# Patient Record
Sex: MALE | Race: WHITE | NOT HISPANIC OR LATINO | Employment: OTHER | URBAN - METROPOLITAN AREA
[De-identification: names, ages, dates, MRNs, and addresses within clinical notes are randomized per-mention and may not be internally consistent; named-entity substitution may affect disease eponyms.]

---

## 2017-12-04 ENCOUNTER — OFFICE VISIT (OUTPATIENT)
Dept: URGENT CARE | Facility: CLINIC | Age: 72
End: 2017-12-04
Payer: MEDICARE

## 2017-12-04 VITALS
BODY MASS INDEX: 30.48 KG/M2 | SYSTOLIC BLOOD PRESSURE: 137 MMHG | DIASTOLIC BLOOD PRESSURE: 91 MMHG | OXYGEN SATURATION: 97 % | TEMPERATURE: 98 F | WEIGHT: 225 LBS | HEART RATE: 68 BPM | HEIGHT: 72 IN | RESPIRATION RATE: 16 BRPM

## 2017-12-04 DIAGNOSIS — J01.90 ACUTE NON-RECURRENT SINUSITIS, UNSPECIFIED LOCATION: Primary | ICD-10-CM

## 2017-12-04 PROCEDURE — 96372 THER/PROPH/DIAG INJ SC/IM: CPT | Mod: S$GLB,,, | Performed by: EMERGENCY MEDICINE

## 2017-12-04 PROCEDURE — 99203 OFFICE O/P NEW LOW 30 MIN: CPT | Mod: 25,S$GLB,, | Performed by: NURSE PRACTITIONER

## 2017-12-04 RX ORDER — AMOXICILLIN 500 MG
2 CAPSULE ORAL DAILY
COMMUNITY
End: 2023-04-19 | Stop reason: CLARIF

## 2017-12-04 RX ORDER — BETAMETHASONE SODIUM PHOSPHATE AND BETAMETHASONE ACETATE 3; 3 MG/ML; MG/ML
9 INJECTION, SUSPENSION INTRA-ARTICULAR; INTRALESIONAL; INTRAMUSCULAR; SOFT TISSUE
Status: COMPLETED | OUTPATIENT
Start: 2017-12-04 | End: 2017-12-04

## 2017-12-04 RX ORDER — FINASTERIDE 1 MG/1
1 TABLET, FILM COATED ORAL DAILY
COMMUNITY
End: 2020-04-30

## 2017-12-04 RX ORDER — AMOXICILLIN AND CLAVULANATE POTASSIUM 875; 125 MG/1; MG/1
1 TABLET, FILM COATED ORAL 2 TIMES DAILY
Qty: 20 TABLET | Refills: 0 | Status: SHIPPED | OUTPATIENT
Start: 2017-12-04 | End: 2017-12-14

## 2017-12-04 RX ORDER — ASPIRIN 81 MG/1
81 TABLET ORAL DAILY
COMMUNITY
End: 2023-04-19 | Stop reason: CLARIF

## 2017-12-04 RX ORDER — FINASTERIDE 5 MG/1
5 TABLET, FILM COATED ORAL DAILY
COMMUNITY
End: 2017-12-04 | Stop reason: CLARIF

## 2017-12-04 RX ORDER — ATORVASTATIN CALCIUM 10 MG/1
10 TABLET, FILM COATED ORAL DAILY
COMMUNITY
End: 2020-04-29 | Stop reason: CLARIF

## 2017-12-04 RX ADMIN — BETAMETHASONE SODIUM PHOSPHATE AND BETAMETHASONE ACETATE 9 MG: 3; 3 INJECTION, SUSPENSION INTRA-ARTICULAR; INTRALESIONAL; INTRAMUSCULAR; SOFT TISSUE at 09:12

## 2017-12-04 NOTE — PROGRESS NOTES
Subjective:       Patient ID: Mao Pearce is a 72 y.o. male.    Vitals:    12/04/17 0915   BP: (!) 137/91   Pulse: 68   Resp: 16   Temp: 97.6 °F (36.4 °C)       Chief Complaint: Sinus Problem    Patient states he has had sinus congestion for 4 days with no improvement from over the counter cold medications.    Pt states his BP is normally 120/80, but he is nervous about being here today, so his BP is elevated.      Sinus Problem   This is a new problem. Episode onset: 4 days. The problem has been gradually worsening since onset. There has been no fever. His pain is at a severity of 6/10. The pain is moderate. Associated symptoms include congestion (nasal), coughing, diaphoresis, headaches, a hoarse voice and sinus pressure. Pertinent negatives include no chills, ear pain, shortness of breath or sore throat. Treatments tried: charleen selzer plus. The treatment provided no relief.     Review of Systems   Constitution: Positive for diaphoresis. Negative for chills, fever and malaise/fatigue.   HENT: Positive for congestion (nasal), hoarse voice and sinus pressure. Negative for ear pain and sore throat.    Eyes: Negative for discharge and redness.   Cardiovascular: Negative for chest pain, dyspnea on exertion and leg swelling.   Respiratory: Positive for cough. Negative for shortness of breath, sputum production and wheezing.    Musculoskeletal: Negative for myalgias.   Gastrointestinal: Negative for abdominal pain and nausea.   Neurological: Positive for headaches.       Objective:      Physical Exam   Constitutional: He is oriented to person, place, and time. He appears well-developed and well-nourished. He is cooperative.  Non-toxic appearance. He does not have a sickly appearance. He does not appear ill. No distress.   HENT:   Head: Normocephalic and atraumatic.   Right Ear: Hearing, tympanic membrane, external ear and ear canal normal.   Left Ear: Hearing, tympanic membrane, external ear and ear canal normal.    Nose: Mucosal edema and rhinorrhea present. Right sinus exhibits maxillary sinus tenderness and frontal sinus tenderness. Left sinus exhibits maxillary sinus tenderness and frontal sinus tenderness.   Mouth/Throat: Uvula is midline and mucous membranes are normal. Posterior oropharyngeal erythema present. No oropharyngeal exudate.   Eyes: Conjunctivae and lids are normal.   Neck: Normal range of motion and full passive range of motion without pain. Neck supple. No neck rigidity. No edema, no erythema and normal range of motion present.   Cardiovascular: Normal rate, regular rhythm and normal heart sounds.    Pulmonary/Chest: Effort normal and breath sounds normal. No accessory muscle usage. No apnea, no tachypnea and no bradypnea. No respiratory distress. He has no decreased breath sounds. He has no wheezes. He has no rhonchi. He has no rales.   Positive for cough   Abdominal: Normal appearance.   Lymphadenopathy:        Head (right side): No submental, no submandibular, no tonsillar, no preauricular, no posterior auricular and no occipital adenopathy present.        Head (left side): No submental, no submandibular, no tonsillar, no preauricular, no posterior auricular and no occipital adenopathy present.     He has no cervical adenopathy.   Neurological: He is alert and oriented to person, place, and time.   Psychiatric: He has a normal mood and affect. His speech is normal and behavior is normal.   Nursing note and vitals reviewed.      Assessment:       1. Acute non-recurrent sinusitis, unspecified location        Plan:       Mao was seen today for sinus problem.    Diagnoses and all orders for this visit:    Acute non-recurrent sinusitis, unspecified location  -     betamethasone acetate-betamethasone sodium phosphate injection 9 mg; Inject 1.5 mLs (9 mg total) into the muscle one time.  -     amoxicillin-clavulanate 875-125mg (AUGMENTIN) 875-125 mg per tablet; Take 1 tablet by mouth 2 (two) times  daily.    Discussed over the counter Mucinex and fluids.  Discussed wait and see antibiotic with pt and to wait 5-7 days to take.  Pt verbalizes understanding.

## 2017-12-04 NOTE — PATIENT INSTRUCTIONS
Please drink plenty of fluids.  Please get plenty of rest.    Please return here or go to the Emergency Department for any concerns or worsening of condition.    If you were prescribed antibiotics, please take them to completion.    If you were given wait & see antibiotics, please wait 5-7 days before taking them, and only take them if your symptoms have worsened or not improved.  If you do begin taking the antibiotics, please take them to completion.    Please take over the counter plain mucinex as directed on bottle. Take with at least 2 glasses of water.    If you do have Hypertension or palpitations, it is safe to take Coricidin HBP for relief of sinus symptoms.    If not allergic, please take over the counter Tylenol (Acetaminophen) and/or Motrin (Ibuprofen) as directed on bottle for control of pain and/or fever.    Please follow up with your primary care doctor or specialist as needed.    If you  smoke, please stop smoking.    Acute Bacterial Rhinosinusitis (ABRS)    Acute bacterial rhinosinusitis (ABRS) is an infection of your nasal cavity and sinuses. Its caused by bacteria. Acute means that youve had symptoms for less than 12 weeks.  Understanding your sinuses  The nasal cavity is the large air-filled space behind your nose. The sinuses are a group of spaces formed by the bones of your face. They connect with your nasal cavity. ABRS causes the tissue lining these spaces to become inflamed. Mucus may not drain normally. This leads to facial pain and other symptoms.  What causes ABRS?  ABRS most often follows an upper respiratory infection caused by a virus. Bacteria then infect the lining of your nasal cavity and sinuses. But you can also get ABRS if you have:  · Nasal allergies  · Long-term nasal swelling and congestion not caused by allergies  · Blockage in the nose  Symptoms of ABRS  The symptoms of ABRS may be different for each person, and can include:  · Nasal congestion  · Runny nose  · Fluid  draining from the nose down the throat (postnasal drip)  · Headache  · Cough  · Pain in the sinuses  · Thick, colored fluid from the nose (mucus)  · Fever  Diagnosing ABRS  ABRS may be diagnosed if youve had an upper respiratory infection like a cold and cough for longer than 10 to 14 days. Your health care provider will ask about your symptoms and your medical history. The provider will check your vital signs, including your temperature. Youll have a physical exam. The health care provider will check your ears, nose, and throat. You likely wont need any tests. If ABRS comes back, you may have a culture or other tests.  Treatment for ABRS  Treatment may include:  · Antibiotic medicine. This is for symptoms that last for at least 10 to 14 days.  · Nasal corticosteroid medicine. Drops or spray used in the nose can lessen swelling and congestion.  · Over-the-counter pain medicine. This is to lessen sinus pain and pressure.  · Nasal decongestant medicine. Spray or drops may help to lessen congestion. Do not use them for more than a few days.  · Salt wash (saline irrigation). This can help to loosen mucus.  Possible complications of ABRS  ABRS may come back or become long-term (chronic).  In rare cases, ABRS may cause complications such as:   · Inflamed tissue around the brain and spinal cord (meningitis)  · Inflamed tissue around the eyes (orbital cellulitis)  · Inflamed bones around the sinuses (osteitis)  These problems may need to be treated in a hospital with intravenous (IV) antibiotic medicine or surgery.  When to call the health care provider  Call your health care provider if you have any of the following:  · Symptoms that dont get better, or get worse  · Symptoms that dont get better after 3 to 5 days on antibiotics  · Trouble seeing  · Swelling around your eyes  · Confusion or trouble staying awake   Date Last Reviewed: 3/3/2015  © 8800-0087 [employer]. [address] [place] [address], [place]  [address]. All rights reserved. This information is not intended as a substitute for professional medical care. Always follow your healthcare professional's instructions.

## 2018-03-05 PROBLEM — J01.90 ACUTE NON-RECURRENT SINUSITIS: Status: RESOLVED | Noted: 2017-12-04 | Resolved: 2018-03-05

## 2019-03-19 ENCOUNTER — OFFICE VISIT (OUTPATIENT)
Dept: URGENT CARE | Facility: CLINIC | Age: 74
End: 2019-03-19
Payer: MEDICARE

## 2019-03-19 VITALS
RESPIRATION RATE: 18 BRPM | DIASTOLIC BLOOD PRESSURE: 81 MMHG | HEART RATE: 69 BPM | HEIGHT: 72 IN | SYSTOLIC BLOOD PRESSURE: 121 MMHG | OXYGEN SATURATION: 98 % | WEIGHT: 225 LBS | BODY MASS INDEX: 30.48 KG/M2 | TEMPERATURE: 97 F

## 2019-03-19 DIAGNOSIS — H61.23 BILATERAL IMPACTED CERUMEN: Primary | ICD-10-CM

## 2019-03-19 PROCEDURE — 99214 PR OFFICE/OUTPT VISIT, EST, LEVL IV, 30-39 MIN: ICD-10-PCS | Mod: 25,S$GLB,, | Performed by: NURSE PRACTITIONER

## 2019-03-19 PROCEDURE — 69209 REMOVE IMPACTED EAR WAX UNI: CPT | Mod: 50,S$GLB,, | Performed by: NURSE PRACTITIONER

## 2019-03-19 PROCEDURE — 69209 EAR CERUMEN REMOVAL: ICD-10-PCS | Mod: 50,S$GLB,, | Performed by: NURSE PRACTITIONER

## 2019-03-19 PROCEDURE — 99214 OFFICE O/P EST MOD 30 MIN: CPT | Mod: 25,S$GLB,, | Performed by: NURSE PRACTITIONER

## 2019-03-20 NOTE — PROGRESS NOTES
"Subjective:       Patient ID: Mao Pearce is a 73 y.o. male.    Vitals:  height is 6' (1.829 m) and weight is 102.1 kg (225 lb). His temperature is 97 °F (36.1 °C). His blood pressure is 121/81 and his pulse is 69. His respiration is 18 and oxygen saturation is 98%.     Chief Complaint: Cerumen Impaction    Patient c/o wax build up in ears, unable to hear.  Patient states he used a qtip and thinks that he pushed it in further.  No pain.  States repeatedly "I'm fine, I just need y'all to look and clean my ears."  Admits to drinking some wine tonight.        Ear Fullness    There is pain in both ears. This is a new problem. The current episode started more than 1 year ago. The problem occurs constantly. The problem has been gradually worsening. There has been no fever. The patient is experiencing no pain. Associated symptoms include hearing loss.       Constitution: Negative for chills, sweating, fatigue and fever.   HENT: Positive for hearing loss. Negative for ear pain, congestion, sinus pain, sinus pressure and voice change.    Neck: Negative for painful lymph nodes.   Eyes: Negative for eye redness.   Respiratory: Negative for chest tightness, sputum production, bloody sputum, COPD, shortness of breath, stridor, wheezing and asthma.    Gastrointestinal: Negative for nausea.   Musculoskeletal: Negative for muscle ache.   Allergic/Immunologic: Negative for seasonal allergies and asthma.   Hematologic/Lymphatic: Negative for swollen lymph nodes.       Objective:      Physical Exam   Constitutional: He is oriented to person, place, and time. He appears well-developed and well-nourished. He is cooperative.  Non-toxic appearance. He does not appear ill. No distress.   HENT:   Head: Normocephalic and atraumatic.   Right Ear: Hearing and external ear normal.   Left Ear: Hearing and external ear normal.   Nose: Nose normal. No mucosal edema, rhinorrhea or nasal deformity. No epistaxis. Right sinus exhibits no " maxillary sinus tenderness and no frontal sinus tenderness. Left sinus exhibits no maxillary sinus tenderness and no frontal sinus tenderness.   Mouth/Throat: Uvula is midline, oropharynx is clear and moist and mucous membranes are normal. No trismus in the jaw. Normal dentition. No uvula swelling. No posterior oropharyngeal erythema.   Bilateral cerumen impaction   Eyes: Conjunctivae and lids are normal. No scleral icterus.   Sclera clear bilat   Neck: Trachea normal, full passive range of motion without pain and phonation normal. Neck supple.   Cardiovascular: Normal rate, regular rhythm, normal heart sounds, intact distal pulses and normal pulses.   Pulmonary/Chest: Effort normal and breath sounds normal. No respiratory distress.   Abdominal: Soft. Normal appearance and bowel sounds are normal. He exhibits no distension. There is no tenderness.   Musculoskeletal: Normal range of motion. He exhibits no edema or deformity.   Neurological: He is alert and oriented to person, place, and time. He exhibits normal muscle tone. Coordination normal.   Skin: Skin is warm, dry and intact. He is not diaphoretic. No pallor.   Psychiatric: He has a normal mood and affect. His speech is normal and behavior is normal. Judgment and thought content normal. Cognition and memory are normal.   Nursing note and vitals reviewed.      Assessment:       1. Bilateral impacted cerumen        Plan:         Bilateral impacted cerumen  -     Ear Cerumen Removal      Patient Instructions     Earwax (Treated)    Everyone produces earwax from the lining of the ear canal. It lubricates and protects the ear. The wax that forms in the canal slowly moves toward the outside of the ear and falls out. Sometimes wax can build up in the ear canal. This can cause a blockage and loss of hearing. A buildup of earwax was removed from your ear today.  Home care  If you have a tendency to build up wax in the ear canal, you should clear the wax at home  regularly, before it causes discomfort. This should be about once every six months.  · Unless a medicine was prescribed, you may use an over-the-counter product made for clearing earwax. These contain carbamide peroxide and are available over-the-counter in a kit with a small bulb syringe.  · Lie down with the blocked ear facing upward. Apply one dropper full of medicine and wait a few minutes. Grasp the outer ear and wiggle it to help the solution enter the canal.  · Lean over a sink or basin with the blocked ear turned downward. Use a rubber bulb syringe filled with warm (not hot or cold) water to rinse the ear several times. Use gentle pressure only. You may need to repeat the irrigation several times before the wax flows out.  · If you are having trouble draining all the water out of your ear canal, put a few drops of rubbing alcohol into the ear canal. This will help remove the remaining water.  Don'ts  · Dont use cold water to rinse the ear. This will make you dizzy.  · Dont do this procedure if you have an ear infection. Symptoms include ear pain, fever, or fluid draining from the ear.  · Dont do this procedure if you have a punctured eardrum.  · Dont use cotton swabs, matches, hairpins, keys, or other objects to clean the ear canal. This can cause infection of the ear canal or rupture of the eardrum. Because of their size and shape, cotton swabs can push the earwax deeper into the ear canal instead of removing it.  Follow-up care  Follow up with your healthcare provider, or as advised.  When to seek medical advice  Call your healthcare provider right away if any of these occur:  · Worsening ear pain  · Fever of 100.4°F (38°C) or higher, or as directed by your healthcare provider  · Hearing does not return to normal after three days of treatment  · Fluid drainage or bleeding from the ear canal  · Swelling, redness, or tenderness of the outer ear  · Headache, neck pain, or stiff neck  Date Last Reviewed:  3/22/2015  © 2964-8136 [employer], [employer] [address] [place] [address], [place] [address]. All rights reserved. This information is not intended as a substitute for professional medical care. Always follow your healthcare professional's instructions.

## 2019-03-20 NOTE — PATIENT INSTRUCTIONS
Earwax (Treated)    Everyone produces earwax from the lining of the ear canal. It lubricates and protects the ear. The wax that forms in the canal slowly moves toward the outside of the ear and falls out. Sometimes wax can build up in the ear canal. This can cause a blockage and loss of hearing. A buildup of earwax was removed from your ear today.  Home care  If you have a tendency to build up wax in the ear canal, you should clear the wax at home regularly, before it causes discomfort. This should be about once every six months.  · Unless a medicine was prescribed, you may use an over-the-counter product made for clearing earwax. These contain carbamide peroxide and are available over-the-counter in a kit with a small bulb syringe.  · Lie down with the blocked ear facing upward. Apply one dropper full of medicine and wait a few minutes. Grasp the outer ear and wiggle it to help the solution enter the canal.  · Lean over a sink or basin with the blocked ear turned downward. Use a rubber bulb syringe filled with warm (not hot or cold) water to rinse the ear several times. Use gentle pressure only. You may need to repeat the irrigation several times before the wax flows out.  · If you are having trouble draining all the water out of your ear canal, put a few drops of rubbing alcohol into the ear canal. This will help remove the remaining water.  Don'ts  · Dont use cold water to rinse the ear. This will make you dizzy.  · Dont do this procedure if you have an ear infection. Symptoms include ear pain, fever, or fluid draining from the ear.  · Dont do this procedure if you have a punctured eardrum.  · Dont use cotton swabs, matches, hairpins, keys, or other objects to clean the ear canal. This can cause infection of the ear canal or rupture of the eardrum. Because of their size and shape, cotton swabs can push the earwax deeper into the ear canal instead of removing it.  Follow-up care  Follow up with your  healthcare provider, or as advised.  When to seek medical advice  Call your healthcare provider right away if any of these occur:  · Worsening ear pain  · Fever of 100.4°F (38°C) or higher, or as directed by your healthcare provider  · Hearing does not return to normal after three days of treatment  · Fluid drainage or bleeding from the ear canal  · Swelling, redness, or tenderness of the outer ear  · Headache, neck pain, or stiff neck  Date Last Reviewed: 3/22/2015  © 4555-1116 [employer]. [address] [place] [address]. All rights reserved. This information is not intended as a substitute for professional medical care. Always follow your healthcare professional's instructions.

## 2019-03-20 NOTE — PROCEDURES
Ear Cerumen Removal  Date/Time: 3/19/2019 8:08 PM  Performed by: Ml Martinez MA  Authorized by: Anaya Mendez NP     Consent Done?:  Yes (Verbal)    Local anesthetic:  None  Location details:  Both ears  Procedure type: irrigation    Cerumen  Removal Results:  Cerumen completely removed  Patient tolerance:  Patient tolerated the procedure well with no immediate complications     Hearing restored.  Bilateral TM and canals normal.

## 2019-06-19 ENCOUNTER — OFFICE VISIT (OUTPATIENT)
Dept: URGENT CARE | Facility: CLINIC | Age: 74
End: 2019-06-19
Payer: MEDICARE

## 2019-06-19 VITALS
WEIGHT: 225 LBS | DIASTOLIC BLOOD PRESSURE: 82 MMHG | OXYGEN SATURATION: 99 % | TEMPERATURE: 97 F | BODY MASS INDEX: 30.48 KG/M2 | RESPIRATION RATE: 16 BRPM | HEART RATE: 64 BPM | SYSTOLIC BLOOD PRESSURE: 133 MMHG | HEIGHT: 72 IN

## 2019-06-19 DIAGNOSIS — H61.23 EXCESSIVE CERUMEN IN BOTH EAR CANALS: Primary | ICD-10-CM

## 2019-06-19 PROCEDURE — 69209 EAR CERUMEN REMOVAL: ICD-10-PCS | Mod: 50,S$GLB,, | Performed by: NURSE PRACTITIONER

## 2019-06-19 PROCEDURE — 99214 OFFICE O/P EST MOD 30 MIN: CPT | Mod: S$GLB,,, | Performed by: NURSE PRACTITIONER

## 2019-06-19 PROCEDURE — 99214 PR OFFICE/OUTPT VISIT, EST, LEVL IV, 30-39 MIN: ICD-10-PCS | Mod: S$GLB,,, | Performed by: NURSE PRACTITIONER

## 2019-06-19 PROCEDURE — 69209 REMOVE IMPACTED EAR WAX UNI: CPT | Mod: 50,S$GLB,, | Performed by: NURSE PRACTITIONER

## 2019-06-19 RX ORDER — ALPRAZOLAM 1 MG/1
TABLET ORAL
Refills: 1 | COMMUNITY
Start: 2019-04-16

## 2019-06-19 RX ORDER — ATORVASTATIN CALCIUM 40 MG/1
TABLET, FILM COATED ORAL
Refills: 3 | COMMUNITY
Start: 2019-06-10 | End: 2020-04-29 | Stop reason: CLARIF

## 2019-06-19 RX ORDER — FINASTERIDE 5 MG/1
TABLET, FILM COATED ORAL
Refills: 3 | COMMUNITY
Start: 2019-03-20

## 2019-06-19 NOTE — PROCEDURES
Ear Cerumen Removal  Date/Time: 6/19/2019 5:33 PM  Performed by: Marcy Lion MA  Authorized by: Anaya Mendez NP     Consent Done?:  Yes (Verbal)    Local anesthetic:  None  Medication Used:  Debrox  Location details:  Both ears  Procedure type: irrigation    Cerumen  Removal Results:  Cerumen completely removed  Patient tolerance:  Patient tolerated the procedure well with no immediate complications     Hearing returned.  Tolerated well.

## 2019-06-19 NOTE — PROGRESS NOTES
Subjective:       Patient ID: Mao Pearce is a 73 y.o. male.    Vitals:    06/19/19 1654   BP: 133/82   Pulse: 64   Resp: 16   Temp: 97.1 °F (36.2 °C)   TempSrc: Oral   SpO2: 99%   Weight: 102.1 kg (225 lb)   Height: 6' (1.829 m)       Chief Complaint: Cerumen Impaction    Pt states today onset with possible impacted wax again.  Decreased hearing today during lunch.  Denies URI symptoms.    Other   This is a new problem. The current episode started today. The problem has been unchanged. Pertinent negatives include no abdominal pain, chest pain, chills, congestion, coughing, fever, headaches, nausea, rash, sore throat or vomiting. Nothing aggravates the symptoms. He has tried nothing for the symptoms.     Review of Systems   Constitution: Negative for chills and fever.   HENT: Negative for congestion and sore throat.    Eyes: Negative for blurred vision.   Cardiovascular: Negative for chest pain.   Respiratory: Negative for cough and shortness of breath.    Skin: Negative for rash.   Musculoskeletal: Negative for back pain and joint pain.   Gastrointestinal: Negative for abdominal pain, diarrhea, nausea and vomiting.   Neurological: Negative for headaches.   Psychiatric/Behavioral: The patient is not nervous/anxious.        Objective:      Physical Exam   Constitutional: He is oriented to person, place, and time. He appears well-developed and well-nourished. He is cooperative.  Non-toxic appearance. He does not have a sickly appearance. He does not appear ill. No distress.   HENT:   Head: Normocephalic and atraumatic.   Right Ear: Hearing, tympanic membrane, external ear and ear canal normal.   Left Ear: Hearing, tympanic membrane, external ear and ear canal normal.   Nose: Nose normal. No mucosal edema, rhinorrhea or nasal deformity. No epistaxis. Right sinus exhibits no maxillary sinus tenderness and no frontal sinus tenderness. Left sinus exhibits no maxillary sinus tenderness and no frontal sinus  tenderness.   Mouth/Throat: Uvula is midline, oropharynx is clear and moist and mucous membranes are normal. No trismus in the jaw. Normal dentition. No uvula swelling. No posterior oropharyngeal erythema.   Excessive cerumen to bilateral ears   Eyes: Conjunctivae and lids are normal. No scleral icterus.   Sclera clear bilat   Neck: Trachea normal, full passive range of motion without pain and phonation normal. Neck supple.   Cardiovascular: Normal rate, regular rhythm, normal heart sounds, intact distal pulses and normal pulses.   Pulmonary/Chest: Effort normal and breath sounds normal. No respiratory distress.   Abdominal: Soft. Normal appearance and bowel sounds are normal. He exhibits no distension. There is no tenderness.   Musculoskeletal: Normal range of motion. He exhibits no edema or deformity.   Neurological: He is alert and oriented to person, place, and time. He exhibits normal muscle tone. Coordination normal.   Skin: Skin is warm, dry and intact. He is not diaphoretic. No pallor.   Psychiatric: He has a normal mood and affect. His speech is normal and behavior is normal. Judgment and thought content normal. Cognition and memory are normal.   Nursing note and vitals reviewed.      Assessment:       1. Excessive cerumen in both ear canals        Plan:       Mao was seen today for cerumen impaction.    Diagnoses and all orders for this visit:    Excessive cerumen in both ear canals  -     Ear Cerumen Removal      Patient Instructions     Earwax (Treated)    Everyone produces earwax from the lining of the ear canal. It lubricates and protects the ear. The wax that forms in the canal slowly moves toward the outside of the ear and falls out. Sometimes wax can build up in the ear canal. This can cause a blockage and loss of hearing. A buildup of earwax was removed from your ear today.  Home care  If you have a tendency to build up wax in the ear canal, you should clear the wax at home regularly, before it  causes discomfort. This should be about once every six months.  · Unless a medicine was prescribed, you may use an over-the-counter product made for clearing earwax. These contain carbamide peroxide and are available over-the-counter in a kit with a small bulb syringe.  · Lie down with the blocked ear facing upward. Apply one dropper full of medicine and wait a few minutes. Grasp the outer ear and wiggle it to help the solution enter the canal.  · Lean over a sink or basin with the blocked ear turned downward. Use a rubber bulb syringe filled with warm (not hot or cold) water to rinse the ear several times. Use gentle pressure only. You may need to repeat the irrigation several times before the wax flows out.  · If you are having trouble draining all the water out of your ear canal, put a few drops of rubbing alcohol into the ear canal. This will help remove the remaining water.  Don'ts  · Dont use cold water to rinse the ear. This will make you dizzy.  · Dont do this procedure if you have an ear infection. Symptoms include ear pain, fever, or fluid draining from the ear.  · Dont do this procedure if you have a punctured eardrum.  · Dont use cotton swabs, matches, hairpins, keys, or other objects to clean the ear canal. This can cause infection of the ear canal or rupture of the eardrum. Because of their size and shape, cotton swabs can push the earwax deeper into the ear canal instead of removing it.  Follow-up care  Follow up with your healthcare provider, or as advised.  When to seek medical advice  Call your healthcare provider right away if any of these occur:  · Worsening ear pain  · Fever of 100.4°F (38°C) or higher, or as directed by your healthcare provider  · Hearing does not return to normal after three days of treatment  · Fluid drainage or bleeding from the ear canal  · Swelling, redness, or tenderness of the outer ear  · Headache, neck pain, or stiff neck  Date Last Reviewed: 3/22/2015  ©  7899-4558 [employer]. [address] [place] [address], [place] [address]. All rights reserved. This information is not intended as a substitute for professional medical care. Always follow your healthcare professional's instructions.

## 2019-06-19 NOTE — PATIENT INSTRUCTIONS
Earwax (Treated)    Everyone produces earwax from the lining of the ear canal. It lubricates and protects the ear. The wax that forms in the canal slowly moves toward the outside of the ear and falls out. Sometimes wax can build up in the ear canal. This can cause a blockage and loss of hearing. A buildup of earwax was removed from your ear today.  Home care  If you have a tendency to build up wax in the ear canal, you should clear the wax at home regularly, before it causes discomfort. This should be about once every six months.  · Unless a medicine was prescribed, you may use an over-the-counter product made for clearing earwax. These contain carbamide peroxide and are available over-the-counter in a kit with a small bulb syringe.  · Lie down with the blocked ear facing upward. Apply one dropper full of medicine and wait a few minutes. Grasp the outer ear and wiggle it to help the solution enter the canal.  · Lean over a sink or basin with the blocked ear turned downward. Use a rubber bulb syringe filled with warm (not hot or cold) water to rinse the ear several times. Use gentle pressure only. You may need to repeat the irrigation several times before the wax flows out.  · If you are having trouble draining all the water out of your ear canal, put a few drops of rubbing alcohol into the ear canal. This will help remove the remaining water.  Don'ts  · Dont use cold water to rinse the ear. This will make you dizzy.  · Dont do this procedure if you have an ear infection. Symptoms include ear pain, fever, or fluid draining from the ear.  · Dont do this procedure if you have a punctured eardrum.  · Dont use cotton swabs, matches, hairpins, keys, or other objects to clean the ear canal. This can cause infection of the ear canal or rupture of the eardrum. Because of their size and shape, cotton swabs can push the earwax deeper into the ear canal instead of removing it.  Follow-up care  Follow up with your  healthcare provider, or as advised.  When to seek medical advice  Call your healthcare provider right away if any of these occur:  · Worsening ear pain  · Fever of 100.4°F (38°C) or higher, or as directed by your healthcare provider  · Hearing does not return to normal after three days of treatment  · Fluid drainage or bleeding from the ear canal  · Swelling, redness, or tenderness of the outer ear  · Headache, neck pain, or stiff neck  Date Last Reviewed: 3/22/2015  © 6746-1518 [employer]. [address] [place] [address]. All rights reserved. This information is not intended as a substitute for professional medical care. Always follow your healthcare professional's instructions.

## 2019-06-25 ENCOUNTER — TELEPHONE (OUTPATIENT)
Dept: URGENT CARE | Facility: CLINIC | Age: 74
End: 2019-06-25

## 2020-01-18 ENCOUNTER — OFFICE VISIT (OUTPATIENT)
Dept: URGENT CARE | Facility: CLINIC | Age: 75
End: 2020-01-18
Payer: MEDICARE

## 2020-01-18 VITALS
HEIGHT: 72 IN | SYSTOLIC BLOOD PRESSURE: 150 MMHG | RESPIRATION RATE: 16 BRPM | HEART RATE: 69 BPM | DIASTOLIC BLOOD PRESSURE: 88 MMHG | BODY MASS INDEX: 30.48 KG/M2 | TEMPERATURE: 97 F | WEIGHT: 225 LBS | OXYGEN SATURATION: 97 %

## 2020-01-18 DIAGNOSIS — J01.40 ACUTE PANSINUSITIS, RECURRENCE NOT SPECIFIED: Primary | ICD-10-CM

## 2020-01-18 PROCEDURE — 99214 PR OFFICE/OUTPT VISIT, EST, LEVL IV, 30-39 MIN: ICD-10-PCS | Mod: 25,S$GLB,, | Performed by: EMERGENCY MEDICINE

## 2020-01-18 PROCEDURE — 99214 OFFICE O/P EST MOD 30 MIN: CPT | Mod: 25,S$GLB,, | Performed by: EMERGENCY MEDICINE

## 2020-01-18 PROCEDURE — 96372 THER/PROPH/DIAG INJ SC/IM: CPT | Mod: S$GLB,,, | Performed by: EMERGENCY MEDICINE

## 2020-01-18 PROCEDURE — 96372 PR INJECTION,THERAP/PROPH/DIAG2ST, IM OR SUBCUT: ICD-10-PCS | Mod: S$GLB,,, | Performed by: EMERGENCY MEDICINE

## 2020-01-18 RX ORDER — BETAMETHASONE SODIUM PHOSPHATE AND BETAMETHASONE ACETATE 3; 3 MG/ML; MG/ML
9 INJECTION, SUSPENSION INTRA-ARTICULAR; INTRALESIONAL; INTRAMUSCULAR; SOFT TISSUE
Status: COMPLETED | OUTPATIENT
Start: 2020-01-18 | End: 2020-01-18

## 2020-01-18 RX ADMIN — BETAMETHASONE SODIUM PHOSPHATE AND BETAMETHASONE ACETATE 9 MG: 3; 3 INJECTION, SUSPENSION INTRA-ARTICULAR; INTRALESIONAL; INTRAMUSCULAR; SOFT TISSUE at 02:01

## 2020-01-18 NOTE — PROGRESS NOTES
Subjective:       Patient ID: Mao Pearce is a 74 y.o. male.    Vitals:  height is 6' (1.829 m) and weight is 102.1 kg (225 lb). His temperature is 97.2 °F (36.2 °C). His blood pressure is 150/88 (abnormal) and his pulse is 69. His respiration is 16 and oxygen saturation is 97%.     Chief Complaint: Sore Throat    Pt. States symptoms started on Thursday. Sore throat, sinus problem, coughing with sputum production. Pt. Also asked about cleaning out his earwax.     Sore Throat    This is a new problem. The current episode started in the past 7 days. The problem has been gradually worsening. Neither side of throat is experiencing more pain than the other. There has been no fever. The fever has been present for less than 1 day. The pain is at a severity of 6/10. The pain is moderate. Associated symptoms include congestion, coughing, ear pain, a hoarse voice and a plugged ear sensation. Pertinent negatives include no shortness of breath, stridor or vomiting. He has had no exposure to strep. Treatments tried: Dayquil and Nyquil. The treatment provided mild relief.       Constitution: Negative for chills, sweating, fatigue and fever.   HENT: Positive for ear pain, congestion, sore throat and voice change. Negative for sinus pain and sinus pressure.    Neck: Negative for painful lymph nodes.   Eyes: Negative for eye redness.   Respiratory: Positive for cough and sputum production. Negative for chest tightness, bloody sputum, COPD, shortness of breath, stridor, wheezing and asthma.    Gastrointestinal: Negative for nausea and vomiting.   Musculoskeletal: Negative for muscle ache.   Skin: Negative for rash.   Allergic/Immunologic: Negative for seasonal allergies and asthma.   Hematologic/Lymphatic: Negative for swollen lymph nodes.       Objective:      Physical Exam   Constitutional: He is oriented to person, place, and time. He appears well-developed and well-nourished. He is cooperative.  Non-toxic appearance. He  does not have a sickly appearance. He does not appear ill. No distress.   HENT:   Head: Normocephalic and atraumatic.   Right Ear: Hearing, tympanic membrane, external ear and ear canal normal.   Left Ear: Hearing, tympanic membrane, external ear and ear canal normal.   Nose: Mucosal edema and rhinorrhea present. No nasal deformity. No epistaxis. Right sinus exhibits maxillary sinus tenderness and frontal sinus tenderness. Left sinus exhibits maxillary sinus tenderness and frontal sinus tenderness.   Mouth/Throat: Uvula is midline, oropharynx is clear and moist and mucous membranes are normal. No trismus in the jaw. Normal dentition. No uvula swelling. No oropharyngeal exudate, posterior oropharyngeal edema or posterior oropharyngeal erythema.   Eyes: Conjunctivae and lids are normal. No scleral icterus.   Neck: Trachea normal, full passive range of motion without pain and phonation normal. Neck supple. No neck rigidity. No edema and no erythema present.   Cardiovascular: Normal rate, regular rhythm, normal heart sounds, intact distal pulses and normal pulses.   Pulmonary/Chest: Effort normal and breath sounds normal. No respiratory distress. He has no decreased breath sounds. He has no rhonchi.   Abdominal: Normal appearance.   Musculoskeletal: Normal range of motion. He exhibits no edema or deformity.   Neurological: He is alert and oriented to person, place, and time. He exhibits normal muscle tone. Coordination normal.   Skin: Skin is warm, dry, intact, not diaphoretic and not pale.   Psychiatric: He has a normal mood and affect. His speech is normal and behavior is normal. Judgment and thought content normal. Cognition and memory are normal.   Nursing note and vitals reviewed.        Assessment:       1. Acute pansinusitis, recurrence not specified        Plan:         Acute pansinusitis, recurrence not specified    Other orders  -     betamethasone acetate-betamethasone sodium phosphate injection 9  mg      Patient Instructions   Please return here or go to the Emergency Department for any concerns or worsening of condition      Start taking antibiotics if not improved in 3 days    Zyrtec, Claritin, or Allegra OTC as directed for the next 7 days    Flonase OTC as directed for the next 7 days    Salt Water Nasal Spray OTC 3x/day for the next 7 days    Tylenol 500mg 2 tabs by mouth every 8 hours  Motrin 200mg 2 tabs by mouth every 8 hours  Alternate Tylenol and Motrin every 4hours    Mucinex or Robitussin OTC as directed for cough    Sudafed as directed for runny nose and congestion        Follow up with Primary Care or ENT if not improved in 7-10 Days:  [phone]      Sinusitis (No Antibiotics)    The sinuses are air-filled spaces within the bones of the face. They connect to the inside of the nose. Sinusitis is an inflammation of the tissue lining the sinus cavity. Sinus inflammation can occur during a cold. It can also be due to allergies to pollens and other particles in the air. It can cause symptoms such as sinus congestion, headache, sore throat, facial swelling and fullness. It may also cause a low-grade fever. No infection is present, and no antibiotic treatment is needed.  Home care  · Drink plenty of water, hot tea, and other liquids. This may help thin mucus. It also may promote sinus drainage.  · Heat may help soothe painful areas of the face. Use a towel soaked in hot water. Or,  the shower and direct the hot spray onto your face. Using a vaporizer along with a menthol rub at night may also help.   · An expectorant containing guaifenesin may help thin the mucus and promote drainage from the sinuses.  · Over-the-counter decongestants may be used unless a similar medicine was prescribed. Nasal sprays work the fastest. Use one that contains phenylephrine or oxymetazoline. First blow the nose gently. Then use the spray. Do not use these medicines more often than directed on the label or symptoms  may get worse. You may also use tablets containing pseudoephedrine. Avoid products that combine ingredients, because side effects may be increased. Read labels. You can also ask the pharmacist for help. (NOTE: Persons with high blood pressure should not use decongestants. They can raise blood pressure.)  · Over-the-counter antihistamines may help if allergies contributed to your sinusitis.    · Use acetaminophen or ibuprofen to control pain, unless another pain medicine was prescribed. (If you have chronic liver or kidney disease or ever had a stomach ulcer, talk with your doctor before using these medicines. Aspirin should never be used in anyone under 18 years of age who is ill with a fever. It may cause severe liver damage.)  · Use nasal rinses or irrigation as instructed by your health care provider.  · Don't smoke. This can worsen symptoms.  Follow-up care  Follow up with your healthcare provider or our staff if you are not improving within the next week.  When to seek medical advice  Call your healthcare provider if any of these occur:  · Green or yellow discharge from the nose or into the throat  · Facial pain or headache becoming more severe  · Stiff neck  · Unusual drowsiness or confusion  · Swelling of the forehead or eyelids  · Vision problems, including blurred or double vision  · Fever of 100.4ºF (38ºC) or higher, or as directed by your healthcare provider  · Seizure  · Breathing problems  · Symptoms not resolving within 10 days  Date Last Reviewed: 4/13/2015  © 8438-9234 [employer]. [address] [place] [address], [place] [address]. All rights reserved. This information is not intended as a substitute for professional medical care. Always follow your healthcare professional's instructions.      When to Use Antibiotics   Antibiotics are medicines used to treat infections caused by bacteria. They dont work for illnesses caused by viruses or an allergic reaction. In fact, taking antibiotics for  reasons other than a bacterial infection can cause problems. For example, you may have side effects from the medicine. And if you really need an antibiotic, it may not work well.                                                                                                                                              When antibiotics wont help  Your healthcare provider wont usually prescribe antibiotics for the following conditions. You can help by not asking for them if you have:   · A cold. This type of illness is caused by a virus. It can cause a runny nose, stuffed-up nose, sneezing, coughing, headache, mild body aches, and low fever. A cold gets better on its own in a few days to a week.  · The flu (influenza). This is a respiratory illness caused by a virus. The flu usually goes away on its own in a week or so. It can cause fever, body aches, sore throat, and fatigue.  · Bronchitis. This is an infection in the lungs most often caused by a virus. You may have coughing, phlegm, body aches, and a low fever. A common type of bronchitis is known as a chest cold (acute bronchitis). This often happens after you have a respiratory infection like a common cold. Bronchitis can take weeks to go away, but antibiotics usually dont help.  · Most sore throats. Sore throats are most often caused by viruses. Your throat may feel scratchy or achy, and it may hurt to swallow. You may also have a low fever and body aches. A sore throat usually gets better in a few days.  · Most ear infections. An ear infection may be caused by a virus or bacteria. It causes pain in the ear. Antibiotics usually dont help, and the infection goes away on its own.  · Most sinus infections (sinusitis). This kind of infection causes sinus pain and swelling, and a runny nose. In most cases, sinusitis goes away on its own, and antibiotics dont make recovery quicker.  · Allergic rhinitis. This is a set of symptoms caused by an allergic reaction. You  may have sneezing, a runny nose, itchy or watery eyes, or a sore throat. Allergies are not treated with antibiotics.  · Low fever. A mild fever thats less than 100.4°F (38°C) most likely doesnt need treatment with antibiotics.   When antibiotics can help   Antibiotics can be used to treat:                                                     · Strep throat. This is a throat infectioncaused by a certain type of bacteria. Symptoms of strep throat include a sore throat, white patches on the tonsils, red spots on the roof of the mouth, fever, body aches, and nausea and vomiting.  · Urinary tract infection (UTI). This is a bacterial infection of the bladder and the tube that takes urine out of the body. It can cause burning pain and urine thats cloudy or tinted with blood. UTIs are very common. Antibiotics usually help treat these infections.  · Some ear infections. In some cases, a healthcare provider may prescribe antibiotics for an ear infection. You may need a test to show whats causing the ear infection.  · Some sinus infections. In some cases, yourhealthcare provider may give you antibiotics. He or she may first need to make sure your symptoms arent caused by a virus, fungus, allergies, or air pollutants such as smoke.   Your doctor may also recommend antibiotics if you have a condition that can affect your immune system, such as diabetes or cancer.   Self-care at home   If your infection cant be treated with antibiotics, you can take other steps to feel better. Try the remedies below. In general:   · Rest and sleep as much as needed.  · Drink water and other clear fluids.  · Dont smoke, and avoid smoke from other people.  · Use over-the-counter medicine such as acetaminophen to ease pain or fever, as directed by your healthcare provider.   To treat sinus pain or nasal congestion:   · Put a warm, moist washcloth on your face where you feel sinus pain or pressure.  · Use a nasal spray with medicine or saline,  "as directed by your healthcare provider.  · Breathe in steam from a hot shower.  · Use a humidifier or cool mist vaporizer.   To quiet a cough:   · Use a humidifier or cool mist vaporizer.  · Breathe in steam from a hot shower.  · Use cough lozenges.   To sooth a sore throat:   · Suck on ice chips, popsicles, or lozenges.  · Use a sore throat spray.  · Use a humidifier or cool mist vaporizer.  · Gargle with saltwater.  · Drink warm liquids.   To ease ear pain:   · Hold a warm, moist washcloth on the ear for 10 minutes at a time.  Date Last Reviewed: 9/1/2016 © 2000-2016 [id number]. [address] [place] [address], [place] [address]. All rights reserved. This information is not intended as a substitute for professional medical care. Always follow your healthcare professional's instructions.           Understanding Nasal Anatomy: Inside View  A lot happens under the surface of the nose. The bone and cartilage under the skin give the nose most of its size and shape. Other structures inside and behind the nose help you breathe. Learning the anatomy of the nose can help you better understand how the nose works.       Bone supports the upper third (bridge) of the nose. The upper cartilage supports the side of the nose. The lower cartilage adds support, width, and height. It helps shape the nostrils and the tip of the nose. Skin also helps shape the nose.          The nasal cavity is a hollow space behind the nose that air flows through. The septum is a thin "wall" made of cartilage and bone. It divides the inside of the nose into two chambers. The mucous membrane is thin tissue that lines the nose, sinuses, and throat. It warms and moistens the air you breathe in. It also makes the sticky mucus that helps clean the air of dust and other small particles. The turbinates on each side of the nose are curved, bony ridges lined with mucous membrane. They warm and moisten the air you breathe in. The sinuses are hollow, " air-filled chambers in the bone around your nose. Mucus from the sinuses drains into the nasal cavity.  Date Last Reviewed: 11/1/2016  © 8282-5140 [employer] [address] [place] [address], [place] [address]. All rights reserved. This information is not intended as a substitute for professional medical care. Always follow your healthcare professional's instructions.

## 2020-01-18 NOTE — PATIENT INSTRUCTIONS
Please return here or go to the Emergency Department for any concerns or worsening of condition      Start taking antibiotics if not improved in 3 days    Zyrtec, Claritin, or Allegra OTC as directed for the next 7 days    Flonase OTC as directed for the next 7 days    Salt Water Nasal Spray OTC 3x/day for the next 7 days    Tylenol 500mg 2 tabs by mouth every 8 hours  Motrin 200mg 2 tabs by mouth every 8 hours  Alternate Tylenol and Motrin every 4hours    Mucinex or Robitussin OTC as directed for cough    Sudafed as directed for runny nose and congestion        Follow up with Primary Care or ENT if not improved in 7-10 Days:  [phone]      Sinusitis (No Antibiotics)    The sinuses are air-filled spaces within the bones of the face. They connect to the inside of the nose. Sinusitis is an inflammation of the tissue lining the sinus cavity. Sinus inflammation can occur during a cold. It can also be due to allergies to pollens and other particles in the air. It can cause symptoms such as sinus congestion, headache, sore throat, facial swelling and fullness. It may also cause a low-grade fever. No infection is present, and no antibiotic treatment is needed.  Home care  · Drink plenty of water, hot tea, and other liquids. This may help thin mucus. It also may promote sinus drainage.  · Heat may help soothe painful areas of the face. Use a towel soaked in hot water. Or,  the shower and direct the hot spray onto your face. Using a vaporizer along with a menthol rub at night may also help.   · An expectorant containing guaifenesin may help thin the mucus and promote drainage from the sinuses.  · Over-the-counter decongestants may be used unless a similar medicine was prescribed. Nasal sprays work the fastest. Use one that contains phenylephrine or oxymetazoline. First blow the nose gently. Then use the spray. Do not use these medicines more often than directed on the label or symptoms may get worse. You may also use  tablets containing pseudoephedrine. Avoid products that combine ingredients, because side effects may be increased. Read labels. You can also ask the pharmacist for help. (NOTE: Persons with high blood pressure should not use decongestants. They can raise blood pressure.)  · Over-the-counter antihistamines may help if allergies contributed to your sinusitis.    · Use acetaminophen or ibuprofen to control pain, unless another pain medicine was prescribed. (If you have chronic liver or kidney disease or ever had a stomach ulcer, talk with your doctor before using these medicines. Aspirin should never be used in anyone under 18 years of age who is ill with a fever. It may cause severe liver damage.)  · Use nasal rinses or irrigation as instructed by your health care provider.  · Don't smoke. This can worsen symptoms.  Follow-up care  Follow up with your healthcare provider or our staff if you are not improving within the next week.  When to seek medical advice  Call your healthcare provider if any of these occur:  · Green or yellow discharge from the nose or into the throat  · Facial pain or headache becoming more severe  · Stiff neck  · Unusual drowsiness or confusion  · Swelling of the forehead or eyelids  · Vision problems, including blurred or double vision  · Fever of 100.4ºF (38ºC) or higher, or as directed by your healthcare provider  · Seizure  · Breathing problems  · Symptoms not resolving within 10 days  Date Last Reviewed: 4/13/2015  © 7922-7359 [employer]. [address] [place] [address]. All rights reserved. This information is not intended as a substitute for professional medical care. Always follow your healthcare professional's instructions.      When to Use Antibiotics   Antibiotics are medicines used to treat infections caused by bacteria. They dont work for illnesses caused by viruses or an allergic reaction. In fact, taking antibiotics for reasons other than a bacterial  infection can cause problems. For example, you may have side effects from the medicine. And if you really need an antibiotic, it may not work well.                                                                                                                                              When antibiotics wont help  Your healthcare provider wont usually prescribe antibiotics for the following conditions. You can help by not asking for them if you have:   · A cold. This type of illness is caused by a virus. It can cause a runny nose, stuffed-up nose, sneezing, coughing, headache, mild body aches, and low fever. A cold gets better on its own in a few days to a week.  · The flu (influenza). This is a respiratory illness caused by a virus. The flu usually goes away on its own in a week or so. It can cause fever, body aches, sore throat, and fatigue.  · Bronchitis. This is an infection in the lungs most often caused by a virus. You may have coughing, phlegm, body aches, and a low fever. A common type of bronchitis is known as a chest cold (acute bronchitis). This often happens after you have a respiratory infection like a common cold. Bronchitis can take weeks to go away, but antibiotics usually dont help.  · Most sore throats. Sore throats are most often caused by viruses. Your throat may feel scratchy or achy, and it may hurt to swallow. You may also have a low fever and body aches. A sore throat usually gets better in a few days.  · Most ear infections. An ear infection may be caused by a virus or bacteria. It causes pain in the ear. Antibiotics usually dont help, and the infection goes away on its own.  · Most sinus infections (sinusitis). This kind of infection causes sinus pain and swelling, and a runny nose. In most cases, sinusitis goes away on its own, and antibiotics dont make recovery quicker.  · Allergic rhinitis. This is a set of symptoms caused by an allergic reaction. You may have sneezing, a runny  nose, itchy or watery eyes, or a sore throat. Allergies are not treated with antibiotics.  · Low fever. A mild fever thats less than 100.4°F (38°C) most likely doesnt need treatment with antibiotics.   When antibiotics can help   Antibiotics can be used to treat:                                                     · Strep throat. This is a throat infectioncaused by a certain type of bacteria. Symptoms of strep throat include a sore throat, white patches on the tonsils, red spots on the roof of the mouth, fever, body aches, and nausea and vomiting.  · Urinary tract infection (UTI). This is a bacterial infection of the bladder and the tube that takes urine out of the body. It can cause burning pain and urine thats cloudy or tinted with blood. UTIs are very common. Antibiotics usually help treat these infections.  · Some ear infections. In some cases, a healthcare provider may prescribe antibiotics for an ear infection. You may need a test to show whats causing the ear infection.  · Some sinus infections. In some cases, yourhealthcare provider may give you antibiotics. He or she may first need to make sure your symptoms arent caused by a virus, fungus, allergies, or air pollutants such as smoke.   Your doctor may also recommend antibiotics if you have a condition that can affect your immune system, such as diabetes or cancer.   Self-care at home   If your infection cant be treated with antibiotics, you can take other steps to feel better. Try the remedies below. In general:   · Rest and sleep as much as needed.  · Drink water and other clear fluids.  · Dont smoke, and avoid smoke from other people.  · Use over-the-counter medicine such as acetaminophen to ease pain or fever, as directed by your healthcare provider.   To treat sinus pain or nasal congestion:   · Put a warm, moist washcloth on your face where you feel sinus pain or pressure.  · Use a nasal spray with medicine or saline, as directed by your  "healthcare provider.  · Breathe in steam from a hot shower.  · Use a humidifier or cool mist vaporizer.   To quiet a cough:   · Use a humidifier or cool mist vaporizer.  · Breathe in steam from a hot shower.  · Use cough lozenges.   To sooth a sore throat:   · Suck on ice chips, popsicles, or lozenges.  · Use a sore throat spray.  · Use a humidifier or cool mist vaporizer.  · Gargle with saltwater.  · Drink warm liquids.   To ease ear pain:   · Hold a warm, moist washcloth on the ear for 10 minutes at a time.  Date Last Reviewed: 9/1/2016 © 2000-2016 [employer] [address] [place] [address], [place] [address]. All rights reserved. This information is not intended as a substitute for professional medical care. Always follow your healthcare professional's instructions.           Understanding Nasal Anatomy: Inside View  A lot happens under the surface of the nose. The bone and cartilage under the skin give the nose most of its size and shape. Other structures inside and behind the nose help you breathe. Learning the anatomy of the nose can help you better understand how the nose works.       Bone supports the upper third (bridge) of the nose. The upper cartilage supports the side of the nose. The lower cartilage adds support, width, and height. It helps shape the nostrils and the tip of the nose. Skin also helps shape the nose.          The nasal cavity is a hollow space behind the nose that air flows through. The septum is a thin "wall" made of cartilage and bone. It divides the inside of the nose into two chambers. The mucous membrane is thin tissue that lines the nose, sinuses, and throat. It warms and moistens the air you breathe in. It also makes the sticky mucus that helps clean the air of dust and other small particles. The turbinates on each side of the nose are curved, bony ridges lined with mucous membrane. They warm and moisten the air you breathe in. The sinuses are hollow, air-filled chambers in " the bone around your nose. Mucus from the sinuses drains into the nasal cavity.  Date Last Reviewed: 11/1/2016  © 6180-9105 [employer]. [address] [place] [address], [place] [address]. All rights reserved. This information is not intended as a substitute for professional medical care. Always follow your healthcare professional's instructions.

## 2020-04-20 NOTE — H&P
Subjective:     A few month history of pain in both hips originally started on the right.  Hadpain management injection to the right hip that improved that was about 3 months ago.  Then increased pain with the left hip.  Pain management.  Much worse pain after injection left hip.  He had an MRI.  His x-rays. Admitted for left hip replacement direct anterior elected by the patient significant wrist discussed especially of his vascular issues    There are no active problems to display for this patient.    Past Medical History:   Diagnosis Date    Hair loss     Hyperlipidemia       No past surgical history on file.   No medications prior to admission.     Review of patient's allergies indicates:   Allergen Reactions    Ciprofloxacin Rash    Metronidazole Rash      Social History     Tobacco Use    Smoking status: Never Smoker    Smokeless tobacco: Never Used   Substance Use Topics    Alcohol use: Yes      Family History   Adopted: Yes   Problem Relation Age of Onset    No Known Problems Mother     No Known Problems Father       Review of Systems  Pertinent items are noted in HPI.    Objective:     No data found.  Heart regular lungs clear laboredambulation with severe pain left hip and groin region difficulty weightbearing    Imaging Review  clerosis and osteophyte formation with collapse of the head.    Assessment:     There are no hospital problems to display for this patient.      Plan:     The various methods of treatment have been discussed with the patient and family.   After consideration of risks, benefits and other options for treatment, the patient has consented to surgical interventions (Wendy arthritis left hip.  Severe pain.  He had pain management.  Actually getting worse.  Left hip is elected for anterior discussed.  Significant wrist with vascular disease).  Questions were answered and Pre-op teaching was done by me.

## 2020-04-29 ENCOUNTER — HOSPITAL ENCOUNTER (OUTPATIENT)
Dept: PREADMISSION TESTING | Facility: OTHER | Age: 75
Discharge: HOME | End: 2020-04-29
Attending: ORTHOPAEDIC SURGERY
Payer: MEDICARE

## 2020-04-29 ENCOUNTER — ANESTHESIA EVENT (OUTPATIENT)
Dept: SURGERY | Facility: OTHER | Age: 75
End: 2020-04-29
Payer: MEDICARE

## 2020-04-29 VITALS
SYSTOLIC BLOOD PRESSURE: 136 MMHG | BODY MASS INDEX: 31.15 KG/M2 | DIASTOLIC BLOOD PRESSURE: 92 MMHG | OXYGEN SATURATION: 97 % | TEMPERATURE: 97 F | HEIGHT: 72 IN | WEIGHT: 230 LBS | HEART RATE: 70 BPM

## 2020-04-29 LAB
ANION GAP SERPL CALC-SCNC: 8 MMOL/L (ref 8–16)
BASOPHILS # BLD AUTO: 0.02 K/UL (ref 0–0.2)
BASOPHILS NFR BLD: 0.3 % (ref 0–1.9)
BILIRUB UR QL STRIP: NEGATIVE
BUN SERPL-MCNC: 20 MG/DL (ref 8–23)
CALCIUM SERPL-MCNC: 9.3 MG/DL (ref 8.7–10.5)
CHLORIDE SERPL-SCNC: 104 MMOL/L (ref 95–110)
CLARITY UR: CLEAR
CO2 SERPL-SCNC: 28 MMOL/L (ref 23–29)
COLOR UR: YELLOW
CREAT SERPL-MCNC: 0.8 MG/DL (ref 0.5–1.4)
DIFFERENTIAL METHOD: ABNORMAL
EOSINOPHIL # BLD AUTO: 0.1 K/UL (ref 0–0.5)
EOSINOPHIL NFR BLD: 2.1 % (ref 0–8)
ERYTHROCYTE [DISTWIDTH] IN BLOOD BY AUTOMATED COUNT: 11.9 % (ref 11.5–14.5)
EST. GFR  (AFRICAN AMERICAN): >60 ML/MIN/1.73 M^2
EST. GFR  (NON AFRICAN AMERICAN): >60 ML/MIN/1.73 M^2
GLUCOSE SERPL-MCNC: 123 MG/DL (ref 70–110)
GLUCOSE UR QL STRIP: NEGATIVE
HCT VFR BLD AUTO: 45.9 % (ref 40–54)
HGB BLD-MCNC: 15.5 G/DL (ref 14–18)
HGB UR QL STRIP: NEGATIVE
IMM GRANULOCYTES # BLD AUTO: 0.02 K/UL (ref 0–0.04)
IMM GRANULOCYTES NFR BLD AUTO: 0.3 % (ref 0–0.5)
KETONES UR QL STRIP: NEGATIVE
LEUKOCYTE ESTERASE UR QL STRIP: NEGATIVE
LYMPHOCYTES # BLD AUTO: 1.8 K/UL (ref 1–4.8)
LYMPHOCYTES NFR BLD: 30.3 % (ref 18–48)
MCH RBC QN AUTO: 30.9 PG (ref 27–31)
MCHC RBC AUTO-ENTMCNC: 33.8 G/DL (ref 32–36)
MCV RBC AUTO: 92 FL (ref 82–98)
MONOCYTES # BLD AUTO: 0.6 K/UL (ref 0.3–1)
MONOCYTES NFR BLD: 9.4 % (ref 4–15)
NEUTROPHILS # BLD AUTO: 3.4 K/UL (ref 1.8–7.7)
NEUTROPHILS NFR BLD: 57.6 % (ref 38–73)
NITRITE UR QL STRIP: NEGATIVE
NRBC BLD-RTO: 0 /100 WBC
PH UR STRIP: 6 [PH] (ref 5–8)
PLATELET # BLD AUTO: 107 K/UL (ref 150–350)
PMV BLD AUTO: 11.5 FL (ref 9.2–12.9)
POTASSIUM SERPL-SCNC: 4.4 MMOL/L (ref 3.5–5.1)
PROT UR QL STRIP: NEGATIVE
RBC # BLD AUTO: 5.01 M/UL (ref 4.6–6.2)
SODIUM SERPL-SCNC: 140 MMOL/L (ref 136–145)
SP GR UR STRIP: 1.02 (ref 1–1.03)
URN SPEC COLLECT METH UR: NORMAL
UROBILINOGEN UR STRIP-ACNC: NEGATIVE EU/DL
WBC # BLD AUTO: 5.85 K/UL (ref 3.9–12.7)

## 2020-04-29 PROCEDURE — 85025 COMPLETE CBC W/AUTO DIFF WBC: CPT

## 2020-04-29 PROCEDURE — 36415 COLL VENOUS BLD VENIPUNCTURE: CPT

## 2020-04-29 PROCEDURE — 80048 BASIC METABOLIC PNL TOTAL CA: CPT

## 2020-04-29 PROCEDURE — 81003 URINALYSIS AUTO W/O SCOPE: CPT

## 2020-04-29 RX ORDER — SODIUM CHLORIDE, SODIUM LACTATE, POTASSIUM CHLORIDE, CALCIUM CHLORIDE 600; 310; 30; 20 MG/100ML; MG/100ML; MG/100ML; MG/100ML
INJECTION, SOLUTION INTRAVENOUS CONTINUOUS
Status: CANCELLED | OUTPATIENT
Start: 2020-04-29

## 2020-04-29 NOTE — ANESTHESIA PREPROCEDURE EVALUATION
04/29/2020  Mao Pearce is a 74 y.o., male.    Anesthesia Evaluation    I have reviewed the Patient Summary Reports.    I have reviewed the Nursing Notes.   I have reviewed the Medications.     Review of Systems  Anesthesia Hx:  No problems with previous Anesthesia  Denies Family Hx of Anesthesia complications.   Denies Personal Hx of Anesthesia complications.   Social:  Non-Smoker    Hematology/Oncology:  Hematology Normal   Oncology Normal     EENT/Dental:EENT/Dental Normal   Cardiovascular:  Cardiovascular Normal Exercise tolerance: good     Pulmonary:  Pulmonary Normal    Renal/:  Renal/ Normal     Musculoskeletal:  Musculoskeletal Normal    Neurological:  Neurology Normal    Endocrine:  Endocrine Normal    Dermatological:  Skin Normal    Psych:  Psychiatric Normal           Physical Exam  General:  Obesity    Airway/Jaw/Neck:  Airway Findings: Mouth Opening: Normal Tongue: Normal  General Airway Assessment: Adult  Mallampati: I  TM Distance: Normal, at least 6 cm  Jaw/Neck Findings:  Neck ROM: Normal ROM      Dental:  Dental Findings: In tact             Anesthesia Plan  Type of Anesthesia, risks & benefits discussed:  Anesthesia Type:  spinal  Patient's Preference:   Intra-op Monitoring Plan: standard ASA monitors  Intra-op Monitoring Plan Comments:   Post Op Pain Control Plan: per primary service following discharge from PACU  Post Op Pain Control Plan Comments:   Induction:    Beta Blocker:         Informed Consent: Patient understands risks and agrees with Anesthesia plan.  Questions answered. Anesthesia consent signed with patient.  ASA Score: 2     Day of Surgery Review of History & Physical:    H&P update referred to the surgeon.     Anesthesia Plan Notes: GA backup        Ready For Surgery From Anesthesia Perspective.

## 2020-04-29 NOTE — DISCHARGE INSTRUCTIONS
Information to Prepare you for your Surgery    PRE-ADMIT TESTING -  [phone]    [address]  [facility]          Your surgery has been scheduled at [facility]. We are pleased to have the opportunity to serve you. For Further Information please call [phone].    On the day of surgery please report to the Information Desk on the 1st floor.    · CONTACT YOUR PHYSICIAN'S OFFICE THE DAY PRIOR TO YOUR SURGERY TO OBTAIN YOUR ARRIVAL TIME.     · The evening before surgery do not eat anything after 9 p.m. ( this includes hard candy, chewing gum and mints).  You may only have GATORADE, POWERADE AND WATER  from 9 p.m. until you leave your home.   DO NOT DRINK ANY LIQUIDS ON THE WAY TO THE HOSPITAL.      SPECIAL MEDICATION INSTRUCTIONS: TAKE medications checked off by the Anesthesiologist on your Medication List.    Angiogram Patients: Take medications as instructed by your physician, including aspirin.     Surgery Patients:    If you take ASPIRIN - Your PHYSICIAN/SURGEON will need to inform you IF/OR when you need to stop taking aspirin prior to your surgery.     Do Not take any medications containing IBUPROFEN.  Do Not Wear any make-up or dark nail polish   (especially eye make-up) to surgery. If you come to surgery with makeup on you will be required to remove the makeup or nail polish.    Do not shave your surgical area at least 5 days prior to your surgery. The surgical prep will be performed at the hospital according to Infection Control regulations.    Leave all valuables at home.   Do Not wear any jewelry or watches, including any metal in body piercings. Jewelry must be removed prior to coming to the hospital.  There is a possibility that rings that are unable to be removed may be cut off if they are on the surgical extremity.    Contact Lens must be removed before surgery. Either do not wear the contact lens or bring a case and solution for  storage.  Please bring a container for eyeglasses or dentures as required.  Bring any paperwork your physician has provided, such as consent forms,  history and physicals, doctor's orders, etc.   Bring comfortable clothes that are loose fitting to wear upon discharge. Take into consideration the type of surgery being performed.  Maintain your diet as advised per your physician the day prior to surgery.      Adequate rest the night before surgery is advised.   Park in the Parking lot behind the hospital or in the [facility] across the street from the parking lot. Parking is complimentary.  If you will be discharged the same day as your procedure, please arrange for a responsible adult to drive you home or to accompany you if traveling by taxi.   YOU WILL NOT BE PERMITTED TO DRIVE OR TO LEAVE THE HOSPITAL ALONE AFTER SURGERY.   It is strongly recommended that you arrange for someone to remain with you for the first 24 hrs following your surgery.    The Surgeon will speak to your family/visitor after your surgery regarding the outcome of your surgery and post op care.  The Surgeon may speak to you after your surgery, but there is a possibility you may not remember the details.  Please check with your family members regarding the conversation with the Surgeon.    We strongly recommend whoever is bringing you home be present for discharge instructions.  This will ensure a thorough understanding for your post op home care.    EACH PATIENT IS ALLOWED TWO FAMILY MEMBERS OR VISITORS IN THE ROOM AND IN THE WAITING ROOMS WHILE YOU ARE IN SURGERY. ALL CHILDREN MUST ALWAYS BE ACCOMPANIED BY AN ADULT.    Thank you for your cooperation.  The Staff of [facility].                Bathing Instructions with Hibiclens     Shower the evening before and morning of your procedure with Hibiclens:   Wash your face with water and your regular face wash/soap   Apply Hibiclens directly on your skin or on a  wet washcloth and wash gently. When showering: Move away from the shower stream when applying Hibiclens to avoid rinsing off too soon.   Rinse thoroughly with warm water   Do not dilute Hibiclens         Dry off as usual, do not use any deodorant, powder, body lotions, perfume, after shave or cologne.

## 2020-04-30 RX ORDER — ATORVASTATIN CALCIUM 40 MG/1
40 TABLET, FILM COATED ORAL NIGHTLY
COMMUNITY

## 2020-05-05 ENCOUNTER — LAB VISIT (OUTPATIENT)
Dept: INTERNAL MEDICINE | Facility: CLINIC | Age: 75
End: 2020-05-05
Payer: MEDICARE

## 2020-05-05 DIAGNOSIS — Z01.818 PRE-OP TESTING: ICD-10-CM

## 2020-05-05 LAB — SARS-COV-2 RNA RESP QL NAA+PROBE: NOT DETECTED

## 2020-05-05 PROCEDURE — U0002 COVID-19 LAB TEST NON-CDC: HCPCS

## 2020-05-07 ENCOUNTER — HOSPITAL ENCOUNTER (OUTPATIENT)
Facility: OTHER | Age: 75
Discharge: HOME HEALTH | End: 2020-05-08
Attending: ORTHOPAEDIC SURGERY | Admitting: ORTHOPAEDIC SURGERY
Payer: MEDICARE

## 2020-05-07 ENCOUNTER — ANESTHESIA (OUTPATIENT)
Dept: SURGERY | Facility: OTHER | Age: 75
End: 2020-05-07
Payer: MEDICARE

## 2020-05-07 DIAGNOSIS — Z01.818 PRE-OP TESTING: ICD-10-CM

## 2020-05-07 DIAGNOSIS — Z01.818 PREOP TESTING: Primary | ICD-10-CM

## 2020-05-07 DIAGNOSIS — R26.81 GAIT INSTABILITY: ICD-10-CM

## 2020-05-07 DIAGNOSIS — M16.9 OA (OSTEOARTHRITIS) OF HIP: ICD-10-CM

## 2020-05-07 PROCEDURE — 71000039 HC RECOVERY, EACH ADD'L HOUR: Performed by: ORTHOPAEDIC SURGERY

## 2020-05-07 PROCEDURE — 63600175 PHARM REV CODE 636 W HCPCS: Performed by: NURSE ANESTHETIST, CERTIFIED REGISTERED

## 2020-05-07 PROCEDURE — 94799 UNLISTED PULMONARY SVC/PX: CPT

## 2020-05-07 PROCEDURE — 97110 THERAPEUTIC EXERCISES: CPT | Mod: CQ

## 2020-05-07 PROCEDURE — 36000711: Performed by: ORTHOPAEDIC SURGERY

## 2020-05-07 PROCEDURE — 71000033 HC RECOVERY, INTIAL HOUR: Performed by: ORTHOPAEDIC SURGERY

## 2020-05-07 PROCEDURE — 63600175 PHARM REV CODE 636 W HCPCS: Performed by: SPECIALIST

## 2020-05-07 PROCEDURE — 97530 THERAPEUTIC ACTIVITIES: CPT | Mod: CQ

## 2020-05-07 PROCEDURE — 63600175 PHARM REV CODE 636 W HCPCS: Performed by: ORTHOPAEDIC SURGERY

## 2020-05-07 PROCEDURE — 63600175 PHARM REV CODE 636 W HCPCS: Performed by: INTERNAL MEDICINE

## 2020-05-07 PROCEDURE — 94761 N-INVAS EAR/PLS OXIMETRY MLT: CPT

## 2020-05-07 PROCEDURE — 27201423 OPTIME MED/SURG SUP & DEVICES STERILE SUPPLY: Performed by: ORTHOPAEDIC SURGERY

## 2020-05-07 PROCEDURE — 97161 PT EVAL LOW COMPLEX 20 MIN: CPT

## 2020-05-07 PROCEDURE — 27800903 OPTIME MED/SURG SUP & DEVICES OTHER IMPLANTS: Performed by: ORTHOPAEDIC SURGERY

## 2020-05-07 PROCEDURE — 36000710: Performed by: ORTHOPAEDIC SURGERY

## 2020-05-07 PROCEDURE — 88304 TISSUE EXAM BY PATHOLOGIST: CPT | Mod: 26,,, | Performed by: PATHOLOGY

## 2020-05-07 PROCEDURE — 25000003 PHARM REV CODE 250: Performed by: ORTHOPAEDIC SURGERY

## 2020-05-07 PROCEDURE — 63600175 PHARM REV CODE 636 W HCPCS: Performed by: ANESTHESIOLOGY

## 2020-05-07 PROCEDURE — C1776 JOINT DEVICE (IMPLANTABLE): HCPCS | Performed by: ORTHOPAEDIC SURGERY

## 2020-05-07 PROCEDURE — 88311 DECALCIFY TISSUE: CPT | Mod: 26,,, | Performed by: PATHOLOGY

## 2020-05-07 PROCEDURE — 88304 TISSUE EXAM BY PATHOLOGIST: CPT | Performed by: PATHOLOGY

## 2020-05-07 PROCEDURE — 88304 PR  SURG PATH,LEVEL III: ICD-10-PCS | Mod: 26,,, | Performed by: PATHOLOGY

## 2020-05-07 PROCEDURE — 37000009 HC ANESTHESIA EA ADD 15 MINS: Performed by: ORTHOPAEDIC SURGERY

## 2020-05-07 PROCEDURE — C9290 INJ, BUPIVACAINE LIPOSOME: HCPCS | Performed by: ORTHOPAEDIC SURGERY

## 2020-05-07 PROCEDURE — 25000003 PHARM REV CODE 250: Performed by: NURSE ANESTHETIST, CERTIFIED REGISTERED

## 2020-05-07 PROCEDURE — 88311 DECALCIFY TISSUE: CPT | Performed by: PATHOLOGY

## 2020-05-07 PROCEDURE — 97116 GAIT TRAINING THERAPY: CPT | Mod: CQ,59

## 2020-05-07 PROCEDURE — 37000008 HC ANESTHESIA 1ST 15 MINUTES: Performed by: ORTHOPAEDIC SURGERY

## 2020-05-07 PROCEDURE — 88311 PR  DECALCIFY TISSUE: ICD-10-PCS | Mod: 26,,, | Performed by: PATHOLOGY

## 2020-05-07 DEVICE — PLUG ELIM HOLE POSITIVE STOP: Type: IMPLANTABLE DEVICE | Site: HIP | Status: FUNCTIONAL

## 2020-05-07 DEVICE — HEAD DLT TS CER 12-14 32MM +1: Type: IMPLANTABLE DEVICE | Site: HIP | Status: FUNCTIONAL

## 2020-05-07 DEVICE — CUP ACT PINNACLE SECTOR 56 TIT: Type: IMPLANTABLE DEVICE | Site: HIP | Status: FUNCTIONAL

## 2020-05-07 DEVICE — IMPLANTABLE DEVICE: Type: IMPLANTABLE DEVICE | Site: HIP | Status: FUNCTIONAL

## 2020-05-07 RX ORDER — SODIUM CHLORIDE 9 MG/ML
INJECTION, SOLUTION INTRAVENOUS CONTINUOUS
Status: DISCONTINUED | OUTPATIENT
Start: 2020-05-07 | End: 2020-05-07

## 2020-05-07 RX ORDER — SODIUM CHLORIDE, SODIUM LACTATE, POTASSIUM CHLORIDE, CALCIUM CHLORIDE 600; 310; 30; 20 MG/100ML; MG/100ML; MG/100ML; MG/100ML
INJECTION, SOLUTION INTRAVENOUS CONTINUOUS
Status: DISCONTINUED | OUTPATIENT
Start: 2020-05-07 | End: 2020-05-07

## 2020-05-07 RX ORDER — CEFAZOLIN SODIUM 1 G/3ML
2 INJECTION, POWDER, FOR SOLUTION INTRAMUSCULAR; INTRAVENOUS
Status: COMPLETED | OUTPATIENT
Start: 2020-05-07 | End: 2020-05-07

## 2020-05-07 RX ORDER — POLYETHYLENE GLYCOL 3350 17 G/17G
17 POWDER, FOR SOLUTION ORAL DAILY
Status: DISCONTINUED | OUTPATIENT
Start: 2020-05-07 | End: 2020-05-08 | Stop reason: HOSPADM

## 2020-05-07 RX ORDER — TRANEXAMIC ACID 100 MG/ML
INJECTION, SOLUTION INTRAVENOUS
Status: DISCONTINUED | OUTPATIENT
Start: 2020-05-07 | End: 2020-05-07 | Stop reason: HOSPADM

## 2020-05-07 RX ORDER — LIDOCAINE HYDROCHLORIDE 20 MG/ML
INJECTION INTRAVENOUS
Status: DISCONTINUED | OUTPATIENT
Start: 2020-05-07 | End: 2020-05-07

## 2020-05-07 RX ORDER — CEFAZOLIN SODIUM 2 G/50ML
2 SOLUTION INTRAVENOUS
Status: COMPLETED | OUTPATIENT
Start: 2020-05-07 | End: 2020-05-08

## 2020-05-07 RX ORDER — FAMOTIDINE 20 MG/1
20 TABLET, FILM COATED ORAL DAILY
Status: DISCONTINUED | OUTPATIENT
Start: 2020-05-07 | End: 2020-05-08 | Stop reason: HOSPADM

## 2020-05-07 RX ORDER — SODIUM CHLORIDE 0.9 % (FLUSH) 0.9 %
5 SYRINGE (ML) INJECTION
Status: DISCONTINUED | OUTPATIENT
Start: 2020-05-07 | End: 2020-05-08 | Stop reason: HOSPADM

## 2020-05-07 RX ORDER — DIPHENHYDRAMINE HYDROCHLORIDE 50 MG/ML
25 INJECTION INTRAMUSCULAR; INTRAVENOUS EVERY 6 HOURS PRN
Status: DISCONTINUED | OUTPATIENT
Start: 2020-05-07 | End: 2020-05-07 | Stop reason: HOSPADM

## 2020-05-07 RX ORDER — MORPHINE SULFATE 4 MG/ML
4 INJECTION, SOLUTION INTRAMUSCULAR; INTRAVENOUS
Status: DISCONTINUED | OUTPATIENT
Start: 2020-05-07 | End: 2020-05-07

## 2020-05-07 RX ORDER — ROPIVACAINE HYDROCHLORIDE 5 MG/ML
INJECTION, SOLUTION EPIDURAL; INFILTRATION; PERINEURAL
Status: DISCONTINUED | OUTPATIENT
Start: 2020-05-07 | End: 2020-05-07

## 2020-05-07 RX ORDER — ONDANSETRON 2 MG/ML
4 INJECTION INTRAMUSCULAR; INTRAVENOUS DAILY PRN
Status: DISCONTINUED | OUTPATIENT
Start: 2020-05-07 | End: 2020-05-07 | Stop reason: HOSPADM

## 2020-05-07 RX ORDER — HYDROCODONE BITARTRATE AND ACETAMINOPHEN 10; 325 MG/1; MG/1
1 TABLET ORAL EVERY 4 HOURS PRN
Status: DISCONTINUED | OUTPATIENT
Start: 2020-05-07 | End: 2020-05-08 | Stop reason: HOSPADM

## 2020-05-07 RX ORDER — HYDROMORPHONE HYDROCHLORIDE 2 MG/ML
0.4 INJECTION, SOLUTION INTRAMUSCULAR; INTRAVENOUS; SUBCUTANEOUS EVERY 5 MIN PRN
Status: DISCONTINUED | OUTPATIENT
Start: 2020-05-07 | End: 2020-05-07 | Stop reason: HOSPADM

## 2020-05-07 RX ORDER — FINASTERIDE 5 MG/1
5 TABLET, FILM COATED ORAL DAILY
Status: DISCONTINUED | OUTPATIENT
Start: 2020-05-07 | End: 2020-05-08 | Stop reason: HOSPADM

## 2020-05-07 RX ORDER — PROPOFOL 10 MG/ML
VIAL (ML) INTRAVENOUS CONTINUOUS PRN
Status: DISCONTINUED | OUTPATIENT
Start: 2020-05-07 | End: 2020-05-07

## 2020-05-07 RX ORDER — HYDROCODONE BITARTRATE AND ACETAMINOPHEN 5; 325 MG/1; MG/1
1 TABLET ORAL EVERY 4 HOURS PRN
Status: DISCONTINUED | OUTPATIENT
Start: 2020-05-07 | End: 2020-05-08 | Stop reason: SDUPTHER

## 2020-05-07 RX ORDER — DEXTROSE MONOHYDRATE AND SODIUM CHLORIDE 5; .9 G/100ML; G/100ML
INJECTION, SOLUTION INTRAVENOUS CONTINUOUS
Status: DISCONTINUED | OUTPATIENT
Start: 2020-05-07 | End: 2020-05-08 | Stop reason: HOSPADM

## 2020-05-07 RX ORDER — ATORVASTATIN CALCIUM 20 MG/1
40 TABLET, FILM COATED ORAL NIGHTLY
Status: DISCONTINUED | OUTPATIENT
Start: 2020-05-07 | End: 2020-05-08 | Stop reason: HOSPADM

## 2020-05-07 RX ORDER — BUPIVACAINE HYDROCHLORIDE AND EPINEPHRINE 2.5; 5 MG/ML; UG/ML
INJECTION, SOLUTION EPIDURAL; INFILTRATION; INTRACAUDAL; PERINEURAL
Status: DISCONTINUED | OUTPATIENT
Start: 2020-05-07 | End: 2020-05-07 | Stop reason: HOSPADM

## 2020-05-07 RX ORDER — TALC
9 POWDER (GRAM) TOPICAL NIGHTLY PRN
Status: DISCONTINUED | OUTPATIENT
Start: 2020-05-07 | End: 2020-05-08 | Stop reason: HOSPADM

## 2020-05-07 RX ORDER — DEXAMETHASONE SODIUM PHOSPHATE 4 MG/ML
INJECTION, SOLUTION INTRA-ARTICULAR; INTRALESIONAL; INTRAMUSCULAR; INTRAVENOUS; SOFT TISSUE
Status: DISCONTINUED | OUTPATIENT
Start: 2020-05-07 | End: 2020-05-07

## 2020-05-07 RX ORDER — ALPRAZOLAM 0.5 MG/1
1 TABLET ORAL 2 TIMES DAILY PRN
Status: DISCONTINUED | OUTPATIENT
Start: 2020-05-07 | End: 2020-05-08 | Stop reason: HOSPADM

## 2020-05-07 RX ORDER — SODIUM CHLORIDE 0.9 % (FLUSH) 0.9 %
3 SYRINGE (ML) INJECTION
Status: DISCONTINUED | OUTPATIENT
Start: 2020-05-07 | End: 2020-05-08 | Stop reason: HOSPADM

## 2020-05-07 RX ORDER — ASPIRIN 81 MG/1
81 TABLET ORAL DAILY
Status: DISCONTINUED | OUTPATIENT
Start: 2020-05-07 | End: 2020-05-08 | Stop reason: HOSPADM

## 2020-05-07 RX ORDER — EPHEDRINE SULFATE 50 MG/ML
INJECTION, SOLUTION INTRAVENOUS
Status: DISCONTINUED | OUTPATIENT
Start: 2020-05-07 | End: 2020-05-07

## 2020-05-07 RX ORDER — PROPOFOL 10 MG/ML
VIAL (ML) INTRAVENOUS
Status: DISCONTINUED | OUTPATIENT
Start: 2020-05-07 | End: 2020-05-07

## 2020-05-07 RX ORDER — OXYCODONE HYDROCHLORIDE 5 MG/1
5 TABLET ORAL
Status: DISCONTINUED | OUTPATIENT
Start: 2020-05-07 | End: 2020-05-07 | Stop reason: HOSPADM

## 2020-05-07 RX ORDER — MORPHINE SULFATE 4 MG/ML
4 INJECTION, SOLUTION INTRAMUSCULAR; INTRAVENOUS ONCE
Status: COMPLETED | OUTPATIENT
Start: 2020-05-07 | End: 2020-05-07

## 2020-05-07 RX ORDER — MORPHINE SULFATE 10 MG/ML
5 INJECTION INTRAMUSCULAR; INTRAVENOUS; SUBCUTANEOUS
Status: DISCONTINUED | OUTPATIENT
Start: 2020-05-07 | End: 2020-05-08 | Stop reason: HOSPADM

## 2020-05-07 RX ORDER — ONDANSETRON 2 MG/ML
INJECTION INTRAMUSCULAR; INTRAVENOUS
Status: DISCONTINUED | OUTPATIENT
Start: 2020-05-07 | End: 2020-05-07

## 2020-05-07 RX ORDER — MIDAZOLAM HYDROCHLORIDE 1 MG/ML
INJECTION INTRAMUSCULAR; INTRAVENOUS
Status: DISCONTINUED | OUTPATIENT
Start: 2020-05-07 | End: 2020-05-07

## 2020-05-07 RX ORDER — ONDANSETRON 8 MG/1
8 TABLET, ORALLY DISINTEGRATING ORAL EVERY 8 HOURS PRN
Status: DISCONTINUED | OUTPATIENT
Start: 2020-05-07 | End: 2020-05-08 | Stop reason: HOSPADM

## 2020-05-07 RX ORDER — MEPERIDINE HYDROCHLORIDE 25 MG/ML
12.5 INJECTION INTRAMUSCULAR; INTRAVENOUS; SUBCUTANEOUS ONCE AS NEEDED
Status: DISCONTINUED | OUTPATIENT
Start: 2020-05-07 | End: 2020-05-07 | Stop reason: HOSPADM

## 2020-05-07 RX ADMIN — EPHEDRINE SULFATE 10 MG: 50 INJECTION INTRAMUSCULAR; INTRAVENOUS; SUBCUTANEOUS at 06:05

## 2020-05-07 RX ADMIN — PROPOFOL 25 MG: 10 INJECTION, EMULSION INTRAVENOUS at 06:05

## 2020-05-07 RX ADMIN — ROPIVACAINE HYDROCHLORIDE 3 ML: 5 INJECTION, SOLUTION EPIDURAL; INFILTRATION; PERINEURAL at 06:05

## 2020-05-07 RX ADMIN — CEFAZOLIN SODIUM 2 G: 2 SOLUTION INTRAVENOUS at 11:05

## 2020-05-07 RX ADMIN — FINASTERIDE 5 MG: 5 TABLET, FILM COATED ORAL at 01:05

## 2020-05-07 RX ADMIN — PROMETHAZINE HYDROCHLORIDE 6.25 MG: 25 INJECTION INTRAMUSCULAR; INTRAVENOUS at 05:05

## 2020-05-07 RX ADMIN — HYDROCODONE BITARTRATE AND ACETAMINOPHEN 1 TABLET: 10; 325 TABLET ORAL at 05:05

## 2020-05-07 RX ADMIN — MIDAZOLAM HYDROCHLORIDE 2 MG: 1 INJECTION, SOLUTION INTRAMUSCULAR; INTRAVENOUS at 06:05

## 2020-05-07 RX ADMIN — MORPHINE SULFATE 5 MG: 10 INJECTION INTRAVENOUS at 04:05

## 2020-05-07 RX ADMIN — HYDROMORPHONE HYDROCHLORIDE 0.4 MG: 2 INJECTION, SOLUTION INTRAMUSCULAR; INTRAVENOUS; SUBCUTANEOUS at 09:05

## 2020-05-07 RX ADMIN — EPHEDRINE SULFATE 10 MG: 50 INJECTION INTRAMUSCULAR; INTRAVENOUS; SUBCUTANEOUS at 07:05

## 2020-05-07 RX ADMIN — ALPRAZOLAM 1 MG: 0.5 TABLET ORAL at 01:05

## 2020-05-07 RX ADMIN — SODIUM CHLORIDE, SODIUM LACTATE, POTASSIUM CHLORIDE, AND CALCIUM CHLORIDE: 600; 310; 30; 20 INJECTION, SOLUTION INTRAVENOUS at 06:05

## 2020-05-07 RX ADMIN — ASPIRIN 81 MG: 81 TABLET, COATED ORAL at 01:05

## 2020-05-07 RX ADMIN — CEFAZOLIN SODIUM 2 G: 2 SOLUTION INTRAVENOUS at 04:05

## 2020-05-07 RX ADMIN — ONDANSETRON 4 MG: 2 INJECTION INTRAMUSCULAR; INTRAVENOUS at 06:05

## 2020-05-07 RX ADMIN — HYDROCODONE BITARTRATE AND ACETAMINOPHEN 1 TABLET: 5; 325 TABLET ORAL at 08:05

## 2020-05-07 RX ADMIN — PROPOFOL 85 MCG/KG/MIN: 10 INJECTION, EMULSION INTRAVENOUS at 06:05

## 2020-05-07 RX ADMIN — MORPHINE SULFATE 4 MG: 4 INJECTION, SOLUTION INTRAMUSCULAR; INTRAVENOUS at 01:05

## 2020-05-07 RX ADMIN — FAMOTIDINE 20 MG: 20 TABLET ORAL at 01:05

## 2020-05-07 RX ADMIN — HYDROCODONE BITARTRATE AND ACETAMINOPHEN 1 TABLET: 10; 325 TABLET ORAL at 12:05

## 2020-05-07 RX ADMIN — MORPHINE SULFATE 4 MG: 4 INJECTION, SOLUTION INTRAMUSCULAR; INTRAVENOUS at 02:05

## 2020-05-07 RX ADMIN — CEFAZOLIN 2 G: 330 INJECTION, POWDER, FOR SOLUTION INTRAMUSCULAR; INTRAVENOUS at 07:05

## 2020-05-07 RX ADMIN — EPHEDRINE SULFATE 5 MG: 50 INJECTION INTRAMUSCULAR; INTRAVENOUS; SUBCUTANEOUS at 06:05

## 2020-05-07 RX ADMIN — LIDOCAINE HYDROCHLORIDE 100 MG: 20 INJECTION, SOLUTION INTRAVENOUS at 06:05

## 2020-05-07 RX ADMIN — PROPOFOL 25 MG: 10 INJECTION, EMULSION INTRAVENOUS at 07:05

## 2020-05-07 RX ADMIN — EPHEDRINE SULFATE 10 MG: 50 INJECTION INTRAMUSCULAR; INTRAVENOUS; SUBCUTANEOUS at 08:05

## 2020-05-07 RX ADMIN — HYDROCODONE BITARTRATE AND ACETAMINOPHEN 1 TABLET: 10; 325 TABLET ORAL at 11:05

## 2020-05-07 RX ADMIN — ATORVASTATIN CALCIUM 40 MG: 20 TABLET, FILM COATED ORAL at 08:05

## 2020-05-07 RX ADMIN — DEXTROSE AND SODIUM CHLORIDE: 5; .9 INJECTION, SOLUTION INTRAVENOUS at 10:05

## 2020-05-07 RX ADMIN — EPHEDRINE SULFATE 5 MG: 50 INJECTION INTRAMUSCULAR; INTRAVENOUS; SUBCUTANEOUS at 08:05

## 2020-05-07 RX ADMIN — DEXAMETHASONE SODIUM PHOSPHATE 4 MG: 4 INJECTION, SOLUTION INTRAMUSCULAR; INTRAVENOUS at 06:05

## 2020-05-07 NOTE — PLAN OF CARE
LMSW completed a telephone assessment with the patient.      Patient is alert and oriented with no communication barriers.     Prior to admission patient was independent. Patient is not current with HH. Patient has crutches in the home. LMSW talked to the patient about DME recommendations. Patient is agreeable to a RW. Patient declines the BSC. LMSW talked to the patient about HH. Patient refused HH.     Patient states he does not see a PCP and does not want one. Patient choice pharmacy is [facility] on [address].     Patients friend will transport the patient home at discharge.     CM will order RW. AZUL to deliver once approved.          05/07/20 1320   Discharge Assessment   Assessment Type Discharge Planning Assessment   Confirmed/corrected address and phone number on facesheet? Yes   Assessment information obtained from? Patient   Communicated expected length of stay with patient/caregiver no   Prior to hospitilization cognitive status: Alert/Oriented   Prior to hospitalization functional status: Independent   Current cognitive status: Alert/Oriented   Current Functional Status: Assistive Equipment   Lives With alone   Able to Return to Prior Arrangements yes   Is patient able to care for self after discharge? Yes   Patient's perception of discharge disposition home or selfcare   Readmission Within the Last 30 Days no previous admission in last 30 days   Patient currently being followed by outpatient case management? No   Patient currently receives any other outside agency services? No   Equipment Currently Used at Home none   Do you have any problems affording any of your prescribed medications? No   Is the patient taking medications as prescribed? yes   Does the patient have transportation home? Yes   Transportation Anticipated family or friend will provide   Does the patient receive services at the [facility]? No   Discharge Plan A Home   DME Needed Upon Discharge  walker, rolling   Patient/Family in  Agreement with Plan yes

## 2020-05-07 NOTE — PT/OT/SLP PROGRESS
Physical Therapy Treatment    Patient Name:  Mao Pearce   MRN:  [id number]    Recommendations:     Discharge Recommendations:  home health PT   Discharge Equipment Recommendations: walker, rolling, 3-in-1 commode, shower chair   Barriers to discharge: None (pt reports having a friend avail for a few days)    Assessment:     Mao Pearce is a 74 y.o. male admitted with a medical diagnosis of OA (osteoarthritis) of hip.  He presents with the following impairments/functional limitations:  weakness, impaired endurance, impaired self care skills, impaired functional mobilty, gait instability, impaired balance, decreased lower extremity function, pain, decreased ROM, edema, impaired skin, orthopedic precautions ;pt with good mobility, though still c/o 6-7/10 pain.    Rehab Prognosis: Good; patient would benefit from acute skilled PT services to address these deficits and reach maximum level of function.    Recent Surgery: Procedure(s) (LRB):  ARTHROPLASTY, HIP REPLACEMENT (Left) Day of Surgery    Plan:     During this hospitalization, patient to be seen BID to address the identified rehab impairments via gait training, therapeutic activities, therapeutic exercises and progress toward the following goals:    · Plan of Care Expires:  06/07/20    Subjective     Chief Complaint: pain in L hip  Patient/Family Comments/goals: pt agreeable to session, reports feeling a little woozy from meds.  Pain/Comfort:  · Pain Rating 1: 6/10  · Location - Side 1: Left  · Location - Orientation 1: anterior  · Location 1: hip  · Pain Addressed 1: Pre-medicate for activity, Reposition, Distraction, Nurse notified, Cessation of Activity  · Pain Rating Post-Intervention 1: 7/10(with amb)      Objective:     Communicated with nurse prior to session.  Patient found up in chair with salvador catheter, peripheral IV upon PT entry to room.     General Precautions: Standard, fall   Orthopedic Precautions:LLE weight bearing as tolerated, LLE  anterior precautions   Braces: N/A     Functional Mobility:  · Bed Mobility:     · Sit to Supine: contact guard assistance  · Transfers:     · Sit to Stand:  contact guard assistance with rolling walker  · Gait: amb'd ~120' with RW and CGA/SBA, WBAT on LLE, step through gt pattern with cueing      AM-PAC 6 CLICK MOBILITY  Turning over in bed (including adjusting bedclothes, sheets and blankets)?: 3  Sitting down on and standing up from a chair with arms (e.g., wheelchair, bedside commode, etc.): 3  Moving from lying on back to sitting on the side of the bed?: 3  Moving to and from a bed to a chair (including a wheelchair)?: 3  Need to walk in hospital room?: 3  Climbing 3-5 steps with a railing?: 3  Basic Mobility Total Score: 18       Therapeutic Activities and Exercises:   pt inst'd in ant hip prec.  Pt perf'd supine AP's, QS, GS, heelslides x 10 ea.     Patient left HOB elevated with all lines intact, call button in reach, nurse notified and SCD's donned and coldpack to L ant hip..    GOALS:   Multidisciplinary Problems     Physical Therapy Goals        Problem: Physical Therapy Goal    Goal Priority Disciplines Outcome Goal Variances Interventions   Physical Therapy Goal     PT, PT/OT Ongoing, Progressing     Description:  Goals to be met by: 2020    Patient will increase functional independence with mobility by performin. Sit<>stand with SPV with RW.  2. Gait x 200ft feet with RW with SPV.  3. Ascend/descend 1 step(s) with least restrictive assistive device and SBA.                     Time Tracking:     PT Received On: 20  PT Start Time: 1430     PT Stop Time: 1508  PT Total Time (min): 38 min     Billable Minutes: Gait Training 20, Therapeutic Activity 8 and Therapeutic Exercise 10    Treatment Type: Treatment  PT/PTA: PTA     PTA Visit Number: 0     Janey Bee PTA  2020

## 2020-05-07 NOTE — INTERVAL H&P NOTE
The patient has been examined and the H&P has been reviewed:    I concur with the findings and no changes have occurred since H&P was written.    Anesthesia/Surgery risks, benefits and alternative options discussed and understood by patient/family.          Active Hospital Problems    Diagnosis  POA    OA (osteoarthritis) of hip [M16.9]  Yes      Resolved Hospital Problems   No resolved problems to display.

## 2020-05-07 NOTE — TRANSFER OF CARE
Anesthesia Transfer of Care Note    Patient: Mao Pearce    Procedure(s) Performed: Procedure(s) (LRB):  ARTHROPLASTY, HIP REPLACEMENT (Left)    Patient location: PACU    Anesthesia Type: spinal    Transport from OR: Transported from OR on 2-3 L/min O2 by NC with adequate spontaneous ventilation    Post pain: adequate analgesia    Post assessment: tolerated procedure well and no apparent anesthetic complications    Post vital signs: stable    Level of consciousness: awake, alert and oriented    Nausea/Vomiting: no nausea/vomiting    Complications: none    Transfer of care protocol was followed      Last vitals:   Visit Vitals  /69 (BP Location: Right arm, Patient Position: Lying)   Pulse 82   Temp 36.3 °C (97.4 °F) (Oral)   Resp 16   Ht 6' (1.829 m)   Wt 104.3 kg (230 lb)   SpO2 100%   BMI 31.19 kg/m²

## 2020-05-07 NOTE — ANESTHESIA PROCEDURE NOTES
Spinal    Diagnosis: L HIP  Patient location during procedure: holding area  Start time: 5/7/2020 6:37 AM  Timeout: 5/7/2020 6:37 AM  End time: 5/7/2020 6:41 AM    Staffing  Authorizing Provider: Hossein Landeros MD  Performing Provider: Hossein Landeros MD    Preanesthetic Checklist  Completed: patient identified, site marked, surgical consent, pre-op evaluation, timeout performed, IV checked, risks and benefits discussed and monitors and equipment checked  Spinal Block  Patient position: sitting  Prep: ChloraPrep  Patient monitoring: heart rate, cardiac monitor, continuous pulse ox and frequent blood pressure checks  Approach: right paramedian  Location: L3-4  Injection technique: single shot  CSF Fluid: clear free-flowing CSF  Needle  Needle type: pencil-tip   Needle gauge: 25 G  Needle length: 3.5 in  Additional Documentation: incremental injection, negative aspiration for heme and no paresthesia on injection  Needle localization: anatomical landmarks  Assessment  Sensory level: T5   Dermatomal levels determined by alcohol wipe and pinch or prick  Ease of block: easy  Patient's tolerance of the procedure: comfortable throughout block and no complaints

## 2020-05-07 NOTE — CONSULTS
REASON FOR CONSULTATION: Concurrent medical management     HPI:74 y.o. male with severe OA of left hip underwent Left hip replacement deep U direct anterior per Dr. Cox today under general anesthesia.  NO PONV.  No reported complications.  He has a history of dyslipidemia, BPH, and anxiety for which he takes prn Xanax.  Reports he has 10/10 pain and has all afternoon, and that his meds cut his pain to about a 4/10 but he is unable to sleep.    REVIEW OF SYSTEMS:    Constitution: Negative for chills and fever.   HENT: Negative for congestion and sore throat.    Eyes: Negative for blurred vision.   Cardiovascular: Negative for chest pain.   Respiratory: Negative for cough and shortness of breath.    Skin: Negative for rash.   Musculoskeletal: Negative for back pain and joint pain + hip pain  Gastrointestinal: Negative for abdominal pain, diarrhea, nausea and vomiting.   Neurological: Negative for headaches.   Psychiatric/Behavioral: The patient is not nervous/anxious.      Past Medical History:   Diagnosis Date    Hair loss     Hyperlipidemia        Past Surgical History:   Procedure Laterality Date    KNEE ARTHROSCOPY      TONSILLECTOMY         Review of patient's allergies indicates:   Allergen Reactions    Ciprofloxacin Rash    Metronidazole Rash       Medications Prior to Admission   Medication Sig Dispense Refill Last Dose    ALPRAZolam (XANAX) 1 MG tablet TK 1 T PO QHS PRN  1 5/6/2020 at Unknown time    aspirin (ECOTRIN) 81 MG EC tablet Take 81 mg by mouth once daily.   5/6/2020 at Unknown time    atorvastatin (LIPITOR) 40 MG tablet Take 40 mg by mouth every evening.   5/6/2020 at Unknown time    finasteride (PROSCAR) 5 mg tablet TK 1/2 T PO QD  3 Past Week at Unknown time    fish oil-omega-3 fatty acids 300-1,000 mg capsule Take 2 g by mouth once daily.   Past Week at Unknown time       Current Facility-Administered Medications   Medication Dose Route Frequency Provider Last Rate Last Dose     ALPRAZolam tablet 1 mg  1 mg Oral BID PRN Celestine Cox MD   1 mg at 05/07/20 1353    aspirin EC tablet 81 mg  81 mg Oral Daily Celestine Cox MD   81 mg at 05/07/20 1353    atorvastatin tablet 40 mg  40 mg Oral QHS Celestine Cox MD        cefazolin (ANCEF) 2 gram in dextrose 5% 50 mL IVPB (premix)  2 g Intravenous Q8H Celestine Cox MD        dextrose 5 % and 0.9 % NaCl infusion   Intravenous Continuous Celestine Cox MD 75 mL/hr at 05/07/20 1059      famotidine tablet 20 mg  20 mg Oral Daily Celestine Cox MD   20 mg at 05/07/20 1354    finasteride tablet 5 mg  5 mg Oral Daily Celestine Cox MD   5 mg at 05/07/20 1354    HYDROcodone-acetaminophen  mg per tablet 1 tablet  1 tablet Oral Q4H PRN Celestine Cox MD   1 tablet at 05/07/20 1208    HYDROcodone-acetaminophen 5-325 mg per tablet 1 tablet  1 tablet Oral Q4H PRN Celestine Cox MD        melatonin tablet 9 mg  9 mg Oral Nightly PRN Celestine Cox MD        morphine injection 4 mg  4 mg Intravenous Q3H PRN Celestine Cox MD   4 mg at 05/07/20 1310    ondansetron disintegrating tablet 8 mg  8 mg Oral Q8H PRN Celestine Cox MD        polyethylene glycol packet 17 g  17 g Oral Daily Celestine Cox MD        promethazine (PHENERGAN) 6.25 mg in dextrose 5 % 50 mL IVPB  6.25 mg Intravenous Q6H PRN Celestine Cox MD        sodium chloride 0.9% flush 3 mL  3 mL Intravenous PRN Hossein Landeros MD        sodium chloride 0.9% flush 5 mL  5 mL Intravenous PRN Celestine Cox MD           Social History     Socioeconomic History    Marital status: Single     Spouse name: Not on file    Number of children: Not on file    Years of education: Not on file    Highest education level: Not on file   Occupational History    Not on file   Social Needs    Financial resource strain: Not on file    Food insecurity:     Worry: Not on file     Inability: Not on file    Transportation needs:     Medical:  Not on file     Non-medical: Not on file   Tobacco Use    Smoking status: Never Smoker    Smokeless tobacco: Never Used   Substance and Sexual Activity    Alcohol use: Yes    Drug use: Not on file    Sexual activity: Not on file   Lifestyle    Physical activity:     Days per week: Not on file     Minutes per session: Not on file    Stress: Not on file   Relationships    Social connections:     Talks on phone: Not on file     Gets together: Not on file     Attends Sikhism service: Not on file     Active member of club or organization: Not on file     Attends meetings of clubs or organizations: Not on file     Relationship status: Not on file   Other Topics Concern    Not on file   Social History Narrative    Not on file       Family History   Adopted: Yes   Problem Relation Age of Onset    No Known Problems Mother     No Known Problems Father        Temp:  [97.4 °F (36.3 °C)-97.8 °F (36.6 °C)] 97.8 °F (36.6 °C)  Pulse:  [68-85] 85  Resp:  [16-18] 16  SpO2:  [96 %-100 %] 96 %  BP: (128-163)/() 140/85      PHYSICAL EXAM:    GEN:  Alert and Oriented x4, NAD, appropriate affect  HEENT:  NCAT, HARRIS, No conjunctivitis, normal sclera, O/P clear, no oropharyngial lesions, no thrush, neck supple, No LAD, Nml JVD, no carotid bruits  CV:  RRR no MRG  Lungs:  CTAb, no rhonchi, wheeze, crackles, normal excursion  Abdomen: Obese, soft, NTND, no fluid wave, no HSM, NABS  Ext:  No CCE, normal DP pulses bilat L hip wound CDI  Neuro:  Non-Focal, EOMI, gait not assessed  Skin:  No rash, excoriation, petchiae    Labs and radiology reviewed    ASSESSMENT AND PLAN:    POD #0  Left hip replacement   -Per ortho team  -No intraop complications noted  -ASA 81 mg  -Patient has not had adequate pain relief will adjust medication regimen    Dyslipidemia  -Continue statin per home meds    BPH  -Continue finasteride and monitor for urinary retention

## 2020-05-07 NOTE — ANESTHESIA POSTPROCEDURE EVALUATION
Anesthesia Post Evaluation    Patient: Mao Pearce    Procedure(s) Performed: Procedure(s) (LRB):  ARTHROPLASTY, HIP REPLACEMENT (Left)    Final Anesthesia Type: spinal    Patient location during evaluation: PACU  Patient participation: Yes- Able to Participate  Level of consciousness: awake and alert  Post-procedure vital signs: reviewed and stable  Pain management: adequate  Airway patency: patent    PONV status at discharge: No PONV  Anesthetic complications: no      Cardiovascular status: blood pressure returned to baseline and hemodynamically stable  Respiratory status: unassisted and room air  Hydration status: euvolemic  Follow-up not needed.          Vitals Value Taken Time   /80 5/7/2020 10:06 AM   Temp 36.4 °C (97.5 °F) 5/7/2020  9:28 AM   Pulse 74 5/7/2020 10:17 AM   Resp 16 5/7/2020  9:28 AM   SpO2 100 % 5/7/2020 10:17 AM   Vitals shown include unvalidated device data.      Event Time     Out of Recovery 10:32:01          Pain/Kyleigh Score: Pain Rating Prior to Med Admin: 4 (5/7/2020  9:55 AM)  Pain Rating Post Med Admin: 3 (states tolerable) (5/7/2020 10:00 AM)  Kyleigh Score: 9 (5/7/2020 10:15 AM)

## 2020-05-07 NOTE — OP NOTE
DATE OF PROCEDURE: 05/07/2020     CHIEF COMPLAINT AND PRESENT ILLNESS:   74-year-old with worsening pain decreased range of motion left hip admitted for left hip replacement elected for direct anterior understanding increased risks     PREOPERATIVE DIAGNOSIS:  Osteoarthritis left hip     POSTOPERATIVE DIAGNOSIS:   same     PROCEDURES PERFORMED:   left hip replacement deep U direct anterior    SURGEON:  Celestine Cox M.D.     ASSISTANT:  Sulma Lr CST.     COMPLICATIONS:   none     ANESTHESIA:  Spinal     BLOOD LOSS:   300     IMPLANTS:  10 lateral/32/56 Actos deep U     PROCEDURE IN DETAIL:   patient underwent spinal anesthesia placed carefully on the fracture table left hip prepped and draped in usual fashion.  Using a 10 cm direct anterior incision for a Serrano Gómez approach sharp dissection made down to the fascia fascia was incised got on the edge of the tensor fascia full vicki between that and the sartorius.  Got the interval.  Put a retractor on the lateral neck.  Put the anterior retractor.  The capsule was dissected out.  Hemostasis was used on the circumflex artery.  A capsulotomy was performed.  The neck was evaluated.  A standard neck cut was performed using napkin ring technique.  Head was removed.  Sizing appeared to be about a 56.  Attention then turned to the acetabulum.  Acetabular retractors were placed.  Progressive reaming up to 55 which had excellent central and peripheral fixation.  This was confirmed with the C-arm.  A 56 cup was then placed about 15° of anteversion and 45° of horizontal.  Excellent placement and stability.  Thirty-two liner placed.  Attention then turned to the femur.  The capsular releases were performed.  The releases at the trochanter were performed.  The retractors were placed.  The femur was brought up in out.  Cookie cutter then progressive broaching with the pneumatic hammer.  Up to a 10.  Excellent fixation and stability.  With the trial lateral showed the  appropriate amount of offset excellent stability and leg lengths were equal confirmed by C-arm.  The appropriate components opened placed atraumatically.  Ceramic head placed.  Final reduction again excellent alignment and appropriate sizing.  1.  Vicryl was used on the capsule.  1.  Vicryl on the fascial layer to 0 Vicryl subcutaneously Steri-Strips on skin 0.5% ropivacaine Marcaine and tranexamic acid were instilled in the soft tissues.  He was placed in a sterile bandage brought to come sterile fashion leg lengths equal    This performed with a poor recognition system

## 2020-05-07 NOTE — OR NURSING
Pt continues without c/o pain at this time. No change from previous assessment. Prepared for transfer to inpt room 356.

## 2020-05-07 NOTE — PT/OT/SLP EVAL
Physical Therapy Evaluation    Patient Name:  Mao Pearce   MRN:  [id number]    Recommendations:     Discharge Recommendations:  home health PT   Discharge Equipment Recommendations: walker, rolling, 3-in-1 commode, shower chair   Barriers to discharge: None    Assessment:     Mao Pearce is a 74 y.o. male admitted with a medical diagnosis of OA (osteoarthritis) of hip.  He presents with the following impairments/functional limitations:  impaired self care skills, impaired functional mobilty, gait instability, impaired balance, decreased coordination, decreased lower extremity function, pain, decreased ROM, impaired skin, orthopedic precautions, impaired joint extensibility . Pt  with functional mobility deficits and should benefit from  PT  to maximize I and safety decrease risk of further decline of injury.    Rehab Prognosis: Good; patient would benefit from acute skilled PT services to address these deficits and reach maximum level of function.    Recent Surgery: Procedure(s) (LRB):  ARTHROPLASTY, HIP REPLACEMENT (Left) Day of Surgery    Plan:     During this hospitalization, patient to be seen BID to address the identified rehab impairments via gait training, therapeutic activities, therapeutic exercises and progress toward the following goals:    · Plan of Care Expires:  05/07/20    Subjective     Chief Complaint: L hip pain  Patient/Family Comments/goals: to go home  Pain/Comfort:  · Pain Rating 1: 10/10  · Location - Side 1: Left  · Location - Orientation 1: generalized  · Location 1: hip  · Pain Addressed 1: Pre-medicate for activity, Reposition, Cessation of Activity, Nurse notified  · Pain Rating Post-Intervention 1: 10/10    Patients cultural, spiritual, Caodaism conflicts given the current situation: no    Living Environment:  Pt lives alone in [address], reports will have a friend over the first night to assist PRN  Prior to admission, patients level of function was I, until one month  ago when pt started using single crutch due to pain.  Equipment used at home: crutches, axillary(single).  DME owned (not currently used): none.  Upon discharge, patient will have assistance from a friend for one night.    Objective:     Communicated with nurse prior to session.  Patient found supine with salvador catheter, peripheral IV  upon PT entry to room.    General Precautions: Standard, fall   Orthopedic Precautions:LLE anterior precautions   Braces: N/A     Exams:  · Cognition:   · Patient is oriented to name, , date, place, situation.  · Pt follows approximately 100% of one step commands.    · Mood: Pleasant and cooperative.   · Musculoskeletal:  · Posture: Protective guarding surgical joint  · LE ROM/Strength: 5/5 bilateral ankle dorsiflexion and plantarflexion, nonsurgical hip flexion and extension, and non surgical knee flexion and extension. Surgical hip flexion/extension and knee flexion/ext grossly 3+/5 but formal MMT deferred at this time. AROM surgical hip flexion limited to 90 degrees. AROM nonsurgical extremity WFL.  · Neuromuscular:  · Sensation: Intact to light touch bilateral LEs. Pt denied paresthesias.   · Coordination/Tone/Reflexes: No impairments identified with functional mobility. No formal testing performed.   · Balance: CGA for dynamic standing with bilateral UE support.   · Visual-vestibular: No impairments identified with functional mobility. No formal testing performed.  · Integument:  Visible skin intact and surgical extremity dressing clean and dry.   Functional Mobility:  · Bed Mobility:     · Supine to Sit: stand by assistance  · Transfers:     · Sit to Stand:  minimum assistance with rolling walker  · Bed to Chair: minimum assistance with  rolling walker  using  Step Transfer  · Gait: Gait x 12ft with RW, CGA Gait deviations of decreased stride length with step to gait pattern, decreased surgical hip flexion, and decreased surgical heel strike/toe off. Increased double limb  support time. With verbal cues noted improvements in stride length, heel strike                Therapeutic Activities and Exercises:    Pt presented supine in bed agreeable to PT.  Bed mobility supine>sit EOB with  SBA and instruction for techniques and sequencing. Static sitting EOB x 4 min w/o s/s of dizziness. Transfer sit>stand with min A, with instruction for hand placement. Gait x 12ft with RW, CGA Gait deviations of decreased stride length with step to gait pattern, decreased surgical hip flexion, and decreased surgical heel strike/toe off. Increased double limb support time. With verbal cues noted improvements in stride length, heel strike. Pt transfer to [facility] with CGA instruction for hand placement.  Attempted there ex pt c/o 10/10 pain, deferred.    AM-PAC 6 CLICK MOBILITY  Total Score:18     Patient left up in chair with all lines intact, call button in reach and nurse notified.    GOALS:   Multidisciplinary Problems     Physical Therapy Goals        Problem: Physical Therapy Goal    Goal Priority Disciplines Outcome Goal Variances Interventions   Physical Therapy Goal     PT, PT/OT Ongoing, Progressing     Description:  Goals to be met by: 2020    Patient will increase functional independence with mobility by performin. Sit<>stand with SPV with RW.  2. Gait x 200ft feet with RW with SPV.  3. Ascend/descend 1 step(s) with least restrictive assistive device and SBA.                     History:     Past Medical History:   Diagnosis Date    Hair loss     Hyperlipidemia        Past Surgical History:   Procedure Laterality Date    KNEE ARTHROSCOPY      TONSILLECTOMY         Time Tracking:     PT Received On: 20  PT Start Time: 1235     PT Stop Time: 1301  PT Total Time (min): 26 min     Billable Minutes: Evaluation 15 and Therapeutic Activity 11      Nader Zaman, LIBRA  2020

## 2020-05-07 NOTE — PLAN OF CARE
Problem: Physical Therapy Goal  Goal: Physical Therapy Goal  Description  Goals to be met by: 2020    Patient will increase functional independence with mobility by performin. Sit<>stand with SPV with RW.  2. Gait x 200ft feet with RW with SPV.  3. Ascend/descend 1 step(s) with least restrictive assistive device and SBA.    Outcome: Ongoing, Progressing   Pt presented supine in bed agreeable to PT.  Bed mobility supine>sit EOB with  SBA and instruction for techniques and sequencing. Static sitting EOB x 4 min w/o s/s of dizziness. Transfer sit>stand with min A, with instruction for hand placement. Gait x 12ft with RW, CGA Gait deviations of decreased stride length with step to gait pattern, decreased surgical hip flexion, and decreased surgical heel strike/toe off. Increased double limb support time. With verbal cues noted improvements in stride length, heel strike. [place] [facility] [place]GA instruction for hand placement.  Attempted there ex pt c/o 10/10 pain, deferred.

## 2020-05-07 NOTE — PLAN OF CARE
Problem: Physical Therapy Goal  Goal: Physical Therapy Goal  Description  Goals to be met by: 2020    Patient will increase functional independence with mobility by performin. Sit<>stand with SPV with RW.  2. Gait x 200ft feet with RW with SPV.  3. Ascend/descend 1 step(s) with least restrictive assistive device and SBA.    Outcome: Ongoing, Progressing   Pt sit to stand CGA with RW, amb'd 120' with RW and CGA/SBA, WBAT on LLE, step through gt pattern with cueing, pain level 7/10. HHPT

## 2020-05-07 NOTE — PLAN OF CARE
Patient in no distress,post-op incentive spirometry instructed with good effort.Will continue to monitor.

## 2020-05-08 VITALS
DIASTOLIC BLOOD PRESSURE: 78 MMHG | SYSTOLIC BLOOD PRESSURE: 120 MMHG | OXYGEN SATURATION: 95 % | HEIGHT: 72 IN | BODY MASS INDEX: 31.15 KG/M2 | RESPIRATION RATE: 18 BRPM | HEART RATE: 72 BPM | WEIGHT: 230 LBS | TEMPERATURE: 100 F

## 2020-05-08 PROBLEM — M16.9 OA (OSTEOARTHRITIS) OF HIP: Status: RESOLVED | Noted: 2020-05-07 | Resolved: 2020-05-08

## 2020-05-08 LAB
ANION GAP SERPL CALC-SCNC: 10 MMOL/L (ref 8–16)
BASOPHILS # BLD AUTO: 0.02 K/UL (ref 0–0.2)
BASOPHILS NFR BLD: 0.2 % (ref 0–1.9)
BUN SERPL-MCNC: 9 MG/DL (ref 8–23)
CALCIUM SERPL-MCNC: 9 MG/DL (ref 8.7–10.5)
CHLORIDE SERPL-SCNC: 100 MMOL/L (ref 95–110)
CO2 SERPL-SCNC: 27 MMOL/L (ref 23–29)
CREAT SERPL-MCNC: 0.8 MG/DL (ref 0.5–1.4)
DIFFERENTIAL METHOD: ABNORMAL
EOSINOPHIL # BLD AUTO: 0 K/UL (ref 0–0.5)
EOSINOPHIL NFR BLD: 0 % (ref 0–8)
ERYTHROCYTE [DISTWIDTH] IN BLOOD BY AUTOMATED COUNT: 12.1 % (ref 11.5–14.5)
EST. GFR  (AFRICAN AMERICAN): >60 ML/MIN/1.73 M^2
EST. GFR  (NON AFRICAN AMERICAN): >60 ML/MIN/1.73 M^2
GLUCOSE SERPL-MCNC: 120 MG/DL (ref 70–110)
HCT VFR BLD AUTO: 41.1 % (ref 40–54)
HGB BLD-MCNC: 13.6 G/DL (ref 14–18)
IMM GRANULOCYTES # BLD AUTO: 0.02 K/UL (ref 0–0.04)
IMM GRANULOCYTES NFR BLD AUTO: 0.2 % (ref 0–0.5)
LYMPHOCYTES # BLD AUTO: 1.4 K/UL (ref 1–4.8)
LYMPHOCYTES NFR BLD: 17 % (ref 18–48)
MCH RBC QN AUTO: 30.7 PG (ref 27–31)
MCHC RBC AUTO-ENTMCNC: 33.1 G/DL (ref 32–36)
MCV RBC AUTO: 93 FL (ref 82–98)
MONOCYTES # BLD AUTO: 1 K/UL (ref 0.3–1)
MONOCYTES NFR BLD: 12.5 % (ref 4–15)
NEUTROPHILS # BLD AUTO: 5.7 K/UL (ref 1.8–7.7)
NEUTROPHILS NFR BLD: 70.1 % (ref 38–73)
NRBC BLD-RTO: 0 /100 WBC
PLATELET # BLD AUTO: 97 K/UL (ref 150–350)
PMV BLD AUTO: 11.6 FL (ref 9.2–12.9)
POTASSIUM SERPL-SCNC: 3.8 MMOL/L (ref 3.5–5.1)
RBC # BLD AUTO: 4.43 M/UL (ref 4.6–6.2)
SODIUM SERPL-SCNC: 137 MMOL/L (ref 136–145)
WBC # BLD AUTO: 8.1 K/UL (ref 3.9–12.7)

## 2020-05-08 PROCEDURE — 97116 GAIT TRAINING THERAPY: CPT | Mod: CQ

## 2020-05-08 PROCEDURE — 94761 N-INVAS EAR/PLS OXIMETRY MLT: CPT

## 2020-05-08 PROCEDURE — 80048 BASIC METABOLIC PNL TOTAL CA: CPT

## 2020-05-08 PROCEDURE — 63600175 PHARM REV CODE 636 W HCPCS: Performed by: ORTHOPAEDIC SURGERY

## 2020-05-08 PROCEDURE — 97535 SELF CARE MNGMENT TRAINING: CPT

## 2020-05-08 PROCEDURE — 97110 THERAPEUTIC EXERCISES: CPT | Mod: CQ

## 2020-05-08 PROCEDURE — 36415 COLL VENOUS BLD VENIPUNCTURE: CPT

## 2020-05-08 PROCEDURE — 85025 COMPLETE CBC W/AUTO DIFF WBC: CPT

## 2020-05-08 PROCEDURE — 97165 OT EVAL LOW COMPLEX 30 MIN: CPT

## 2020-05-08 PROCEDURE — 63600175 PHARM REV CODE 636 W HCPCS: Performed by: INTERNAL MEDICINE

## 2020-05-08 PROCEDURE — 25000003 PHARM REV CODE 250: Performed by: ORTHOPAEDIC SURGERY

## 2020-05-08 PROCEDURE — 99900035 HC TECH TIME PER 15 MIN (STAT)

## 2020-05-08 RX ORDER — OXYCODONE AND ACETAMINOPHEN 10; 325 MG/1; MG/1
1 TABLET ORAL EVERY 6 HOURS PRN
Status: DISCONTINUED | OUTPATIENT
Start: 2020-05-08 | End: 2020-05-08 | Stop reason: HOSPADM

## 2020-05-08 RX ORDER — OXYCODONE AND ACETAMINOPHEN 10; 325 MG/1; MG/1
1 TABLET ORAL EVERY 6 HOURS PRN
Qty: 50 TABLET | Refills: 0 | Status: ON HOLD | OUTPATIENT
Start: 2020-05-08 | End: 2023-08-08 | Stop reason: HOSPADM

## 2020-05-08 RX ORDER — HYDROCODONE BITARTRATE AND ACETAMINOPHEN 10; 325 MG/1; MG/1
1 TABLET ORAL EVERY 4 HOURS PRN
Qty: 50 TABLET | Refills: 0 | Status: SHIPPED | OUTPATIENT
Start: 2020-05-08 | End: 2020-05-08 | Stop reason: HOSPADM

## 2020-05-08 RX ORDER — OXYCODONE AND ACETAMINOPHEN 10; 325 MG/1; MG/1
1 TABLET ORAL ONCE
Status: COMPLETED | OUTPATIENT
Start: 2020-05-08 | End: 2020-05-08

## 2020-05-08 RX ADMIN — HYDROCODONE BITARTRATE AND ACETAMINOPHEN 1 TABLET: 10; 325 TABLET ORAL at 05:05

## 2020-05-08 RX ADMIN — ONDANSETRON 8 MG: 8 TABLET, ORALLY DISINTEGRATING ORAL at 05:05

## 2020-05-08 RX ADMIN — POLYETHYLENE GLYCOL 3350 17 G: 17 POWDER, FOR SOLUTION ORAL at 09:05

## 2020-05-08 RX ADMIN — MORPHINE SULFATE 5 MG: 10 INJECTION INTRAVENOUS at 10:05

## 2020-05-08 RX ADMIN — FINASTERIDE 5 MG: 5 TABLET, FILM COATED ORAL at 08:05

## 2020-05-08 RX ADMIN — OXYCODONE AND ACETAMINOPHEN 1 TABLET: 10; 325 TABLET ORAL at 08:05

## 2020-05-08 RX ADMIN — ASPIRIN 81 MG: 81 TABLET, COATED ORAL at 08:05

## 2020-05-08 RX ADMIN — MORPHINE SULFATE 5 MG: 10 INJECTION INTRAVENOUS at 02:05

## 2020-05-08 RX ADMIN — FAMOTIDINE 20 MG: 20 TABLET ORAL at 08:05

## 2020-05-08 RX ADMIN — CEFAZOLIN SODIUM 2 G: 2 SOLUTION INTRAVENOUS at 06:05

## 2020-05-08 NOTE — PLAN OF CARE
Problem: Occupational Therapy Goal  Goal: Occupational Therapy Goal  Description  Goals to be met by: 5/12/20    Patient will increase functional independence with ADLs by performing:    Don underwear/pants at SBA level.  Don shoes at SBA level.  Don socks at SBA level.  Sponge bathing at SBA level adhering to left anterior SEAN precautions.   Sit to stand with RW for LB dressing consistently at SBA level using correct technique without cues.  ADL transfers at SBA level and without cueing to adhere to left anterior SEAN precautions.      Outcome: Ongoing, Progressing   Evaluation complete and goals established.  Pt is needing Min A grossly overall for self care tasks and ADL mobility using RW.  Pt educated and trained on left anterior SEAN precautions and how to adhere the precautions during ADLs/ADL mobility.  Pt needed Min A for sit to stand using RW and verbal and tactile cueing for adhering to left hip precautions and safe navigation of RW throughout tx session.  Pt educated on need to have assistance from a caregiver for the next week, need for Home Health therapy and need for BSC.  Pt agreeable to POC and discharge recommendations.   Mony Ortho RN notified of discharge recommendations and pt agreeing to BSC and Home Health therapy.

## 2020-05-08 NOTE — NURSING
Spoke with Dr. Cox about pain control issues and OK to discharge to home today . BSC delivered to BS. Pt refused home health services

## 2020-05-08 NOTE — PLAN OF CARE
AAOX4.VSS. Pt free of trauma, falls, injury and skin breakdown.  Pt pain moderately controlled at this time w/ Norco 10mg and IV Morphine.Pt has been eating  adequately throughout shift. Diaz to gravity; draining clear yellow urine.Pt bilateral lower extremities pulses intact. Dressing to L hip CDI.Fluids/IS encouraged throughout shift. Pt encouraged to reposition self frequently while in bed. PT this shift (see notes). Purposeful hourly rounding.Pt has call light in reach, side rails up X2, bed in low position, TEDs/SCDs and nonskid socks on. Pt lying in bed in no distress. Will continue to monitor.

## 2020-05-08 NOTE — NURSING
Discharge instructions reviewed with patient at length and verbalized 100% understanding. Extra pair knee high serafin hose sent home with pt.

## 2020-05-08 NOTE — PLAN OF CARE
Problem: Physical Therapy Goal  Goal: Physical Therapy Goal  Description  Goals to be met by: 2020    Patient will increase functional independence with mobility by performin. Sit<>stand with SPV with RW.  2. Gait x 200ft feet with RW with SPV.  3. Ascend/descend 1 step(s) with least restrictive assistive device and SBA.    Outcome: Ongoing, Progressing   Pt seated EOB with nurse Mony upon arrival. Sit<>stand with RW CGA for safety- Vcs required for hand placement & technique. Pt ambulated 150ft with RW & CGA- Vcs required for reciprocal gait & RW management. Practiced sit<>stand with RW 2x due to first attempt with decreased control on descend, Vcs required for technique. Pt performed quad sets, glut sets, AP, & LAQ x10 B LE- reviewed LE packet with pt. Pt able to recall 2/2 anterior hip precautions.

## 2020-05-08 NOTE — PLAN OF CARE
AAOx4.  NAD noted.  Pt remained free from injury or falls.  Pt remains free from skin breakdowns. Vitals signs stable throughout shift on RA.  Positions self with assistance x1.  Voiding clear yellow urine adequately throughout shift via Diaz catheter.  Pain managed with IV and PO medication.  Pt remained afebrile this shift.  Left hip incision, dressed with ABD pads and medipore tape.  Abductor pillow in place. Pt able to voice needs and concerns.  Purposeful rounding done.  All needs met.  Bed locked in lowest position, call bell in reach.  Will continue to monitor.

## 2020-05-08 NOTE — CONSULTS
REASON FOR CONSULTATION: Concurrent medical management     HPI:  Rough night.  Never got complete pain relief.  Wants to go home.  Sitting up in chair.    REVIEW OF SYSTEMS:    Constitution: Negative for chills and fever.   HENT: Negative for congestion and sore throat.    Eyes: Negative for blurred vision.   Cardiovascular: Negative for chest pain.   Respiratory: Negative for cough and shortness of breath.    Skin: Negative for rash.   Musculoskeletal: Negative for back pain and joint pain + hip pain  Gastrointestinal: Negative for abdominal pain, diarrhea, nausea and vomiting.   Neurological: Negative for headaches.   Psychiatric/Behavioral: The patient is nervous/anxious.      Past Medical History:   Diagnosis Date    BPH (benign prostatic hyperplasia)     Hair loss     Hyperlipidemia     Osteoarthritis        Past Surgical History:   Procedure Laterality Date    HIP REPLACEMENT ARTHROPLASTY Left 5/7/2020    Procedure: ARTHROPLASTY, HIP REPLACEMENT;  Surgeon: Celestine Cox MD;  Location: [facility];  Service: Orthopedics;  Laterality: Left;    KNEE ARTHROSCOPY      TONSILLECTOMY         Review of patient's allergies indicates:   Allergen Reactions    Ciprofloxacin Rash    Metronidazole Rash       Medications Prior to Admission   Medication Sig Dispense Refill Last Dose    ALPRAZolam (XANAX) 1 MG tablet TK 1 T PO QHS PRN  1 5/6/2020 at Unknown time    aspirin (ECOTRIN) 81 MG EC tablet Take 81 mg by mouth once daily.   5/6/2020 at Unknown time    atorvastatin (LIPITOR) 40 MG tablet Take 40 mg by mouth every evening.   5/6/2020 at Unknown time    finasteride (PROSCAR) 5 mg tablet TK 1/2 T PO QD  3 Past Week at Unknown time    fish oil-omega-3 fatty acids 300-1,000 mg capsule Take 2 g by mouth once daily.   Past Week at Unknown time       Current Facility-Administered Medications   Medication Dose Route Frequency Provider Last Rate Last Dose    ALPRAZolam tablet 1 mg  1 mg Oral BID PRN Celestine JOHNSTON  MD Brooke   1 mg at 05/07/20 1353    aspirin EC tablet 81 mg  81 mg Oral Daily Celestine Cox MD   81 mg at 05/08/20 0856    atorvastatin tablet 40 mg  40 mg Oral QHS Celestine Cox MD   40 mg at 05/07/20 2044    dextrose 5 % and 0.9 % NaCl infusion   Intravenous Continuous Celestine Cox MD 75 mL/hr at 05/07/20 1059      famotidine tablet 20 mg  20 mg Oral Daily Celestine Cox MD   20 mg at 05/08/20 0857    finasteride tablet 5 mg  5 mg Oral Daily Celestine Cox MD   5 mg at 05/08/20 0856    HYDROcodone-acetaminophen  mg per tablet 1 tablet  1 tablet Oral Q4H PRN Celestine Cox MD   1 tablet at 05/08/20 0520    melatonin tablet 9 mg  9 mg Oral Nightly PRN Celestine Cox MD        morphine injection 5 mg  5 mg Intravenous Q3H PRN Lima Pearce MD   5 mg at 05/08/20 0221    ondansetron disintegrating tablet 8 mg  8 mg Oral Q8H PRN Celestine Cox MD   8 mg at 05/08/20 0520    oxyCODONE-acetaminophen  mg per tablet 1 tablet  1 tablet Oral Q6H PRN Celestine Cox MD        polyethylene glycol packet 17 g  17 g Oral Daily Celestine Cox MD        promethazine (PHENERGAN) 6.25 mg in dextrose 5 % 50 mL IVPB  6.25 mg Intravenous Q6H PRN Celestine Cox  mL/hr at 05/07/20 1743 6.25 mg at 05/07/20 1743    sodium chloride 0.9% flush 3 mL  3 mL Intravenous PRN Hossein Landeros MD        sodium chloride 0.9% flush 5 mL  5 mL Intravenous PRN Celestine Cox MD           Social History     Socioeconomic History    Marital status: Single     Spouse name: Not on file    Number of children: Not on file    Years of education: Not on file    Highest education level: Not on file   Occupational History    Not on file   Social Needs    Financial resource strain: Not on file    Food insecurity:     Worry: Not on file     Inability: Not on file    Transportation needs:     Medical: Not on file     Non-medical: Not on file   Tobacco Use    Smoking status: Never  Smoker    Smokeless tobacco: Never Used   Substance and Sexual Activity    Alcohol use: Yes    Drug use: Not on file    Sexual activity: Not on file   Lifestyle    Physical activity:     Days per week: Not on file     Minutes per session: Not on file    Stress: Not on file   Relationships    Social connections:     Talks on phone: Not on file     Gets together: Not on file     Attends Orthodoxy service: Not on file     Active member of club or organization: Not on file     Attends meetings of clubs or organizations: Not on file     Relationship status: Not on file   Other Topics Concern    Not on file   Social History Narrative    Not on file       Family History   Adopted: Yes   Problem Relation Age of Onset    No Known Problems Mother     No Known Problems Father        Temp:  [97.6 °F (36.4 °C)-99.6 °F (37.6 °C)] 99.6 °F (37.6 °C)  Pulse:  [72-99] 72  Resp:  [16-20] 18  SpO2:  [94 %-98 %] 95 %  BP: (120-154)/(76-91) 120/78      PHYSICAL EXAM:    GEN:  Alert and Oriented x4, NAD, appropriate affect  HEENT:  NCAT, HARRIS, No conjunctivitis, normal sclera, O/P clear, no oropharyngial lesions, no thrush, neck supple, No LAD, Nml JVD, no carotid bruits  CV:  RRR no MRG  Lungs:  CTAb, no rhonchi, wheeze, crackles, normal excursion  Abdomen: Obese, soft, NTND, no fluid wave, no HSM, NABS  Ext:  No CCE, normal DP pulses bilat L hip wound CDI  Neuro:  Non-Focal, EOMI, gait not assessed  Skin:  No rash, excoriation, petchiae    Labs and radiology reviewed    ASSESSMENT AND PLAN:    POD #1 Left hip replacement   -Per ortho team  -No intraop complications noted  -ASA 81 mg  -Patient has not had adequate pain relief will adjust medication regimen  -Defer pain med regimen to ortho  -Discussed with nursing.    Dyslipidemia  -Continue statin per home meds    BPH  -Continue finasteride and monitor for urinary retention    ABLA  -Hgb 15.5 to 13.6  -No need for intervention

## 2020-05-08 NOTE — PT/OT/SLP EVAL
"Occupational Therapy   Evaluation and Treatment    Name: Mao Pearce  MRN: [id number]  Admitting Diagnosis:  OA (osteoarthritis) of hip 1 Day Post-Op    Recommendations:     Discharge Recommendations: home health PT, home health OT  Discharge Equipment Recommendations:  walker, rolling, 3-in-1 commode, shower chair  Barriers to discharge:  Decreased caregiver support    Assessment:     Mao Pearce is a 74 y.o. male with a medical diagnosis of OA (osteoarthritis) of hip.  He presents sitting up in bedside chair, stating he had "no idea it would hurt like this", referring to left hip.   Pt educated and trained on left SEAN precautions and how to adhere to precautions during ADLs and ADL mobility using RW.  Pt is grossly overall, Min A with self care tasks and ADL mobility with verbal and sometimes tactile cues to adhere to left anterior SEAN precautions.  Pt educated on use of BSC for safety and increased Indep with sit<>stand for toileting and pt agreed that he needs a BSC.  Pt is needing CGA<>Min A for sit<>stand currently. Pt reports he will take home the urinal he is using at the hospital and use it at night to decrease fall risk when needing to urinate at night for the first few days.  Pt stating his friend would be available to assist him for only a day but after OT educated pt on safety/fall risk and need to have both hands on RW for mobility, pt was able to see during tx session how much assistance he really does need as well as assist for IADLs.  Pt then stated he would let his friend know that he needs assist for several days and that it would not be a problem for his friend to stay longer at his home to assist him.  OT recommended that pt have Home Health OT and PT and educated pt on what therapy would and could offer in the home for pt's best and safest recovery.  At end of tx session, pt stating he wanted Home Health and asked OT to make sure that she communicated that he changed his mind and he " wanted Home Health therapy and wanted to confirm that someone would Home Health his cell phone number to get in touch with him.  Pt educated on recommendation for a shower chair once he is cleared to shower but pt stating he will make that decision later.  OT recommended pt use a long handle shoe horn to increase his Indep with donning slip on shoes and pt stating he has one at home that he will use.  Pt declined training in use of sock aid. RN's Mony and Levi informed that pt wanted Home Health therapy and BSC.  OT also secure chatted Dr Cox, charge nurse, CM, MSW and RNs of pt being in agreement and wanting Home Health OT and PT and a BSC.  Performance deficits affecting function: weakness, impaired endurance, impaired self care skills, impaired functional mobilty, gait instability, impaired balance, decreased lower extremity function, decreased ROM, orthopedic precautions, impaired skin, pain, decreased safety awareness.   Recommend discharge to home with assist, BSC, and Home Health OT and PT.     Rehab Prognosis: Good; patient would benefit from acute skilled OT services to address these deficits and reach maximum level of function.       Plan:     Patient to be seen daily to address the above listed problems via self-care/home management, therapeutic activities  · Plan of Care Expires: 06/07/20  · Plan of Care Reviewed with: patient    Subjective     Chief Complaint: Pt stating several times that he had no idea that he would hurt so badly.  Patient/Family Comments/goals: To return home and return to Indep level with all previous activities.     Occupational Profile:  Living Environment: Lives alone in a condo with no steps to enter, tub/shower, low toilet  Previous level of function:INdep but was using a single crutch for the last month due to left hip pain and unable to have elective surgery due to COVID 19 pandemic.  Roles and Routines: Works from home.   Equipment Used at Home:  (Pt was using a  "single crutch for the last month due to pain.  Pt stating he was unable to have surgery due to COVID 19 restrictions on elective surgery.)  Assistance upon Discharge: Friend to assist for the next few days as needed    Pain/Comfort:  · Pain Rating 1: 10/10("I had no idea it would hurt like this!")  · Location - Side 1: Left  · Location - Orientation 1: generalized  · Location 1: hip  · Pain Addressed 1: Pre-medicate for activity, Reposition, Distraction  · Pain Rating Post-Intervention 1: 6/10    Patients cultural, spiritual, Lutheran conflicts given the current situation: (None stated)    Objective:     Communicated with: nurse, Mony, prior to session.  Patient found up in chair with peripheral IV and surgical dressing to left hip upon OT entry to room.    General Precautions: Standard, fall   Orthopedic Precautions:LLE weight bearing as tolerated, LLE anterior precautions   Braces: N/A     Occupational Performance:      Functional Mobility/Transfers:  · Patient completed Sit <> Stand Transfer with minimum assistance  with  rolling walker   · Patient completed Bed <> Chair Transfer using Step Transfer technique with minimum assistance with rolling walker  · Functional Mobility: Pt needing verbal and tactile cues to adhere to left anterior SEAN precautions, correct/safe hand placement during sit<>stand and correct technique for turning with RW.      Activities of Daily Living:  · Grooming: stand by assistance and standing at sink with RW verbal cues for correct placement of RW while standing at the sink and cues for adhering to left anterior SEAN precautions when stepping backwards away from sink at end of task.  · Upper Body Dressing: Set up sitting EOB    · Lower Body Dressing: Min A for donning elastic waist shorts, Set up for donning right slip on shoe, Mod A for donning left slip on shoe, Unable to don left sock and not wanting training in use of adaptive equipment.  Pt reports he has a long handle shoe horn " at home for donning slip on shoes.     · Toileting: minimum assistance Needs a BSC for sit<>stand from toilet.  Pt is using urinal without assistance.     Cognitive/Visual Perceptual:  Cognitive/Psychosocial Skills:     -       Oriented to: Person, Place, Time and Situation   -       Follows Commands/attention:Follows one-step commands  -       Communication: clear/fluent  -       Memory: No Deficits noted  -       Safety awareness/insight to disability: impaired   -       Mood/Affect/Coping skills/emotional control: Cooperative and slightly anxious  Visual/Perceptual:   wears glasses for reading      Physical Exam:  Balance: CGA for ADL mobility using RW  Skin integrity: Surgical incision with dressing left anterior hip  Sensation:    Intact  Motor Planning: Intact  Upper Extremity Range of Motion:  WFL for self care tasks  Upper Extremity Strength: 5/5  Fine Motor Coordination: WFL for self care tasks  Gross motor coordination: WFL for self care tasks    AMPAC 6 Click ADL:  AMPAC Total Score: 16    Treatment & Education:  Role of OT, POC, left anterior SEAN precautions and how to adhere to precautions during ADLs/ADL mobility, safety during ADLs/ADL mobilit using RW, need for BSC, need for caregiver assist for several days, need for Home Health OT and PT and benefits, recommend shower chair when cleared to shower, use of long handle shoe horn for donning slip on shoes.  Education:    Patient left up in chair with all lines intact, call button in reach and nurseMony notified    GOALS:   Multidisciplinary Problems     Occupational Therapy Goals        Problem: Occupational Therapy Goal    Goal Priority Disciplines Outcome Interventions   Occupational Therapy Goal     OT, PT/OT Ongoing, Progressing    Description:  Goals to be met by: 5/12/20    Patient will increase functional independence with ADLs by performing:    Don underwear/pants at SBA level.  Don shoes at SBA level.  Don socks at SBA level.  Sponge  bathing at SBA level adhering to left anterior SEAN precautions.   Sit to stand with RW for LB dressing consistently at SBA level using correct technique without cues.  ADL transfers at SBA level and without cueing to adhere to left anterior SEAN precautions.                       History:     Past Medical History:   Diagnosis Date    BPH (benign prostatic hyperplasia)     Hair loss     Hyperlipidemia     Osteoarthritis        Past Surgical History:   Procedure Laterality Date    HIP REPLACEMENT ARTHROPLASTY Left 5/7/2020    Procedure: ARTHROPLASTY, HIP REPLACEMENT;  Surgeon: Celestine Cox MD;  Location: [facility];  Service: Orthopedics;  Laterality: Left;    KNEE ARTHROSCOPY      TONSILLECTOMY         Time Tracking:     OT Date of Treatment: 05/08/20  OT Start Time: 0955  OT Stop Time: 1035  OT Total Time (min): 40 min    Billable Minutes:Evaluation 20  Self Care/Home Management 20    DWAINE Hernandez  5/8/2020

## 2020-05-08 NOTE — PT/OT/SLP PROGRESS
Physical Therapy Treatment    Patient Name:  Mao Pearce   MRN:  [id number]    Recommendations:     Discharge Recommendations:  home health PT   Discharge Equipment Recommendations: walker, rolling, 3-in-1 commode, shower chair   Barriers to discharge: None pt reports having friend avil for a few days    Assessment:     Mao Pearce is a 74 y.o. male admitted with a medical diagnosis of OA (osteoarthritis) of hip.  He presents with the following impairments/functional limitations:  weakness, gait instability, impaired endurance, impaired balance, decreased lower extremity function, impaired muscle length, orthopedic precautions, impaired functional mobilty, impaired self care skills .    Pt seated EOB with nurse Mony upon arrival. Sit<>stand with RW CGA for safety- Vcs required for hand placement & technique. Pt ambulated 150ft with RW & CGA- Vcs required for reciprocal gait & RW management. Practiced sit<>stand with RW 2x due to first attempt with decreased control on descend, Vcs required for technique. Pt performed quad sets, glut sets, AP, & LAQ x10 B LE- reviewed LE packet with pt. Pt able to recall 2/2 anterior hip precautions.    Rehab Prognosis: Good; patient would benefit from acute skilled PT services to address these deficits and reach maximum level of function.    Recent Surgery: Procedure(s) (LRB):  ARTHROPLASTY, HIP REPLACEMENT (Left) 1 Day Post-Op    Plan:     During this hospitalization, patient to be seen BID to address the identified rehab impairments via gait training, therapeutic activities, therapeutic exercises and progress toward the following goals:    · Plan of Care Expires:  06/07/20    Subjective     Chief Complaint: L hip pain  Patient/Family Comments/goals: d/c home today  Pain/Comfort:  · Pain Rating 1: 10/10  · Location - Side 1: Left  · Location - Orientation 1: anterior  · Location 1: hip  · Pain Addressed 1: Pre-medicate for activity, Reposition, Distraction  · Pain  Rating Post-Intervention 1: 6/10      Objective:     Communicated with nurse Lewis prior to session.  Patient found seated EOB with nurse Mony with peripheral IV upon PT entry to room.     General Precautions: Standard, fall   Orthopedic Precautions:LLE weight bearing as tolerated, LLE anterior precautions   Braces:       Functional Mobility:  · Transfers-  Sit<>stand with RW CGA for safety- Vcs required for hand placement & technique. Practiced sit<>stand with RW 2x due to first attempt with decreased control on descend, Vcs required for technique.   Gait- Pt ambulated 150ft with RW & CGA- Vcs required for reciprocal gait & RW management.     AM-PAC 6 CLICK MOBILITY  Turning over in bed (including adjusting bedclothes, sheets and blankets)?: 3  Sitting down on and standing up from a chair with arms (e.g., wheelchair, bedside commode, etc.): 3  Moving from lying on back to sitting on the side of the bed?: 3  Moving to and from a bed to a chair (including a wheelchair)?: 3  Need to walk in hospital room?: 3  Climbing 3-5 steps with a railing?: 3  Basic Mobility Total Score: 18       Therapeutic Activities and Exercises:  · Pt performed quad sets, glut sets, AP, & LAQ x10 B LE- reviewed LE packet with pt. Pt able to recall 2/2 anterior hip precautions.      Patient left up in chair with all lines intact and call button in reach..    GOALS:   Multidisciplinary Problems     Physical Therapy Goals        Problem: Physical Therapy Goal    Goal Priority Disciplines Outcome Goal Variances Interventions   Physical Therapy Goal     PT, PT/OT Ongoing, Progressing     Description:  Goals to be met by: 2020    Patient will increase functional independence with mobility by performin. Sit<>stand with SPV with RW.  2. Gait x 200ft feet with RW with SPV.  3. Ascend/descend 1 step(s) with least restrictive assistive device and SBA.                     Time Tracking:     PT Received On: 20  PT Start Time: 0855      PT Stop Time: 0920  PT Total Time (min): 25 min     Billable Minutes: Gait Training 15 and Therapeutic Exercise 10    Treatment Type: Treatment  PT/PTA: PTA     PTA Visit Number: 2     Nelda Wilson PTA  05/08/2020

## 2020-05-08 NOTE — PLAN OF CARE
Patient with discharge home with self care. He has declined home health notified Mony,ortho liason of patient wishes. Mony was aware. RW and BSC delivered to room by AZUL Hopkins. No further needs at discharge.         05/08/20 1008   Final Note   Assessment Type Final Discharge Note   Anticipated Discharge Disposition Home   What phone number can be called within the next 1-3 days to see how you are doing after discharge?   ([phone])   Hospital Follow Up  Appt(s) scheduled? No   Discharge plans and expectations educations in teach back method with documentation complete? Yes

## 2020-05-08 NOTE — PLAN OF CARE
Patients would now like home health per YING Huntley. CM faxed referral to [facility]    CM called [facility] [phone] and they will review clinicals and call CM to notify if they can accept or not    Patient has been accepted by [facility] care per Tri    CM called patient to notify him of home health agency. Patient stated that he told therapy that he didn't want home health. He has someone at home to help sofya    CM sent a secure chat that included Mony, Yuko mercado OT, and Dr. Cox of the patient wishes which had already been expressed by patient upon admit and throughout hospital stay      EMA spoke with Tri at [facility] and notified her that the patient has declined home health. She stated agency will follow up with patient to see if he would like any assistance from home health

## 2020-05-08 NOTE — DISCHARGE SUMMARY
[facility]  Discharge Summary      Admit Date: 5/7/2020    Discharge Date and Time: No discharge date for patient encounter.    Attending Physician: Celestine Cox MD     Reason for Admission:  Osteoarthritis left hip    Procedures Performed: Procedure(s) (LRB):  ARTHROPLASTY, HIP REPLACEMENT (Left)    Hospital Course (synopsis of major diagnoses, care, treatment, and services provided during the course of the hospital stay): The above patient underwent the above procedure.  Post operative the patient received pain management and pt / ot. The patient progressed well and will be discharged--see orders.     Consults: nephrology    Significant Diagnostic Studies: Labs:   CBC   Recent Labs   Lab 05/08/20  0503   WBC 8.10   HGB 13.6*   HCT 41.1   PLT 97*       Final Diagnoses:    Principal Problem: OA (osteoarthritis) of hip   Secondary Diagnoses:   Active Hospital Problems   No active problems to display.      Resolved Hospital Problems    Diagnosis Date Resolved POA    *OA (osteoarthritis) of hip [M16.9] 05/08/2020 Yes    OA (osteoarthritis) of hip [M16.9] 05/07/2020 Yes       Discharged Condition: good    Disposition: [place] [facility]    Follow Up/Patient Instructions:     Medications:  Reconciled Home Medications:      Medication List      START taking these medications    HYDROcodone-acetaminophen  mg per tablet  Commonly known as:  NORCO  Take 1 tablet by mouth every 4 (four) hours as needed.        CONTINUE taking these medications    ALPRAZolam 1 MG tablet  Commonly known as:  XANAX  TK 1 T PO QHS PRN     aspirin 81 MG EC tablet  Commonly known as:  ECOTRIN  Take 81 mg by mouth once daily.     atorvastatin 40 MG tablet  Commonly known as:  LIPITOR  Take 40 mg by mouth every evening.     finasteride 5 mg tablet  Commonly known as:  PROSCAR  TK 1/2 T PO QD     fish oil-omega-3 fatty acids 300-1,000 mg capsule  Take 2 g by mouth once daily.          Discharge Procedure Orders  "  WALKER FOR HOME USE     Order Specific Question Answer Comments   Type of Walker: Adult (5'4"-6'6")    With wheels? Yes    Height: 6' (1.829 m)    Weight: 104.3 kg (230 lb)    Length of need (1-99 months): 99    Does patient have medical equipment at home? none    Please check all that apply: Patient's condition impairs ambulation.      COVID-19 Routine Screening   Standing Status: Future Standing Exp. Date: 06/22/21     Order Specific Question Answer Comments   Is the patient symptomatic? No      COVID-19 Routine Screening   Standing Status: Future Number of Occurrences: 1 Standing Exp. Date: 06/28/21     Order Specific Question Answer Comments   Is the patient symptomatic? No      Leave dressing on - Keep it clean, dry, and intact until clinic visit     Follow-up Information     Follow up In 2 weeks.    Why:  Call [phone] to reach Dr. Cox              Percocet 10  given instead of hydrocodone.  Due to pain tolerance.  "

## 2020-05-08 NOTE — PT/OT/SLP DISCHARGE
Occupational Therapy Discharge Summary    Mao Pearce  MRN: [id number]   Principal Problem: OA (osteoarthritis) of hip      Patient Discharged from acute Occupational Therapy on 5/8/20  Please refer to prior OT note dated 5/8/20 for functional status.    Assessment:      Goals partially met. Patient appropriate for care in another setting.    Objective:     GOALS:   Multidisciplinary Problems     Occupational Therapy Goals        Problem: Occupational Therapy Goal    Goal Priority Disciplines Outcome Interventions   Occupational Therapy Goal     OT, PT/OT Ongoing, Progressing    Description:  Goals to be met by: 5/12/20    Patient will increase functional independence with ADLs by performing:    Don underwear/pants at SBA level.  Don shoes at SBA level.  Don socks at SBA level.  Sponge bathing at SBA level adhering to left anterior SEAN precautions.   Sit to stand with RW for LB dressing consistently at SBA level using correct technique without cues.  ADL transfers at SBA level and without cueing to adhere to left anterior SEAN precautions.                       Reasons for Discontinuation of Therapy Services  Transfer to alternate level of care.      Plan:     Patient Discharged to: Home.   Home Health OT and PT Services and 24 hr assist recommended by OT.      DWAINE Hernandez  5/8/2020

## 2020-05-11 LAB
FINAL PATHOLOGIC DIAGNOSIS: NORMAL
GROSS: NORMAL

## 2020-05-20 ENCOUNTER — NURSE TRIAGE (OUTPATIENT)
Dept: ADMINISTRATIVE | Facility: CLINIC | Age: 75
End: 2020-05-20

## 2020-05-20 NOTE — TELEPHONE ENCOUNTER
Denies fever, sob, cough    Additional Information   Negative: Lab result questions   Negative: [1] Caller is not with the child AND [2] is reporting urgent symptoms   Negative: Medication or pharmacy questions   Negative: Caller is rude or angry   Negative: Caller cannot be reached by phone   Negative: Caller has already spoken to PCP or another triager   Negative: Requesting regular office appointment   Negative: [1] Caller requesting nonurgent health information AND [2] PCP's office is the best resource   Negative: RN needs further essential information from caller in order to complete triage   Negative: Health Information question, no triage required and triager able to answer question   Negative: [place] Information question, no triage required and triager able to answer question   Negative: Behavior or development information question, no triage required and triager able to answer question   Negative: General information question, no triage required and triager able to answer question   Negative: Question about upcoming scheduled test, no triage required and triager able to answer question   Negative: [1] Caller is not with the child AND [2] probable non-urgent symptoms AND [3] unable to complete triage  (NOTE: parent to call back with triage info)   Negative: [1] Follow-up call to recent contact AND [2] information only call, no triage required    Protocols used: INFORMATION ONLY CALL - NO TRIAGE-P-

## 2020-07-13 ENCOUNTER — OFFICE VISIT (OUTPATIENT)
Dept: URGENT CARE | Facility: CLINIC | Age: 75
End: 2020-07-13
Payer: MEDICARE

## 2020-07-13 VITALS
RESPIRATION RATE: 18 BRPM | HEART RATE: 74 BPM | OXYGEN SATURATION: 96 % | WEIGHT: 230 LBS | TEMPERATURE: 98 F | BODY MASS INDEX: 31.19 KG/M2 | DIASTOLIC BLOOD PRESSURE: 93 MMHG | SYSTOLIC BLOOD PRESSURE: 144 MMHG

## 2020-07-13 DIAGNOSIS — H61.23 BILATERAL IMPACTED CERUMEN: Primary | ICD-10-CM

## 2020-07-13 PROCEDURE — 99214 PR OFFICE/OUTPT VISIT, EST, LEVL IV, 30-39 MIN: ICD-10-PCS | Mod: S$GLB,,, | Performed by: NURSE PRACTITIONER

## 2020-07-13 PROCEDURE — 99214 OFFICE O/P EST MOD 30 MIN: CPT | Mod: S$GLB,,, | Performed by: NURSE PRACTITIONER

## 2020-07-13 NOTE — PATIENT INSTRUCTIONS
Please refrain from using q-tips or any foreign objects to clean ears. May try Debrox over the counter as needed for Ear Wax removal.    If you were prescribed a narcotic or controlled medication, do not drive or operate heavy equipment or machinery while taking these medications.  You must understand that you've received an Urgent Care treatment only and that you may be released before all your medical problems are known or treated. You, the patient, will arrange for follow up care as instructed.  Follow up with your PCP or specialty clinic as directed within 2-5 days if not improved or as needed.  You can call [phone] to schedule an appointment with the appropriate provider.  If your condition worsens we recommend that you receive another evaluation at the emergency room immediately or contact your primary medical clinics after hours call service to discuss your concerns.  Please return here or go to the Emergency Department for any concerns or worsening of condition.      Earwax Removal    The ear canal makes earwax from the canals lining. The ears make wax to lubricate and protect the ear canal. The ear canal is the tube that connects the middle ear to the outside of the ear. The wax protects the ear from bacteria, infection, and damage from water or trauma.  The wax that forms in the canal naturally moves toward the outside of the ear and falls out. In some cases, the ear may make too much wax. If the wax causes problems or keeps the healthcare provider from seeing into the ear, the extra wax may be removed.  Too much wax can affect your hearing. It can cause itching. In rare cases, it can be painful. Earwax should not be removed unless it is causing a problem. You should not stick objects into your ear to remove wax unless told to do so by your healthcare provider.  Healthcare providers can remove earwax safely. It is important to stay still during the procedure to avoid damage to the ear canal. But  removing earwax generally doesnt hurt. You will not usually need anesthesia or pain medicine when the provider removes the earwax.  A number of conditions lead to earwax buildup. These include some skin problems, a narrow ear canal, or ears that make too much earwax. Using cotton swabs in the canal pushes earwax deeper into the ear and contributes to the buildup of earwax.  Home care  · The healthcare provider may recommend mineral oil or an over-the-counter eardrop to use at home to soften the earwax. Use these products only if the provider recommends them. Use these products only if the provider recommends them. Carefully follow the instructions given.  · Dont use mineral oil or OTC eardrops if you might have an ear infection or a ruptured eardrum. Tell your healthcare provider right away if you have diabetes or an immune disorder.  · Dont use cotton swabs in your ears. Cotton swabs may push wax deeper into the ear canal or damage the eardrum. Use cotton gauze or a wet washcloth  to gently remove wax on the outside of the ear and around the opening to the ear canal.  · Don't use any probing device or object such as cotton-tipped swabs or gretta pins to clean the inside of your ears.  · Dont use ear candles to clean your ears. Candling can be dangerous. It can burn the ear canal. It can also make the condition worse instead of better.  · Dont use cold water to rinse the ear. This will make you dizzy. If your provider tells you to rinse your ear, use only warm water or follow his or her instructions.  · Check the ear for signs of infection or irritation listed below under When to seek medical advice.  Steps for using eardrops  1. Warm the medicine bottle by rubbing it between your hands for a few minutes.  2. Lie down on your side, with the affected ear up.  3. Place the recommended number of drops in the ear. Wet a cotton ball with the medicine. Gently put the cotton ball into the ear opening.  Follow-up  care  Follow up with your healthcare provider, or as directed.  When to seek medical advice  Call the provider right away if you have:  · Ear pain that gets worse  · Fever of 100.4F°F (38°C) or higher, or as directed by your healthcare provider  · Worsening wax buildup  · Severe pain, dizziness, or nausea  · Bleeding from the ear  · Hearing problems  · Signs of irritation from the eardrops, such as burning, stinging, or swelling and tenderness  · Foul-smelling fluid draining from the ear  · Swelling, redness, or tenderness of the outer ear  · Headache, neck pain, or stiff neck  Date Last Reviewed: 3/22/2015  © 9326-9324 [employer]. [address] [place] [address], [place] [address]. All rights reserved. This information is not intended as a substitute for professional medical care. Always follow your healthcare professional's instructions.

## 2020-07-13 NOTE — PROGRESS NOTES
Subjective:       Patient ID: Mao Pearce is a 74 y.o. male.    Vitals:  weight is 104.3 kg (230 lb). His temperature is 97.5 °F (36.4 °C). His blood pressure is 148/102 (abnormal) and his pulse is 74. His respiration is 18 and oxygen saturation is 96%.     Chief Complaint: Ear Fullness (both)    Ear Fullness   There is pain in both ears. This is a new problem. The current episode started in the past 7 days. The problem has been unchanged. There has been no fever. The pain is at a severity of 2/10. The pain is mild. Pertinent negatives include no coughing, rash, sore throat or vomiting. He has tried nothing for the symptoms. There is no history of hearing loss or a tympanostomy tube.       Constitution: Negative for chills, sweating, fatigue and fever.   HENT: Positive for ear pain. Negative for congestion, sinus pain, sinus pressure, sore throat and voice change.    Neck: Negative for painful lymph nodes.   Eyes: Negative for eye redness.   Respiratory: Negative for chest tightness, cough, sputum production, bloody sputum, COPD, shortness of breath, stridor, wheezing and asthma.    Gastrointestinal: Negative for nausea and vomiting.   Musculoskeletal: Negative for muscle ache.   Skin: Negative for rash.   Allergic/Immunologic: Negative for seasonal allergies and asthma.   Hematologic/Lymphatic: Negative for swollen lymph nodes.       Objective:      Physical Exam   Constitutional: He is oriented to person, place, and time. He appears well-developed. He is cooperative.  Non-toxic appearance. He does not appear ill. No distress.   HENT:   Head: Normocephalic and atraumatic.   Right Ear: Abnromal external ear normal. There is cerumen present. Decreased hearing is noted.   Left Ear: Abnormal external ear normal. There is cerumen present. Decreased hearing is noted.   Nose: Nose abnormal. No mucosal edema, rhinorrhea or nasal deformity. No epistaxis. Right sinus exhibits no maxillary sinus tenderness and no  frontal sinus tenderness. Left sinus exhibits no maxillary sinus tenderness and no frontal sinus tenderness.   Mouth/Throat: Uvula is midline, oropharynx is clear and moist and mucous membranes are normal. No trismus in the jaw. Normal dentition. No uvula swelling. No oropharyngeal exudate, posterior oropharyngeal edema or posterior oropharyngeal erythema. No tonsillar exudate.   Unable to visualized TM at this time.       Comments: Unable to visualized TM at this time.   Eyes: Pupils are equal, round, and reactive to light. Conjunctivae, EOM and lids are normal. No scleral icterus.   Neck: Trachea normal, full passive range of motion without pain and phonation normal. Neck supple. No neck rigidity. No edema and no erythema present.   Cardiovascular: Normal rate, regular rhythm, normal heart sounds and normal pulses.   Pulses:       Radial pulses are 2+ on the right side and 2+ on the left side.   Pulmonary/Chest: Effort normal and breath sounds normal. No respiratory distress. He has no decreased breath sounds. He has no rhonchi.   Musculoskeletal: Normal range of motion.         General: No deformity.   Neurological: He is alert and oriented to person, place, and time. He exhibits normal muscle tone. Coordination and gait normal.   Skin: Skin is warm, dry, intact, not diaphoretic and not pale. Capillary refill takes less than 2 seconds.   Psychiatric: His speech is normal and behavior is normal. Judgment and thought content normal.   Nursing note and vitals reviewed.        Assessment:       1. Bilateral impacted cerumen        Plan:     Post irrigation. Ear canal and TM noted with evidence of infection.     Bilateral impacted cerumen  -     Ear wax removal      Patient Instructions   Please refrain from using q-tips or any foreign objects to clean ears. May try Debrox over the counter as needed for Ear Wax removal.    If you were prescribed a narcotic or controlled medication, do not drive or operate heavy  equipment or machinery while taking these medications.  You must understand that you've received an Urgent Care treatment only and that you may be released before all your medical problems are known or treated. You, the patient, will arrange for follow up care as instructed.  Follow up with your PCP or specialty clinic as directed within 2-5 days if not improved or as needed.  You can call [phone] to schedule an appointment with the appropriate provider.  If your condition worsens we recommend that you receive another evaluation at the emergency room immediately or contact your primary medical clinics after hours call service to discuss your concerns.  Please return here or go to the Emergency Department for any concerns or worsening of condition.      Earwax Removal    The ear canal makes earwax from the canals lining. The ears make wax to lubricate and protect the ear canal. The ear canal is the tube that connects the middle ear to the outside of the ear. The wax protects the ear from bacteria, infection, and damage from water or trauma.  The wax that forms in the canal naturally moves toward the outside of the ear and falls out. In some cases, the ear may make too much wax. If the wax causes problems or keeps the healthcare provider from seeing into the ear, the extra wax may be removed.  Too much wax can affect your hearing. It can cause itching. In rare cases, it can be painful. Earwax should not be removed unless it is causing a problem. You should not stick objects into your ear to remove wax unless told to do so by your healthcare provider.  Healthcare providers can remove earwax safely. It is important to stay still during the procedure to avoid damage to the ear canal. But removing earwax generally doesnt hurt. You will not usually need anesthesia or pain medicine when the provider removes the earwax.  A number of conditions lead to earwax buildup. These include some skin problems, a narrow ear  canal, or ears that make too much earwax. Using cotton swabs in the canal pushes earwax deeper into the ear and contributes to the buildup of earwax.  Home care  · The healthcare provider may recommend mineral oil or an over-the-counter eardrop to use at home to soften the earwax. Use these products only if the provider recommends them. Use these products only if the provider recommends them. Carefully follow the instructions given.  · Dont use mineral oil or OTC eardrops if you might have an ear infection or a ruptured eardrum. Tell your healthcare provider right away if you have diabetes or an immune disorder.  · Dont use cotton swabs in your ears. Cotton swabs may push wax deeper into the ear canal or damage the eardrum. Use cotton gauze or a wet washcloth  to gently remove wax on the outside of the ear and around the opening to the ear canal.  · Don't use any probing device or object such as cotton-tipped swabs or gretta pins to clean the inside of your ears.  · Dont use ear candles to clean your ears. Candling can be dangerous. It can burn the ear canal. It can also make the condition worse instead of better.  · Dont use cold water to rinse the ear. This will make you dizzy. If your provider tells you to rinse your ear, use only warm water or follow his or her instructions.  · Check the ear for signs of infection or irritation listed below under When to seek medical advice.  Steps for using eardrops  1. Warm the medicine bottle by rubbing it between your hands for a few minutes.  2. Lie down on your side, with the affected ear up.  3. Place the recommended number of drops in the ear. Wet a cotton ball with the medicine. Gently put the cotton ball into the ear opening.  Follow-up care  Follow up with your healthcare provider, or as directed.  When to seek medical advice  Call the provider right away if you have:  · Ear pain that gets worse  · Fever of 100.4F°F (38°C) or higher, or as directed by your  healthcare provider  · Worsening wax buildup  · Severe pain, dizziness, or nausea  · Bleeding from the ear  · Hearing problems  · Signs of irritation from the eardrops, such as burning, stinging, or swelling and tenderness  · Foul-smelling fluid draining from the ear  · Swelling, redness, or tenderness of the outer ear  · Headache, neck pain, or stiff neck  Date Last Reviewed: 3/22/2015  © 7415-8338 [employer]. [address] [place] [address], [place] [address]. All rights reserved. This information is not intended as a substitute for professional medical care. Always follow your healthcare professional's instructions.

## 2020-07-31 ENCOUNTER — CLINICAL SUPPORT (OUTPATIENT)
Dept: URGENT CARE | Facility: CLINIC | Age: 75
End: 2020-07-31
Payer: MEDICARE

## 2020-07-31 VITALS — OXYGEN SATURATION: 98 % | HEART RATE: 79 BPM | TEMPERATURE: 98 F

## 2020-07-31 DIAGNOSIS — Z20.822 EXPOSURE TO COVID-19 VIRUS: Primary | ICD-10-CM

## 2020-07-31 LAB — SARS-COV-2 IGG SERPLBLD QL IA.RAPID: NEGATIVE

## 2020-07-31 PROCEDURE — 86769 SARS-COV-2 COVID-19 ANTIBODY: CPT

## 2020-07-31 NOTE — PROGRESS NOTES
NOTE for patient presenting for COVID test with no symptoms  Here for antibody covid testing done by philippe flood

## 2020-08-01 ENCOUNTER — TELEPHONE (OUTPATIENT)
Dept: URGENT CARE | Facility: CLINIC | Age: 75
End: 2020-08-01

## 2020-09-15 ENCOUNTER — OFFICE VISIT (OUTPATIENT)
Dept: URGENT CARE | Facility: CLINIC | Age: 75
End: 2020-09-15
Payer: MEDICARE

## 2020-09-15 VITALS
HEART RATE: 71 BPM | BODY MASS INDEX: 31.15 KG/M2 | OXYGEN SATURATION: 96 % | HEIGHT: 72 IN | SYSTOLIC BLOOD PRESSURE: 138 MMHG | DIASTOLIC BLOOD PRESSURE: 86 MMHG | WEIGHT: 230 LBS | TEMPERATURE: 98 F | RESPIRATION RATE: 18 BRPM

## 2020-09-15 DIAGNOSIS — J01.00 ACUTE NON-RECURRENT MAXILLARY SINUSITIS: Primary | ICD-10-CM

## 2020-09-15 LAB
CTP QC/QA: YES
SARS-COV-2 RDRP RESP QL NAA+PROBE: NEGATIVE

## 2020-09-15 PROCEDURE — 99214 OFFICE O/P EST MOD 30 MIN: CPT | Mod: S$GLB,,, | Performed by: FAMILY MEDICINE

## 2020-09-15 PROCEDURE — U0002 COVID-19 LAB TEST NON-CDC: HCPCS | Mod: S$GLB,,, | Performed by: FAMILY MEDICINE

## 2020-09-15 PROCEDURE — 99214 PR OFFICE/OUTPT VISIT, EST, LEVL IV, 30-39 MIN: ICD-10-PCS | Mod: S$GLB,,, | Performed by: FAMILY MEDICINE

## 2020-09-15 PROCEDURE — U0002: ICD-10-PCS | Mod: S$GLB,,, | Performed by: FAMILY MEDICINE

## 2020-09-15 NOTE — PROGRESS NOTES
Subjective:       Patient ID: Mao Pearce is a 75 y.o. male.    Vitals:  height is 6' (1.829 m) and weight is 104.3 kg (230 lb). His temperature is 97.8 °F (36.6 °C). His blood pressure is 138/86 and his pulse is 71. His respiration is 18 and oxygen saturation is 96%.     Chief Complaint: Headache and Flu Vaccine    Woke up this morning with sinus congestion, headache wanted covid test, no exposure. No cp, sob, fever.    Headache   This is a new problem. The current episode started today. The pain is located in the frontal region. The pain quality is similar to prior headaches. The pain is at a severity of 5/10. The pain is mild. Associated symptoms include sinus pressure and a sore throat. Pertinent negatives include no coughing, ear pain, eye redness, fever, muscle aches, nausea or vomiting. Nothing aggravates the symptoms. He has tried nothing for the symptoms.       Constitution: Negative for chills, sweating, fatigue and fever.   HENT: Positive for sinus pressure and sore throat. Negative for ear pain, congestion, sinus pain and voice change.    Neck: Negative for painful lymph nodes.   Eyes: Negative for eye redness.   Respiratory: Negative for chest tightness, cough, sputum production, bloody sputum, COPD, shortness of breath, stridor, wheezing and asthma.    Gastrointestinal: Negative for nausea and vomiting.   Musculoskeletal: Negative for muscle ache.   Skin: Negative for rash.   Allergic/Immunologic: Negative for seasonal allergies and asthma.   Neurological: Positive for headaches.   Hematologic/Lymphatic: Negative for swollen lymph nodes.       Objective:      Physical Exam   Constitutional: He is oriented to person, place, and time. He appears well-developed. He is cooperative.  Non-toxic appearance. He does not appear ill. No distress.   HENT:   Head: Normocephalic and atraumatic.   Ears:   Right Ear: Hearing, tympanic membrane, external ear and ear canal normal. Tympanic membrane is not  erythematous. No middle ear effusion.   Left Ear: Hearing, tympanic membrane, external ear and ear canal normal. Tympanic membrane is not erythematous.  No middle ear effusion.   Nose: Right sinus exhibits no maxillary sinus tenderness and no frontal sinus tenderness. Left sinus exhibits no maxillary sinus tenderness and no frontal sinus tenderness.   Pts Mask was not removed to decrease any transmission of viruses, pt  does not have a muffled voice        Comments: Pts Mask was not removed to decrease any transmission of viruses, pt  does not have a muffled voice    Eyes: Conjunctivae and lids are normal. No scleral icterus.   Neck: Trachea normal, full passive range of motion without pain and phonation normal. Neck supple. No neck rigidity. No edema and no erythema present.   Cardiovascular: Normal rate, regular rhythm, normal heart sounds and normal pulses.   Pulmonary/Chest: Effort normal and breath sounds normal. No accessory muscle usage. No tachypnea. No respiratory distress. He has no decreased breath sounds. He has no wheezes. He has no rhonchi. He has no rales.   NAD able to speak in clear complete sentences without difficulty      Comments: NAD able to speak in clear complete sentences without difficulty      Abdominal: Normal appearance.   Musculoskeletal: Normal range of motion.         General: No deformity.   Neurological: He is alert and oriented to person, place, and time. He exhibits normal muscle tone. Coordination normal.   Skin: Skin is warm, dry, intact, not diaphoretic and not pale. Psychiatric: His speech is normal and behavior is normal. Judgment and thought content normal.   Nursing note and vitals reviewed.        Results for orders placed or performed in visit on 09/15/20   POCT COVID-19 Rapid Screening   Result Value Ref Range    POC Rapid COVID Negative Negative     Acceptable Yes        Assessment:       1. Acute non-recurrent maxillary sinusitis        Plan:          Acute non-recurrent maxillary sinusitis  -     POCT COVID-19 Rapid Screening    pt just wanted covid testing, wanted to leave without AVS  Discussed sx tx and f/u precautions

## 2020-10-20 ENCOUNTER — OFFICE VISIT (OUTPATIENT)
Dept: URGENT CARE | Facility: CLINIC | Age: 75
End: 2020-10-20
Payer: COMMERCIAL

## 2020-10-20 VITALS
RESPIRATION RATE: 18 BRPM | SYSTOLIC BLOOD PRESSURE: 115 MMHG | DIASTOLIC BLOOD PRESSURE: 69 MMHG | WEIGHT: 230 LBS | HEART RATE: 73 BPM | OXYGEN SATURATION: 95 % | HEIGHT: 72 IN | BODY MASS INDEX: 31.15 KG/M2 | TEMPERATURE: 98 F

## 2020-10-20 DIAGNOSIS — Z20.822 CLOSE EXPOSURE TO COVID-19 VIRUS: Primary | ICD-10-CM

## 2020-10-20 LAB
CTP QC/QA: YES
SARS-COV-2 RDRP RESP QL NAA+PROBE: NEGATIVE

## 2020-10-20 PROCEDURE — U0002: ICD-10-PCS | Mod: QW,S$GLB,, | Performed by: NURSE PRACTITIONER

## 2020-10-20 PROCEDURE — 99213 PR OFFICE/OUTPT VISIT, EST, LEVL III, 20-29 MIN: ICD-10-PCS | Mod: S$GLB,,, | Performed by: NURSE PRACTITIONER

## 2020-10-20 PROCEDURE — 99213 OFFICE O/P EST LOW 20 MIN: CPT | Mod: S$GLB,,, | Performed by: NURSE PRACTITIONER

## 2020-10-20 PROCEDURE — U0002 COVID-19 LAB TEST NON-CDC: HCPCS | Mod: QW,S$GLB,, | Performed by: NURSE PRACTITIONER

## 2020-10-20 NOTE — PROGRESS NOTES
Subjective:       Patient ID: Mao Pearce is a 75 y.o. male.    Vitals:  height is 6' (1.829 m) and weight is 104.3 kg (230 lb). His temperature is 97.8 °F (36.6 °C). His blood pressure is 115/69 and his pulse is 73. His respiration is 18 and oxygen saturation is 95%.     Chief Complaint: COVID-19 Concerns    Other  This is a new problem. The current episode started today. Pertinent negatives include no arthralgias, chest pain, chills, congestion, coughing, diaphoresis, fatigue, fever, headaches, joint swelling, myalgias, nausea, rash, sore throat, swollen glands, urinary symptoms, vertigo or vomiting. Nothing aggravates the symptoms.       Constitution: Negative for chills, sweating, fatigue and fever.   HENT: Negative for ear pain, congestion, sinus pain, sinus pressure, sore throat and voice change.    Neck: Negative for painful lymph nodes.   Cardiovascular: Negative for chest pain and leg swelling.   Eyes: Negative for eye redness, double vision and blurred vision.   Respiratory: Negative for chest tightness, cough, sputum production, bloody sputum, COPD, shortness of breath, stridor, wheezing and asthma.    Gastrointestinal: Negative for nausea, vomiting and diarrhea.   Genitourinary: Negative for dysuria, frequency and urgency.   Musculoskeletal: Negative for joint pain, joint swelling, muscle cramps and muscle ache.   Skin: Negative for color change, pale and rash.   Allergic/Immunologic: Negative for seasonal allergies and asthma.   Neurological: Negative for dizziness, history of vertigo, light-headedness, passing out and headaches.   Hematologic/Lymphatic: Negative for swollen lymph nodes, easy bruising/bleeding and history of blood clots. Does not bruise/bleed easily.   Psychiatric/Behavioral: Negative for nervous/anxious, sleep disturbance and depression. The patient is not nervous/anxious.        Objective:      Physical Exam   Constitutional: He is oriented to person, place, and time. He  appears well-developed. He is cooperative.  Non-toxic appearance. He does not appear ill. No distress.   HENT:   Head: Normocephalic and atraumatic.   Ears:   Right Ear: Hearing, tympanic membrane, external ear and ear canal normal.   Left Ear: Hearing, tympanic membrane, external ear and ear canal normal.   Nose: Nose normal. No mucosal edema, rhinorrhea or nasal deformity. No epistaxis. Right sinus exhibits no maxillary sinus tenderness and no frontal sinus tenderness. Left sinus exhibits no maxillary sinus tenderness and no frontal sinus tenderness.   Mouth/Throat: Uvula is midline, oropharynx is clear and moist and mucous membranes are normal. No trismus in the jaw. Normal dentition. No uvula swelling. No posterior oropharyngeal erythema.   Eyes: Conjunctivae and lids are normal. Right eye exhibits no discharge. Left eye exhibits no discharge. No scleral icterus.   Neck: Trachea normal, normal range of motion, full passive range of motion without pain and phonation normal. Neck supple.   Cardiovascular: Normal rate, regular rhythm, normal heart sounds and normal pulses.   Pulmonary/Chest: Effort normal and breath sounds normal. No respiratory distress.   Abdominal: Soft. Normal appearance and bowel sounds are normal. He exhibits no distension, no pulsatile midline mass and no mass. There is no abdominal tenderness.   Musculoskeletal: Normal range of motion.         General: No deformity.   Neurological: He is alert and oriented to person, place, and time. He exhibits normal muscle tone. Coordination normal.   Skin: Skin is warm, dry, intact, not diaphoretic and not pale. Psychiatric: His speech is normal and behavior is normal. Judgment and thought content normal.   Nursing note and vitals reviewed.        Assessment:       1. Close exposure to COVID-19 virus        Plan:         Close exposure to COVID-19 virus  -     POCT COVID-19 Rapid Screening

## 2020-10-20 NOTE — PATIENT INSTRUCTIONS
Urgent Care Management:  - Treatment plan discussed.  - PCP recommendations given.  - Return precautions advised.  - Patient agrees with and understands plan of care.    Patient Instructions, Education, Teaching and Summary of Visit:      RETURN TO CLINIC IF SYMPTOMS WORSEN OR CALL 911 IMMEDIATELY FOR SHORTNESS OF BREATH, CHEST PAIN, DIZZINESS, WORSENING PAIN, NAUSEA AND VOMITING, HEART PALPITATIONS, FEVER AND/OR NECK STIFFNESS. FOLLOW UP WITH PRIMARY CARE PROVIDER IN THE AM.    -Diagnosis and treatment plan discussed with patient.  -Patient agreed with my treatment plan.  -Patient will follow up with primary care provider or Specialty Provider, as discussed.     -If you were prescribed a narcotic or controlled medication, do not drive or operate heavy equipment or machinery while taking these medications.  -You must understand that you've received an Urgent Care treatment only and that you may be released before all your medical problems are known or treated.   -You, the patient, will arrange for follow up care as instructed.  -Follow up with your PCP or specialty clinic as directed in the next 1-2 weeks if not improved or as needed.    -You can call [phone] to schedule an appointment with the appropriate provider.  -If your condition worsens we recommend that you receive another evaluation at the emergency room immediately or contact your primary medical clinics after hours call service to discuss your concerns.  -Please return here or go to the Emergency Department for any concerns or worsening of condition.    Please arrange follow up with your primary medical clinic as soon as possible. You must understand that you've received an Urgent Care treatment only and that you may be released before all of your medical problems are known or treated. You, the patient, will arrange for follow up as instructed. If your symptoms worsen or fail to improve you should go to the Emergency Room.    WE CANNOT RULE OUT  ALL POSSIBLE CAUSES OF YOUR SYMPTOMS IN THE URGENT CARE SETTING PLEASE GO TO THE ER IF YOU FEELS YOUR CONDITION IS WORSENING OR YOU WOULD LIKE EMERGENT EVALUATION.     Please return here or go to the Emergency Department for any concerns or worsening of condition.  If you were prescribed antibiotics, please take them to completion.  If you were prescribed a narcotic medication, do not drive or operate heavy equipment or machinery while taking these medications.  Please follow up with your primary care doctor or specialist as needed.     If you  smoke, please stop smoking.Guidelines for General Prevention of COVID-19    o Take steps to protect yourself from COVID-19. Perform hand hygiene frequently. Wash your hands often with soap and water for at least 20 seconds of use and alcohol-based hand , covering all surfaces of your hands and rubbing them together until they feel dry.  o Avoid touching your eyes, nose, and mouth with unwashed hands.  o Avoid close contact with people and stay home if youre sick, except to get medical care.   o Cover coughs and sneezes with a tissue, or use the inside of your elbow. Immediately wash your hands or use hand .     For more information, see CDC link below:    https://www.cdc.gov/coronavirus/2019-ncov/hcp/guidance-prevent-spread.html#precautions

## 2021-01-09 ENCOUNTER — IMMUNIZATION (OUTPATIENT)
Dept: INTERNAL MEDICINE | Facility: CLINIC | Age: 76
End: 2021-01-09
Payer: COMMERCIAL

## 2021-01-09 DIAGNOSIS — Z23 NEED FOR VACCINATION: ICD-10-CM

## 2021-01-09 PROCEDURE — 91300 COVID-19, MRNA, LNP-S, PF, 30 MCG/0.3 ML DOSE VACCINE: CPT | Mod: PBBFAC | Performed by: FAMILY MEDICINE

## 2021-01-30 ENCOUNTER — IMMUNIZATION (OUTPATIENT)
Dept: INTERNAL MEDICINE | Facility: CLINIC | Age: 76
End: 2021-01-30
Payer: COMMERCIAL

## 2021-01-30 DIAGNOSIS — Z23 NEED FOR VACCINATION: Primary | ICD-10-CM

## 2021-01-30 PROCEDURE — 91300 COVID-19, MRNA, LNP-S, PF, 30 MCG/0.3 ML DOSE VACCINE: CPT | Mod: PBBFAC | Performed by: FAMILY MEDICINE

## 2021-01-30 PROCEDURE — 0002A COVID-19, MRNA, LNP-S, PF, 30 MCG/0.3 ML DOSE VACCINE: CPT | Mod: PBBFAC | Performed by: FAMILY MEDICINE

## 2021-04-27 ENCOUNTER — OFFICE VISIT (OUTPATIENT)
Dept: URGENT CARE | Facility: CLINIC | Age: 76
End: 2021-04-27
Payer: COMMERCIAL

## 2021-04-27 VITALS
SYSTOLIC BLOOD PRESSURE: 123 MMHG | BODY MASS INDEX: 31.15 KG/M2 | OXYGEN SATURATION: 95 % | WEIGHT: 230 LBS | TEMPERATURE: 98 F | HEIGHT: 72 IN | RESPIRATION RATE: 16 BRPM | HEART RATE: 74 BPM | DIASTOLIC BLOOD PRESSURE: 80 MMHG

## 2021-04-27 DIAGNOSIS — H61.23 BILATERAL IMPACTED CERUMEN: Primary | ICD-10-CM

## 2021-04-27 PROCEDURE — 99214 PR OFFICE/OUTPT VISIT, EST, LEVL IV, 30-39 MIN: ICD-10-PCS | Mod: 25,S$GLB,, | Performed by: FAMILY MEDICINE

## 2021-04-27 PROCEDURE — 99214 OFFICE O/P EST MOD 30 MIN: CPT | Mod: 25,S$GLB,, | Performed by: FAMILY MEDICINE

## 2021-04-27 PROCEDURE — 3008F PR BODY MASS INDEX (BMI) DOCUMENTED: ICD-10-PCS | Mod: CPTII,S$GLB,, | Performed by: FAMILY MEDICINE

## 2021-04-27 PROCEDURE — 3008F BODY MASS INDEX DOCD: CPT | Mod: CPTII,S$GLB,, | Performed by: FAMILY MEDICINE

## 2021-04-27 PROCEDURE — 69209 EAR CERUMEN REMOVAL: ICD-10-PCS | Mod: 50,S$GLB,, | Performed by: FAMILY MEDICINE

## 2021-04-27 PROCEDURE — 69209 REMOVE IMPACTED EAR WAX UNI: CPT | Mod: 50,S$GLB,, | Performed by: FAMILY MEDICINE

## 2021-12-29 ENCOUNTER — NURSE TRIAGE (OUTPATIENT)
Dept: ADMINISTRATIVE | Facility: CLINIC | Age: 76
End: 2021-12-29
Payer: MEDICARE

## 2023-04-19 ENCOUNTER — HOSPITAL ENCOUNTER (OUTPATIENT)
Dept: PREADMISSION TESTING | Facility: OTHER | Age: 78
Discharge: HOME | End: 2023-04-19
Attending: ORTHOPAEDIC SURGERY
Payer: MEDICARE

## 2023-04-19 VITALS
WEIGHT: 226 LBS | DIASTOLIC BLOOD PRESSURE: 79 MMHG | HEIGHT: 72 IN | SYSTOLIC BLOOD PRESSURE: 126 MMHG | HEART RATE: 60 BPM | TEMPERATURE: 97 F | RESPIRATION RATE: 18 BRPM | BODY MASS INDEX: 30.61 KG/M2 | OXYGEN SATURATION: 98 %

## 2023-04-19 LAB
ANION GAP SERPL CALC-SCNC: 7 MMOL/L (ref 8–16)
BASOPHILS # BLD AUTO: 0.01 K/UL (ref 0–0.2)
BASOPHILS NFR BLD: 0.2 % (ref 0–1.9)
BILIRUB UR QL STRIP: NEGATIVE
BUN SERPL-MCNC: 14 MG/DL (ref 8–23)
CALCIUM SERPL-MCNC: 9.3 MG/DL (ref 8.7–10.5)
CHLORIDE SERPL-SCNC: 104 MMOL/L (ref 95–110)
CLARITY UR: CLEAR
CO2 SERPL-SCNC: 28 MMOL/L (ref 23–29)
COLOR UR: YELLOW
CREAT SERPL-MCNC: 0.8 MG/DL (ref 0.5–1.4)
DIFFERENTIAL METHOD: ABNORMAL
EOSINOPHIL # BLD AUTO: 0.1 K/UL (ref 0–0.5)
EOSINOPHIL NFR BLD: 2.2 % (ref 0–8)
ERYTHROCYTE [DISTWIDTH] IN BLOOD BY AUTOMATED COUNT: 12.3 % (ref 11.5–14.5)
EST. GFR  (NO RACE VARIABLE): >60 ML/MIN/1.73 M^2
GLUCOSE SERPL-MCNC: 111 MG/DL (ref 70–110)
GLUCOSE UR QL STRIP: NEGATIVE
HCT VFR BLD AUTO: 46.4 % (ref 40–54)
HGB BLD-MCNC: 15.8 G/DL (ref 14–18)
HGB UR QL STRIP: NEGATIVE
IMM GRANULOCYTES # BLD AUTO: 0.01 K/UL (ref 0–0.04)
IMM GRANULOCYTES NFR BLD AUTO: 0.2 % (ref 0–0.5)
KETONES UR QL STRIP: NEGATIVE
LEUKOCYTE ESTERASE UR QL STRIP: NEGATIVE
LYMPHOCYTES # BLD AUTO: 1.6 K/UL (ref 1–4.8)
LYMPHOCYTES NFR BLD: 29.2 % (ref 18–48)
MCH RBC QN AUTO: 31.7 PG (ref 27–31)
MCHC RBC AUTO-ENTMCNC: 34.1 G/DL (ref 32–36)
MCV RBC AUTO: 93 FL (ref 82–98)
MONOCYTES # BLD AUTO: 0.4 K/UL (ref 0.3–1)
MONOCYTES NFR BLD: 7.9 % (ref 4–15)
NEUTROPHILS # BLD AUTO: 3.4 K/UL (ref 1.8–7.7)
NEUTROPHILS NFR BLD: 60.3 % (ref 38–73)
NITRITE UR QL STRIP: NEGATIVE
NRBC BLD-RTO: 0 /100 WBC
PH UR STRIP: 6 [PH] (ref 5–8)
PLATELET # BLD AUTO: 91 K/UL (ref 150–450)
PMV BLD AUTO: 11.1 FL (ref 9.2–12.9)
POTASSIUM SERPL-SCNC: 4.1 MMOL/L (ref 3.5–5.1)
PROT UR QL STRIP: NEGATIVE
RBC # BLD AUTO: 4.98 M/UL (ref 4.6–6.2)
SODIUM SERPL-SCNC: 139 MMOL/L (ref 136–145)
SP GR UR STRIP: 1.02 (ref 1–1.03)
URN SPEC COLLECT METH UR: NORMAL
UROBILINOGEN UR STRIP-ACNC: NEGATIVE EU/DL
WBC # BLD AUTO: 5.58 K/UL (ref 3.9–12.7)

## 2023-04-19 PROCEDURE — 81003 URINALYSIS AUTO W/O SCOPE: CPT | Performed by: ORTHOPAEDIC SURGERY

## 2023-04-19 PROCEDURE — 36415 COLL VENOUS BLD VENIPUNCTURE: CPT | Performed by: ORTHOPAEDIC SURGERY

## 2023-04-19 PROCEDURE — 80048 BASIC METABOLIC PNL TOTAL CA: CPT | Performed by: ORTHOPAEDIC SURGERY

## 2023-04-19 PROCEDURE — 85025 COMPLETE CBC W/AUTO DIFF WBC: CPT | Performed by: ORTHOPAEDIC SURGERY

## 2023-04-19 RX ORDER — ACETAMINOPHEN 500 MG
1000 TABLET ORAL
Status: CANCELLED | OUTPATIENT
Start: 2023-04-19 | End: 2023-04-19

## 2023-04-19 RX ORDER — LIDOCAINE HYDROCHLORIDE 10 MG/ML
0.5 INJECTION, SOLUTION EPIDURAL; INFILTRATION; INTRACAUDAL; PERINEURAL ONCE
Status: CANCELLED | OUTPATIENT
Start: 2023-04-19 | End: 2023-04-19

## 2023-04-19 RX ORDER — SODIUM CHLORIDE, SODIUM LACTATE, POTASSIUM CHLORIDE, CALCIUM CHLORIDE 600; 310; 30; 20 MG/100ML; MG/100ML; MG/100ML; MG/100ML
INJECTION, SOLUTION INTRAVENOUS CONTINUOUS
Status: CANCELLED | OUTPATIENT
Start: 2023-04-19

## 2023-04-19 RX ORDER — PANTOPRAZOLE SODIUM 40 MG/1
TABLET, DELAYED RELEASE ORAL
COMMUNITY
Start: 2023-04-14

## 2023-04-19 NOTE — DISCHARGE INSTRUCTIONS
Information to Prepare you for your Surgery    PRE-ADMIT TESTING -  [phone]    [address] [facility]          Your surgery has been scheduled at [facility]. We are pleased to have the opportunity to serve you. For Further Information please call [phone].    On the day of surgery please report to the Information Desk on the 1st floor.    CONTACT YOUR PHYSICIAN'S OFFICE THE DAY PRIOR TO YOUR SURGERY TO OBTAIN YOUR ARRIVAL TIME.     The evening before surgery do not eat anything after 9 p.m. ( this includes hard candy, chewing gum and mints).  You may only have GATORADE, POWERADE AND WATER  from 9 p.m. until you leave your home.   DO NOT DRINK ANY LIQUIDS ON THE WAY TO THE HOSPITAL.      Why does your anesthesiologist allow you to drink Gatorade/Powerade before surgery?  Gatorade/Powerade helps to increase your comfort before surgery and to decrease your nausea after surgery. The carbohydrates in Gatorade/Powerade help reduce your body's stress response to surgery.  If you are a diabetic-drink only water prior to surgery.       Patients may have 2 visitors pre and post procedure. Only 2 visitors will be allowed in the Surgical building with the patient. No one under the age of 12 will be allowed into the facility.    SPECIAL MEDICATION INSTRUCTIONS: TAKE medications checked off by the Anesthesiologist on your Medication List.    Angiogram Patients: Take medications as instructed by your physician, including aspirin.     Surgery Patients:    If you take ASPIRIN - Your PHYSICIAN/SURGEON will need to inform you IF/OR when you need to stop taking aspirin prior to your surgery.     The week prior to surgery do not ot take any medications containing IBUPROFEN or NSAIDS ( Advil, Motrin, Goodys, BC, Aleve, Naproxen etc) If you are not sure if you should take a medicine please call your surgeon's office.  Ok to take Tylenol    Do Not Wear any make-up  (especially eye make-up) to surgery. Please remove any false eyelashes or eyelash extensions. If you arrive the day of surgery with makeup/eyelashes on you will be required to remove prior to surgery. (There is a risk of corneal abrasions if eye makeup/eyelash extensions are not removed)      Leave all valuables at home.   Do Not wear any jewelry or watches, including any metal in body piercings. Jewelry must be removed prior to coming to the hospital.  There is a possibility that rings that are unable to be removed may be cut off if they are on the surgical extremity.    Please remove all hair extensions, wigs, clips and any other metal accessories/ ornaments from your hair.  These items may pose a flammable/fire risk in Surgery and must be removed.    Do not shave your surgical area at least 5 days prior to your surgery. The surgical prep will be performed at the hospital according to Infection Control regulations.    Contact Lens must be removed before surgery. Either do not wear the contact lens or bring a case and solution for storage.  Please bring a container for eyeglasses or dentures as required.  Bring any paperwork your physician has provided, such as consent forms,  history and physicals, doctor's orders, etc.   Bring comfortable clothes that are loose fitting to wear upon discharge. Take into consideration the type of surgery being performed.  Maintain your diet as advised per your physician the day prior to surgery.      Adequate rest the night before surgery is advised.   Park in the Parking lot behind the hospital or in the [facility] across the street from the parking lot. Parking is complimentary.  If you will be discharged the same day as your procedure, please arrange for a responsible adult to drive you home or to accompany you if traveling by taxi.   YOU WILL NOT BE PERMITTED TO DRIVE OR TO LEAVE THE HOSPITAL ALONE AFTER SURGERY.   If you are being discharged the same day, it is  strongly recommended that you arrange for someone to remain with you for the first 24 hrs following your surgery.    The Surgeon will speak to your family/visitor after your surgery regarding the outcome of your surgery and post op care.  The Surgeon may speak to you after your surgery, but there is a possibility you may not remember the details.  Please check with your family members regarding the conversation with the Surgeon.    We strongly recommend whoever is bringing you home be present for discharge instructions.  This will ensure a thorough understanding for your post op home care.    ALL CHILDREN MUST ALWAYS BE ACCOMPANIED BY AN ADULT.    Visitors-Refer to current Visitor policy handouts.    Thank you for your cooperation.  The Staff of [facility].            Bathing Instructions with Hibiclens    Shower the evening before and morning of your procedure with Chlorhexidine (Hibiclens)  do not use Chlorhexidine on your face or genitals. Do not get in your eyes.  Wash your face with water and your regular face wash/soap  Use your regular shampoo  Apply Chlorhexidine (Hibiclens) directly on your skin or on a wet washcloth and wash gently. When showering: Move away from the shower stream when applying Chlorhexidine (Hibiclens) to avoid rinsing off too soon.  Rinse thoroughly with warm water  Do not dilute Chlorhexidine (Hibiclens)   Dry off as usual, do not use any deodorant, powder, body lotions, perfume, after shave or cologne.

## 2023-07-07 ENCOUNTER — ANESTHESIA EVENT (OUTPATIENT)
Dept: SURGERY | Facility: OTHER | Age: 78
End: 2023-07-07
Payer: MEDICARE

## 2023-07-13 NOTE — H&P
[facility] - [facility] ([place])  History & Physical    Subjective:      Intolerable pain both knees.  Long discussion regarding options.  Risks of performed bilateral knee replacements elected.  He is very concerned regarding repetitive anesthesia events.    There are no problems to display for this patient.    Past Medical History:   Diagnosis Date    BPH (benign prostatic hyperplasia)     Hair loss     Hyperlipidemia     Osteoarthritis      Past Surgical History:   Procedure Laterality Date    HIP REPLACEMENT ARTHROPLASTY Left 2020    Procedure: ARTHROPLASTY, HIP REPLACEMENT;  Surgeon: Celestine Cox MD;  Location: [facility];  Service: Orthopedics;  Laterality: Left;    KNEE ARTHROSCOPY      TONSILLECTOMY       No medications prior to admission.     Review of patient's allergies indicates:   Allergen Reactions    Ciprofloxacin Rash    Metronidazole Rash     Social History     Tobacco Use    Smoking status: Former     Types: Cigarettes     Quit date: 1986     Years since quittin.5    Smokeless tobacco: Never   Substance Use Topics    Alcohol use: Yes     Comment: none 24 hr prior to surgery     Family History   Adopted: Yes   Problem Relation Age of Onset    No Known Problems Mother     No Known Problems Father      Social History     Tobacco Use    Smoking status: Former     Types: Cigarettes     Quit date: 1986     Years since quittin.5    Smokeless tobacco: Never   Substance Use Topics    Alcohol use: Yes     Comment: none 24 hr prior to surgery    Drug use: Not Currently       No medications prior to admission.     Review of patient's allergies indicates:   Allergen Reactions    Ciprofloxacin Rash    Metronidazole Rash        Review of Systems:  All other systems reviewed and are negative.  .    No current facility-administered medications for this encounter.     Current Outpatient Medications   Medication Sig    ALPRAZolam (XANAX) 1 MG tablet TK 1 T PO QHS PRN    atorvastatin (LIPITOR) 40  MG tablet Take 40 mg by mouth every evening.    finasteride (PROSCAR) 5 mg tablet TK 1/2 T PO QD    oxyCODONE-acetaminophen (PERCOCET)  mg per tablet Take 1 tablet by mouth every 6 (six) hours as needed. (Patient not taking: Reported on 4/27/2021)    pantoprazole (PROTONIX) 40 MG tablet        Objective:      Vital Signs (Most Recent)       Vital Signs Range (Last 24H):       Physical Exam heart regular lungs clear crepitance tenderness both knees with tenderness swelling hip replacement doing well    Imaging Review:   Loss of joint space sclerosis osteophyte formation both knees      Assessment:      There are no hospital problems to display for this patient.      Plan:    The various methods of treatment have been discussed with the patient and family.   After consideration of risks, benefits and other options for treatment, the patient has consented to surgical interventions (significant risk discussed bilateral knees).  Questions were answered and Pre-op teaching was done by me.

## 2023-07-21 NOTE — PRE-PROCEDURE INSTRUCTIONS
Pre admit phone call completed.    Instructions given to patient about NPO status as follows:     The evening before surgery do not eat anything after 9 p.m. ( this includes hard candy, chewing gum and mints).  You may only have GATORADE, POWERADE AND WATER from 9 p.m. until you leave your home. DO NOT  DRINK ANY LIQUIDS ON THE WAY TO THE HOSPITAL.      Patient was also instructed on the below information:    Park in the Parking lot behind the hospital or in the [employer] [facility] across the street from the parking lot.  Parking is complimentary.  If you will be discharged the same day as your procedure, please arrange for a responsible adult to drive you home or  to accompany you if traveling by taxi.  YOU WILL NOT BE PERMITTED TO DRIVE OR TO LEAVE THE HOSPITAL ALONE AFTER SURGERY.  It is strongly recommended that you arrange for someone to remain with you for the first 24 hrs following your surgery.    Patient verbalized understanding of above instructions.

## 2023-07-24 ENCOUNTER — OFFICE VISIT (OUTPATIENT)
Dept: URGENT CARE | Facility: CLINIC | Age: 78
End: 2023-07-24
Payer: MEDICARE

## 2023-07-24 VITALS
WEIGHT: 225 LBS | BODY MASS INDEX: 30.48 KG/M2 | HEART RATE: 69 BPM | OXYGEN SATURATION: 97 % | HEIGHT: 72 IN | SYSTOLIC BLOOD PRESSURE: 134 MMHG | TEMPERATURE: 98 F | DIASTOLIC BLOOD PRESSURE: 91 MMHG | RESPIRATION RATE: 16 BRPM

## 2023-07-24 DIAGNOSIS — R09.81 NASAL CONGESTION: Primary | ICD-10-CM

## 2023-07-24 DIAGNOSIS — Z11.59 ENCOUNTER FOR SCREENING FOR OTHER VIRAL DISEASES: ICD-10-CM

## 2023-07-24 LAB
CTP QC/QA: YES
SARS-COV-2 AG RESP QL IA.RAPID: NEGATIVE

## 2023-07-24 PROCEDURE — 99212 PR OFFICE/OUTPT VISIT, EST, LEVL II, 10-19 MIN: ICD-10-PCS | Mod: S$GLB,,,

## 2023-07-24 PROCEDURE — 99212 OFFICE O/P EST SF 10 MIN: CPT | Mod: S$GLB,,,

## 2023-07-24 PROCEDURE — 87811 SARS-COV-2 COVID19 W/OPTIC: CPT | Mod: QW,S$GLB,,

## 2023-07-24 PROCEDURE — 87811 SARS CORONAVIRUS 2 ANTIGEN POCT, MANUAL READ: ICD-10-PCS | Mod: QW,S$GLB,,

## 2023-07-24 NOTE — ANESTHESIA PREPROCEDURE EVALUATION
07/24/2023  Mao Pearce is a 77 y.o., male.      Pre-op Assessment    I have reviewed the Patient Summary Reports.     I have reviewed the Nursing Notes. I have reviewed the NPO Status.   I have reviewed the Medications.     Review of Systems  Anesthesia Hx:  No problems with previous Anesthesia  History of prior surgery of interest to airway management or planning: Previous anesthesia: Spinal  2020 [facility] for total hip with spinal.  Procedure performed at an [facility]. Denies Family Hx of Anesthesia complications.   Denies Personal Hx of Anesthesia complications.   Social:  Non-Smoker    Hematology/Oncology:  Hematology Normal   Oncology Normal     EENT/Dental:EENT/Dental Normal   Cardiovascular:   Exercise tolerance: good hyperlipidemia Normal nuclear stress test March 2023   Pulmonary:  Pulmonary Normal    Renal/:   BPH    Hepatic/GI:  Hepatic/GI Normal    Musculoskeletal:   Arthritis     Neurological:  Neurology Normal    Endocrine:  Endocrine Normal    Dermatological:  Skin Normal    Psych:  Psychiatric Normal           Physical Exam  General: Well nourished, Cooperative, Alert and Oriented    Airway:  Mallampati: II   Mouth Opening: Normal  Neck ROM: Normal ROM    Dental:  Caps / Implants        Anesthesia Plan  Type of Anesthesia, risks & benefits discussed:    Anesthesia Type: Spinal  Intra-op Monitoring Plan: Standard ASA Monitors  Post Op Pain Control Plan: multimodal analgesia and peripheral nerve block  Informed Consent: Informed consent signed with the Patient and all parties understand the risks and agree with anesthesia plan.  All questions answered.   ASA Score: 2  Anesthesia Plan Notes: Labs ok in epic,normal stress test  Plan bilat adductor blocks,then Spinal w sedation for case    Ready For Surgery From Anesthesia Perspective.     .

## 2023-07-24 NOTE — PRE ADMISSION SCREENING
Surgery rescheduled. 7/10/23-Dr. Perez reviewed previous testing results and does not require any repeat testing.  Spoke to Dr. Cox-no repeat testing needed.

## 2023-07-24 NOTE — PROGRESS NOTES
Subjective:      Patient ID: Mao Pearce is a 77 y.o. male.    Vitals:  height is 6' (1.829 m) and weight is 102.1 kg (225 lb). His oral temperature is 97.8 °F (36.6 °C). His blood pressure is 134/91 (abnormal) and his pulse is 69. His respiration is 16 and oxygen saturation is 97%.     Chief Complaint: Sinus Problem    Patient reports nasal congestion started this morning.Patient reports some friends have covid and he is scheduled to go into hospital tomorrow.Patient wants no prescriptions.    Sinus Problem  This is a new problem. The current episode started today. The problem has been gradually worsening since onset. There has been no fever. His pain is at a severity of 7/10. Associated symptoms include congestion, headaches and sinus pressure. Pertinent negatives include no coughing or sore throat. Past treatments include nasal decongestants. The treatment provided no relief.     HENT:  Positive for congestion and sinus pressure. Negative for sore throat.    Respiratory:  Negative for cough.    Neurological:  Positive for headaches.    Objective:     Physical Exam   Constitutional: He is oriented to person, place, and time. He appears well-developed. He is cooperative.  Non-toxic appearance. He does not appear ill. No distress.   HENT:   Head: Normocephalic and atraumatic.   Ears:   Right Ear: Hearing, tympanic membrane, external ear and ear canal normal.   Left Ear: Hearing, tympanic membrane, external ear and ear canal normal.   Nose: Congestion present. No mucosal edema, rhinorrhea or nasal deformity. No epistaxis. Right sinus exhibits no maxillary sinus tenderness and no frontal sinus tenderness. Left sinus exhibits no maxillary sinus tenderness and no frontal sinus tenderness.   Mouth/Throat: Uvula is midline, oropharynx is clear and moist and mucous membranes are normal. No trismus in the jaw. Normal dentition. No uvula swelling. No oropharyngeal exudate, posterior oropharyngeal edema or posterior  oropharyngeal erythema.   Eyes: Conjunctivae and lids are normal. No scleral icterus.   Neck: Trachea normal and phonation normal. Neck supple. No edema present. No erythema present. No neck rigidity present.   Cardiovascular: Normal rate, regular rhythm, normal heart sounds and normal pulses.   Pulmonary/Chest: Effort normal and breath sounds normal. No respiratory distress. He has no decreased breath sounds. He has no rhonchi.   Abdominal: Normal appearance.   Musculoskeletal: Normal range of motion.         General: No deformity. Normal range of motion.   Neurological: He is alert and oriented to person, place, and time. He exhibits normal muscle tone. Coordination normal.   Skin: Skin is warm, dry, intact, not diaphoretic and not pale.   Psychiatric: His speech is normal and behavior is normal. Judgment and thought content normal.   Nursing note and vitals reviewed.  Results for orders placed or performed in visit on 07/24/23   SARS Coronavirus 2 Antigen, POCT Manual Read   Result Value Ref Range    SARS Coronavirus 2 Antigen Negative Negative     Acceptable Yes        Assessment:     1. Nasal congestion    2. Encounter for screening for other viral diseases        Plan:       Nasal congestion    Encounter for screening for other viral diseases  -     SARS Coronavirus 2 Antigen, POCT Manual Read                Patient Instructions   - Rest.    - Drink plenty of fluids.     - You can take over-the-counter claritin, zyrtec, allegra, OR xyzal as directed. These are antihistamines that can help with runny nose, nasal congestion, sneezing, and helps to dry up post-nasal drip, which usually causes sore throat and cough.    - You can take plain Mucinex (guaifenesin) 1200 mg twice a day to help loosen mucous.      - You can use Flonase (fluticasone) nasal spray as directed for sinus congestion and postnasal drip. This is a steroid nasal spray that works locally over time to decrease the inflammation in  your nose/sinuses and help with allergic symptoms. This is not an quick- relief spray like afrin, but it works well if used daily.  Discontinue if you develop nose bleed  - Use nasal saline prior to Flonase.  - Use Ocean Spray Nasal Saline 1-3 puffs each nostril every 2-3 hours then blow out onto tissue. This is to irrigate the nasal passage way to clear the sinus openings. Use until sinus problem resolved.    - A Neti Pot with sterile saline can help break up nasal congestion and give relief.      - Chloraseptic throat spray can help numb the throat.     - Warm salt water gargles can help with sore throat.  - Warm tea with honey can help with sore throat and cough. Honey is a natural cough suppressant.    - You must understand that you have received an Urgent Care treatment only and that you may be released before all of your medical problems are known or treated.   - You, the patient, will arrange for follow up care as instructed.   - If your condition worsens or fails to improve we recommend that you receive another evaluation at the ER immediately or contact your PCP to discuss your concerns or return here.   - Follow up with your PCP or specialty clinic as directed in the next 1-2 weeks if not improved or as needed.  You can call [phone] to schedule an appointment with the appropriate provider.    If your symptoms do not improve or worsen, go to the emergency room immediately.

## 2023-07-24 NOTE — PATIENT INSTRUCTIONS
- Rest.    - Drink plenty of fluids.     - You can take over-the-counter claritin, zyrtec, allegra, OR xyzal as directed. These are antihistamines that can help with runny nose, nasal congestion, sneezing, and helps to dry up post-nasal drip, which usually causes sore throat and cough.    - You can take plain Mucinex (guaifenesin) 1200 mg twice a day to help loosen mucous.      - You can use Flonase (fluticasone) nasal spray as directed for sinus congestion and postnasal drip. This is a steroid nasal spray that works locally over time to decrease the inflammation in your nose/sinuses and help with allergic symptoms. This is not an quick- relief spray like afrin, but it works well if used daily.  Discontinue if you develop nose bleed  - Use nasal saline prior to Flonase.  - Use Ocean Spray Nasal Saline 1-3 puffs each nostril every 2-3 hours then blow out onto tissue. This is to irrigate the nasal passage way to clear the sinus openings. Use until sinus problem resolved.    - A Neti Pot with sterile saline can help break up nasal congestion and give relief.      - Chloraseptic throat spray can help numb the throat.     - Warm salt water gargles can help with sore throat.  - Warm tea with honey can help with sore throat and cough. Honey is a natural cough suppressant.    - You must understand that you have received an Urgent Care treatment only and that you may be released before all of your medical problems are known or treated.   - You, the patient, will arrange for follow up care as instructed.   - If your condition worsens or fails to improve we recommend that you receive another evaluation at the ER immediately or contact your PCP to discuss your concerns or return here.   - Follow up with your PCP or specialty clinic as directed in the next 1-2 weeks if not improved or as needed.  You can call [phone] to schedule an appointment with the appropriate provider.    If your symptoms do not improve or worsen, go  to the emergency room immediately.

## 2023-07-24 NOTE — PLAN OF CARE
Assessment completed with patient via phone - дмитрий knee arthroplasty scheduled tomorrow - will need IPR - no friends/family to assist during recovery - will follow post op therapy [facility] ([facility])  Discharge Assessment    Primary Care Provider: Primary Doctor No     Discharge Assessment (most recent)       BRIEF DISCHARGE ASSESSMENT - 07/24/23 1451          Discharge Planning    Assessment Type Discharge Planning Brief Assessment     Support Systems None     Equipment Currently Used at Home none     Current Living Arrangements condominium     Patient/Family Anticipates Transition to inpatient rehabilitation facility     Patient/Family Anticipated Services at Transition rehabilitation services     DME Needed Upon Discharge  --   TBD    Discharge Plan A Rehab

## 2023-07-25 ENCOUNTER — ANESTHESIA (OUTPATIENT)
Dept: SURGERY | Facility: OTHER | Age: 78
End: 2023-07-25
Payer: MEDICARE

## 2023-07-25 ENCOUNTER — HOSPITAL ENCOUNTER (OUTPATIENT)
Facility: OTHER | Age: 78
Discharge: NURSING, REHAB OR LONG-TERM CARE | End: 2023-07-27
Attending: ORTHOPAEDIC SURGERY | Admitting: ORTHOPAEDIC SURGERY
Payer: MEDICARE

## 2023-07-25 DIAGNOSIS — M17.9 OA (OSTEOARTHRITIS) OF KNEE: ICD-10-CM

## 2023-07-25 PROBLEM — M17.0 BILATERAL PRIMARY OSTEOARTHRITIS OF KNEE: Status: ACTIVE | Noted: 2023-07-25

## 2023-07-25 PROCEDURE — 30200315 PPD INTRADERMAL TEST REV CODE 302: Performed by: ORTHOPAEDIC SURGERY

## 2023-07-25 PROCEDURE — 97116 GAIT TRAINING THERAPY: CPT | Mod: CQ

## 2023-07-25 PROCEDURE — 86580 TB INTRADERMAL TEST: CPT | Performed by: ORTHOPAEDIC SURGERY

## 2023-07-25 PROCEDURE — 25000003 PHARM REV CODE 250: Performed by: NURSE ANESTHETIST, CERTIFIED REGISTERED

## 2023-07-25 PROCEDURE — C9290 INJ, BUPIVACAINE LIPOSOME: HCPCS | Performed by: ANESTHESIOLOGY

## 2023-07-25 PROCEDURE — 99900035 HC TECH TIME PER 15 MIN (STAT)

## 2023-07-25 PROCEDURE — 36000711: Performed by: ORTHOPAEDIC SURGERY

## 2023-07-25 PROCEDURE — 25000003 PHARM REV CODE 250: Performed by: ANESTHESIOLOGY

## 2023-07-25 PROCEDURE — 64447 NJX AA&/STRD FEMORAL NRV IMG: CPT | Performed by: ANESTHESIOLOGY

## 2023-07-25 PROCEDURE — 97162 PT EVAL MOD COMPLEX 30 MIN: CPT

## 2023-07-25 PROCEDURE — 64447 PERIPHERAL BLOCK: ICD-10-PCS | Mod: 59,50,, | Performed by: ANESTHESIOLOGY

## 2023-07-25 PROCEDURE — 37000008 HC ANESTHESIA 1ST 15 MINUTES: Performed by: ORTHOPAEDIC SURGERY

## 2023-07-25 PROCEDURE — 97165 OT EVAL LOW COMPLEX 30 MIN: CPT

## 2023-07-25 PROCEDURE — C1713 ANCHOR/SCREW BN/BN,TIS/BN: HCPCS | Performed by: ORTHOPAEDIC SURGERY

## 2023-07-25 PROCEDURE — 36000710: Performed by: ORTHOPAEDIC SURGERY

## 2023-07-25 PROCEDURE — D9220A PRA ANESTHESIA: ICD-10-PCS | Mod: CRNA,,, | Performed by: NURSE ANESTHETIST, CERTIFIED REGISTERED

## 2023-07-25 PROCEDURE — D9220A PRA ANESTHESIA: ICD-10-PCS | Mod: ANES,,, | Performed by: ANESTHESIOLOGY

## 2023-07-25 PROCEDURE — 64447 NJX AA&/STRD FEMORAL NRV IMG: CPT | Mod: 59,50,, | Performed by: ANESTHESIOLOGY

## 2023-07-25 PROCEDURE — 63600175 PHARM REV CODE 636 W HCPCS: Performed by: ORTHOPAEDIC SURGERY

## 2023-07-25 PROCEDURE — 97530 THERAPEUTIC ACTIVITIES: CPT | Mod: CQ

## 2023-07-25 PROCEDURE — 63600175 PHARM REV CODE 636 W HCPCS: Performed by: ANESTHESIOLOGY

## 2023-07-25 PROCEDURE — 27201423 OPTIME MED/SURG SUP & DEVICES STERILE SUPPLY: Performed by: ORTHOPAEDIC SURGERY

## 2023-07-25 PROCEDURE — 71000033 HC RECOVERY, INTIAL HOUR: Performed by: ORTHOPAEDIC SURGERY

## 2023-07-25 PROCEDURE — 94799 UNLISTED PULMONARY SVC/PX: CPT

## 2023-07-25 PROCEDURE — C1776 JOINT DEVICE (IMPLANTABLE): HCPCS | Performed by: ORTHOPAEDIC SURGERY

## 2023-07-25 PROCEDURE — 63600175 PHARM REV CODE 636 W HCPCS: Performed by: NURSE ANESTHETIST, CERTIFIED REGISTERED

## 2023-07-25 PROCEDURE — 63600175 PHARM REV CODE 636 W HCPCS

## 2023-07-25 PROCEDURE — 97110 THERAPEUTIC EXERCISES: CPT | Mod: CQ

## 2023-07-25 PROCEDURE — D9220A PRA ANESTHESIA: Mod: CRNA,,, | Performed by: NURSE ANESTHETIST, CERTIFIED REGISTERED

## 2023-07-25 PROCEDURE — 25000003 PHARM REV CODE 250: Performed by: NURSE PRACTITIONER

## 2023-07-25 PROCEDURE — D9220A PRA ANESTHESIA: Mod: ANES,,, | Performed by: ANESTHESIOLOGY

## 2023-07-25 PROCEDURE — 71000039 HC RECOVERY, EACH ADD'L HOUR: Performed by: ORTHOPAEDIC SURGERY

## 2023-07-25 PROCEDURE — 37000009 HC ANESTHESIA EA ADD 15 MINS: Performed by: ORTHOPAEDIC SURGERY

## 2023-07-25 PROCEDURE — 25000003 PHARM REV CODE 250: Performed by: ORTHOPAEDIC SURGERY

## 2023-07-25 DEVICE — THE PALACOS® R PRO SYSTEM CONSISTS OF A MIXING AND APPLICATION SYSTEM PREFILLED WITH PALACOS® POLYMER POWDER AND MONOMER LIQUID. PALACOS® R PRO IS INTENDED FOR USE IN ARTHROPLASTIC PROCEDURES OF THE HIP, KNEE, AND OTHER JOINTS FOR THE FIXATION OF POLYMER OR METALLIC PROSTHETIC IMPLANTS TO LIVING BONE.
Type: IMPLANTABLE DEVICE | Site: KNEE | Status: FUNCTIONAL
Brand: PALACOS®

## 2023-07-25 DEVICE — TIBIAL TRAY CRUCIATE 79MM: Type: IMPLANTABLE DEVICE | Site: KNEE | Status: FUNCTIONAL

## 2023-07-25 DEVICE — BEARING ARCOM TIB 10X79MM: Type: IMPLANTABLE DEVICE | Site: KNEE | Status: FUNCTIONAL

## 2023-07-25 DEVICE — IMPLANTABLE DEVICE: Type: IMPLANTABLE DEVICE | Site: KNEE | Status: FUNCTIONAL

## 2023-07-25 RX ORDER — SODIUM CHLORIDE, SODIUM LACTATE, POTASSIUM CHLORIDE, CALCIUM CHLORIDE 600; 310; 30; 20 MG/100ML; MG/100ML; MG/100ML; MG/100ML
INJECTION, SOLUTION INTRAVENOUS CONTINUOUS
Status: DISCONTINUED | OUTPATIENT
Start: 2023-07-25 | End: 2023-07-25

## 2023-07-25 RX ORDER — POLYETHYLENE GLYCOL 3350 17 G/17G
17 POWDER, FOR SOLUTION ORAL DAILY
Status: DISCONTINUED | OUTPATIENT
Start: 2023-07-25 | End: 2023-07-27 | Stop reason: HOSPADM

## 2023-07-25 RX ORDER — ROPIVACAINE HYDROCHLORIDE 5 MG/ML
INJECTION, SOLUTION EPIDURAL; INFILTRATION; PERINEURAL
Status: COMPLETED | OUTPATIENT
Start: 2023-07-25 | End: 2023-07-25

## 2023-07-25 RX ORDER — KETAMINE HYDROCHLORIDE 100 MG/ML
INJECTION, SOLUTION INTRAMUSCULAR; INTRAVENOUS
Status: DISCONTINUED | OUTPATIENT
Start: 2023-07-25 | End: 2023-07-25

## 2023-07-25 RX ORDER — FAMOTIDINE 20 MG/1
20 TABLET, FILM COATED ORAL DAILY
Status: DISCONTINUED | OUTPATIENT
Start: 2023-07-26 | End: 2023-07-27 | Stop reason: HOSPADM

## 2023-07-25 RX ORDER — TRANEXAMIC ACID 100 MG/ML
INJECTION, SOLUTION INTRAVENOUS
Status: DISCONTINUED | OUTPATIENT
Start: 2023-07-25 | End: 2023-07-25

## 2023-07-25 RX ORDER — PREGABALIN 75 MG/1
75 CAPSULE ORAL 2 TIMES DAILY
Status: COMPLETED | OUTPATIENT
Start: 2023-07-25 | End: 2023-07-27

## 2023-07-25 RX ORDER — SODIUM CHLORIDE 9 MG/ML
INJECTION, SOLUTION INTRAVENOUS CONTINUOUS
Status: DISCONTINUED | OUTPATIENT
Start: 2023-07-25 | End: 2023-07-25

## 2023-07-25 RX ORDER — PROPOFOL 10 MG/ML
VIAL (ML) INTRAVENOUS
Status: DISCONTINUED | OUTPATIENT
Start: 2023-07-25 | End: 2023-07-25

## 2023-07-25 RX ORDER — OXYCODONE AND ACETAMINOPHEN 10; 325 MG/1; MG/1
1 TABLET ORAL EVERY 4 HOURS PRN
Status: DISCONTINUED | OUTPATIENT
Start: 2023-07-25 | End: 2023-07-27 | Stop reason: HOSPADM

## 2023-07-25 RX ORDER — TALC
8 POWDER (GRAM) TOPICAL NIGHTLY PRN
Status: DISCONTINUED | OUTPATIENT
Start: 2023-07-25 | End: 2023-07-27 | Stop reason: HOSPADM

## 2023-07-25 RX ORDER — EPHEDRINE SULFATE 50 MG/ML
INJECTION, SOLUTION INTRAVENOUS
Status: DISCONTINUED | OUTPATIENT
Start: 2023-07-25 | End: 2023-07-25

## 2023-07-25 RX ORDER — MIDAZOLAM HYDROCHLORIDE 1 MG/ML
INJECTION, SOLUTION INTRAMUSCULAR; INTRAVENOUS
Status: DISCONTINUED | OUTPATIENT
Start: 2023-07-25 | End: 2023-07-25

## 2023-07-25 RX ORDER — ONDANSETRON 8 MG/1
8 TABLET, ORALLY DISINTEGRATING ORAL EVERY 8 HOURS PRN
Status: DISCONTINUED | OUTPATIENT
Start: 2023-07-25 | End: 2023-07-27 | Stop reason: HOSPADM

## 2023-07-25 RX ORDER — MUPIROCIN 20 MG/G
OINTMENT TOPICAL 2 TIMES DAILY
Status: DISCONTINUED | OUTPATIENT
Start: 2023-07-25 | End: 2023-07-27 | Stop reason: HOSPADM

## 2023-07-25 RX ORDER — ALPRAZOLAM 0.25 MG/1
1 TABLET ORAL NIGHTLY PRN
Status: DISCONTINUED | OUTPATIENT
Start: 2023-07-25 | End: 2023-07-27 | Stop reason: HOSPADM

## 2023-07-25 RX ORDER — DEXAMETHASONE SODIUM PHOSPHATE 4 MG/ML
INJECTION, SOLUTION INTRA-ARTICULAR; INTRALESIONAL; INTRAMUSCULAR; INTRAVENOUS; SOFT TISSUE
Status: DISCONTINUED | OUTPATIENT
Start: 2023-07-25 | End: 2023-07-25

## 2023-07-25 RX ORDER — HYDROMORPHONE HYDROCHLORIDE 2 MG/ML
0.4 INJECTION, SOLUTION INTRAMUSCULAR; INTRAVENOUS; SUBCUTANEOUS EVERY 5 MIN PRN
Status: DISCONTINUED | OUTPATIENT
Start: 2023-07-25 | End: 2023-07-25 | Stop reason: HOSPADM

## 2023-07-25 RX ORDER — OXYCODONE AND ACETAMINOPHEN 5; 325 MG/1; MG/1
1 TABLET ORAL EVERY 4 HOURS PRN
Status: DISCONTINUED | OUTPATIENT
Start: 2023-07-25 | End: 2023-07-27 | Stop reason: HOSPADM

## 2023-07-25 RX ORDER — PROCHLORPERAZINE EDISYLATE 5 MG/ML
5 INJECTION INTRAMUSCULAR; INTRAVENOUS EVERY 30 MIN PRN
Status: DISCONTINUED | OUTPATIENT
Start: 2023-07-25 | End: 2023-07-25 | Stop reason: HOSPADM

## 2023-07-25 RX ORDER — CEFAZOLIN SODIUM 1 G/3ML
2 INJECTION, POWDER, FOR SOLUTION INTRAMUSCULAR; INTRAVENOUS
Status: COMPLETED | OUTPATIENT
Start: 2023-07-25 | End: 2023-07-25

## 2023-07-25 RX ORDER — ATORVASTATIN CALCIUM 20 MG/1
40 TABLET, FILM COATED ORAL NIGHTLY
Status: DISCONTINUED | OUTPATIENT
Start: 2023-07-25 | End: 2023-07-27 | Stop reason: HOSPADM

## 2023-07-25 RX ORDER — PROPOFOL 10 MG/ML
VIAL (ML) INTRAVENOUS CONTINUOUS PRN
Status: DISCONTINUED | OUTPATIENT
Start: 2023-07-25 | End: 2023-07-25

## 2023-07-25 RX ORDER — SODIUM CHLORIDE 0.9 % (FLUSH) 0.9 %
3 SYRINGE (ML) INJECTION
Status: DISCONTINUED | OUTPATIENT
Start: 2023-07-25 | End: 2023-07-25 | Stop reason: HOSPADM

## 2023-07-25 RX ORDER — MEPERIDINE HYDROCHLORIDE 25 MG/ML
12.5 INJECTION INTRAMUSCULAR; INTRAVENOUS; SUBCUTANEOUS ONCE AS NEEDED
Status: DISCONTINUED | OUTPATIENT
Start: 2023-07-25 | End: 2023-07-25 | Stop reason: HOSPADM

## 2023-07-25 RX ORDER — ONDANSETRON 2 MG/ML
INJECTION INTRAMUSCULAR; INTRAVENOUS
Status: DISCONTINUED | OUTPATIENT
Start: 2023-07-25 | End: 2023-07-25

## 2023-07-25 RX ORDER — BUPIVACAINE HYDROCHLORIDE 2.5 MG/ML
INJECTION, SOLUTION EPIDURAL; INFILTRATION; INTRACAUDAL
Status: COMPLETED | OUTPATIENT
Start: 2023-07-25 | End: 2023-07-25

## 2023-07-25 RX ORDER — LIDOCAINE HYDROCHLORIDE 20 MG/ML
INJECTION, SOLUTION EPIDURAL; INFILTRATION; INTRACAUDAL; PERINEURAL
Status: DISCONTINUED | OUTPATIENT
Start: 2023-07-25 | End: 2023-07-25

## 2023-07-25 RX ORDER — ROPIVACAINE HYDROCHLORIDE 5 MG/ML
INJECTION, SOLUTION EPIDURAL; INFILTRATION; PERINEURAL
Status: DISCONTINUED | OUTPATIENT
Start: 2023-07-25 | End: 2023-07-25 | Stop reason: HOSPADM

## 2023-07-25 RX ORDER — MORPHINE SULFATE 4 MG/ML
4 INJECTION, SOLUTION INTRAMUSCULAR; INTRAVENOUS
Status: DISCONTINUED | OUTPATIENT
Start: 2023-07-25 | End: 2023-07-26

## 2023-07-25 RX ORDER — NAPROXEN SODIUM 220 MG/1
81 TABLET, FILM COATED ORAL 2 TIMES DAILY
Status: DISCONTINUED | OUTPATIENT
Start: 2023-07-25 | End: 2023-07-27 | Stop reason: HOSPADM

## 2023-07-25 RX ORDER — SODIUM CHLORIDE 0.9 % (FLUSH) 0.9 %
5 SYRINGE (ML) INJECTION
Status: DISCONTINUED | OUTPATIENT
Start: 2023-07-25 | End: 2023-07-27 | Stop reason: HOSPADM

## 2023-07-25 RX ORDER — LIDOCAINE HYDROCHLORIDE 10 MG/ML
0.5 INJECTION, SOLUTION EPIDURAL; INFILTRATION; INTRACAUDAL; PERINEURAL ONCE
Status: DISCONTINUED | OUTPATIENT
Start: 2023-07-25 | End: 2023-07-25 | Stop reason: HOSPADM

## 2023-07-25 RX ORDER — DEXTROSE MONOHYDRATE AND SODIUM CHLORIDE 5; .9 G/100ML; G/100ML
INJECTION, SOLUTION INTRAVENOUS CONTINUOUS
Status: DISCONTINUED | OUTPATIENT
Start: 2023-07-25 | End: 2023-07-27 | Stop reason: HOSPADM

## 2023-07-25 RX ORDER — OXYCODONE HYDROCHLORIDE 5 MG/1
5 TABLET ORAL
Status: DISCONTINUED | OUTPATIENT
Start: 2023-07-25 | End: 2023-07-25 | Stop reason: HOSPADM

## 2023-07-25 RX ORDER — FINASTERIDE 5 MG/1
5 TABLET, FILM COATED ORAL DAILY
Status: DISCONTINUED | OUTPATIENT
Start: 2023-07-25 | End: 2023-07-27 | Stop reason: HOSPADM

## 2023-07-25 RX ORDER — METOCLOPRAMIDE HYDROCHLORIDE 5 MG/ML
5 INJECTION INTRAMUSCULAR; INTRAVENOUS EVERY 6 HOURS PRN
Status: DISCONTINUED | OUTPATIENT
Start: 2023-07-25 | End: 2023-07-27 | Stop reason: HOSPADM

## 2023-07-25 RX ORDER — FENTANYL CITRATE 50 UG/ML
INJECTION, SOLUTION INTRAMUSCULAR; INTRAVENOUS
Status: DISCONTINUED | OUTPATIENT
Start: 2023-07-25 | End: 2023-07-25

## 2023-07-25 RX ORDER — PREGABALIN 75 MG/1
75 CAPSULE ORAL 2 TIMES DAILY PRN
Status: DISCONTINUED | OUTPATIENT
Start: 2023-07-25 | End: 2023-07-25

## 2023-07-25 RX ADMIN — EPHEDRINE SULFATE 5 MG: 50 INJECTION INTRAVENOUS at 07:07

## 2023-07-25 RX ADMIN — ONDANSETRON HYDROCHLORIDE 4 MG: 2 INJECTION INTRAMUSCULAR; INTRAVENOUS at 06:07

## 2023-07-25 RX ADMIN — MUPIROCIN: 20 OINTMENT TOPICAL at 11:07

## 2023-07-25 RX ADMIN — FENTANYL CITRATE 50 MCG: 50 INJECTION, SOLUTION INTRAMUSCULAR; INTRAVENOUS at 06:07

## 2023-07-25 RX ADMIN — BUPIVACAINE HYDROCHLORIDE 30 ML: 2.5 INJECTION, SOLUTION EPIDURAL; INFILTRATION; INTRACAUDAL; PERINEURAL at 06:07

## 2023-07-25 RX ADMIN — MORPHINE SULFATE 4 MG: 4 INJECTION, SOLUTION INTRAMUSCULAR; INTRAVENOUS at 09:07

## 2023-07-25 RX ADMIN — ASPIRIN 81 MG CHEWABLE TABLET 81 MG: 81 TABLET CHEWABLE at 11:07

## 2023-07-25 RX ADMIN — SODIUM CHLORIDE, SODIUM LACTATE, POTASSIUM CHLORIDE, AND CALCIUM CHLORIDE: 600; 310; 30; 20 INJECTION, SOLUTION INTRAVENOUS at 06:07

## 2023-07-25 RX ADMIN — FINASTERIDE 5 MG: 5 TABLET, FILM COATED ORAL at 11:07

## 2023-07-25 RX ADMIN — POLYETHYLENE GLYCOL 3350 17 G: 17 POWDER, FOR SOLUTION ORAL at 11:07

## 2023-07-25 RX ADMIN — BUPIVACAINE 20 ML: 13.3 INJECTION, SUSPENSION, LIPOSOMAL INFILTRATION at 06:07

## 2023-07-25 RX ADMIN — TRANEXAMIC ACID 1000 MG: 100 INJECTION, SOLUTION INTRAVENOUS at 09:07

## 2023-07-25 RX ADMIN — CEFAZOLIN 2 G: 2 INJECTION, POWDER, FOR SOLUTION INTRAMUSCULAR; INTRAVENOUS at 03:07

## 2023-07-25 RX ADMIN — MORPHINE SULFATE 4 MG: 4 INJECTION, SOLUTION INTRAMUSCULAR; INTRAVENOUS at 03:07

## 2023-07-25 RX ADMIN — OXYCODONE AND ACETAMINOPHEN 1 TABLET: 10; 325 TABLET ORAL at 08:07

## 2023-07-25 RX ADMIN — OXYCODONE AND ACETAMINOPHEN 1 TABLET: 10; 325 TABLET ORAL at 02:07

## 2023-07-25 RX ADMIN — MIDAZOLAM HYDROCHLORIDE 1 MG: 1 INJECTION, SOLUTION INTRAMUSCULAR; INTRAVENOUS at 06:07

## 2023-07-25 RX ADMIN — KETAMINE HYDROCHLORIDE 30 MG: 100 INJECTION INTRAMUSCULAR; INTRAVENOUS at 07:07

## 2023-07-25 RX ADMIN — OXYCODONE HYDROCHLORIDE 5 MG: 5 TABLET ORAL at 09:07

## 2023-07-25 RX ADMIN — SODIUM CHLORIDE, SODIUM LACTATE, POTASSIUM CHLORIDE, AND CALCIUM CHLORIDE: 600; 310; 30; 20 INJECTION, SOLUTION INTRAVENOUS at 07:07

## 2023-07-25 RX ADMIN — DEXAMETHASONE SODIUM PHOSPHATE 8 MG: 4 INJECTION INTRA-ARTICULAR; INTRALESIONAL; INTRAMUSCULAR; INTRAVENOUS; SOFT TISSUE at 07:07

## 2023-07-25 RX ADMIN — MUPIROCIN: 20 OINTMENT TOPICAL at 09:07

## 2023-07-25 RX ADMIN — MORPHINE SULFATE 4 MG: 4 INJECTION, SOLUTION INTRAMUSCULAR; INTRAVENOUS at 05:07

## 2023-07-25 RX ADMIN — OXYCODONE HYDROCHLORIDE AND ACETAMINOPHEN 1 TABLET: 5; 325 TABLET ORAL at 12:07

## 2023-07-25 RX ADMIN — TRANEXAMIC ACID 1000 MG: 100 INJECTION, SOLUTION INTRAVENOUS at 07:07

## 2023-07-25 RX ADMIN — MORPHINE SULFATE 4 MG: 4 INJECTION, SOLUTION INTRAMUSCULAR; INTRAVENOUS at 07:07

## 2023-07-25 RX ADMIN — SODIUM CHLORIDE, SODIUM LACTATE, POTASSIUM CHLORIDE, AND CALCIUM CHLORIDE: 600; 310; 30; 20 INJECTION, SOLUTION INTRAVENOUS at 08:07

## 2023-07-25 RX ADMIN — ROPIVACAINE HYDROCHLORIDE 3 ML: 5 INJECTION, SOLUTION EPIDURAL; INFILTRATION; PERINEURAL at 06:07

## 2023-07-25 RX ADMIN — EPHEDRINE SULFATE 5 MG: 50 INJECTION INTRAVENOUS at 08:07

## 2023-07-25 RX ADMIN — PROPOFOL 40 MG: 10 INJECTION, EMULSION INTRAVENOUS at 07:07

## 2023-07-25 RX ADMIN — CEFAZOLIN 2 G: 330 INJECTION, POWDER, FOR SOLUTION INTRAMUSCULAR; INTRAVENOUS at 07:07

## 2023-07-25 RX ADMIN — ONDANSETRON 8 MG: 8 TABLET, ORALLY DISINTEGRATING ORAL at 02:07

## 2023-07-25 RX ADMIN — GLYCOPYRROLATE 0.2 MG: 0.2 INJECTION, SOLUTION INTRAMUSCULAR; INTRAVITREAL at 07:07

## 2023-07-25 RX ADMIN — TUBERCULIN PURIFIED PROTEIN DERIVATIVE 5 UNITS: 5 INJECTION, SOLUTION INTRADERMAL at 05:07

## 2023-07-25 RX ADMIN — LIDOCAINE HYDROCHLORIDE 50 MG: 20 INJECTION, SOLUTION EPIDURAL; INFILTRATION; INTRACAUDAL; PERINEURAL at 07:07

## 2023-07-25 RX ADMIN — PREGABALIN 75 MG: 75 CAPSULE ORAL at 08:07

## 2023-07-25 RX ADMIN — PROPOFOL 50 MCG/KG/MIN: 10 INJECTION, EMULSION INTRAVENOUS at 07:07

## 2023-07-25 RX ADMIN — ATORVASTATIN CALCIUM 40 MG: 20 TABLET, FILM COATED ORAL at 08:07

## 2023-07-25 RX ADMIN — ASPIRIN 81 MG CHEWABLE TABLET 81 MG: 81 TABLET CHEWABLE at 08:07

## 2023-07-25 RX ADMIN — DEXTROSE AND SODIUM CHLORIDE: 5; 900 INJECTION, SOLUTION INTRAVENOUS at 11:07

## 2023-07-25 NOTE — CONSULTS
Consult Note  MED    Consult Requested By: Celestine Cox MD  Reason for Consult: BPH/Lipids/OA    SUBJECTIVE:     History of Present Illness:  Patient is a 77 y.o. male presents with дмитрий knee repair.  Seen post op.  VSS.  Pre-op/Epic reviewed.  No CP/SOB/N/V/D/F/C.      Past Medical History:   Diagnosis Date    BPH (benign prostatic hyperplasia)     Hair loss     Hyperlipidemia     Osteoarthritis      Past Surgical History:   Procedure Laterality Date    HIP REPLACEMENT ARTHROPLASTY Left 2020    Procedure: ARTHROPLASTY, HIP REPLACEMENT;  Surgeon: Celestine Cox MD;  Location: [facility];  Service: Orthopedics;  Laterality: Left;    KNEE ARTHROSCOPY      TONSILLECTOMY       Family History   Adopted: Yes   Problem Relation Age of Onset    No Known Problems Mother     No Known Problems Father      Social History     Tobacco Use    Smoking status: Former     Types: Cigarettes     Quit date: 1986     Years since quittin.5    Smokeless tobacco: Never   Substance Use Topics    Alcohol use: Yes     Comment: none 24 hr prior to surgery    Drug use: Not Currently       Review of patient's allergies indicates:   Allergen Reactions    Ciprofloxacin Rash    Metronidazole Rash        Review of Systems:  Constitutional: No fever or chills  Respiratory: No cough or shortness of breath  Cardiovascular: No chest pain or palpitations  Gastrointestinal: No nausea or vomiting  Neurological: No confusion or weakness    OBJECTIVE:     Vital Signs (Most Recent)  Temp: 97.5 °F (36.4 °C) (23)  Pulse: 65 (23)  Resp: 16 (23)  BP: 124/85 (23)  SpO2: 97 % (23)    Vital Signs Range (Last 24H):  Temp:  [97.5 °F (36.4 °C)-97.8 °F (36.6 °C)]   Pulse:  [65-69]   Resp:  [16]   BP: (124-134)/(85-91)   SpO2:  [97 %]       Intake/Output Summary (Last 24 hours) at 2023 0859  Last data filed at 2023 0846  Gross per 24 hour   Intake 2000 ml   Output 400 ml   Net 1600 ml        Physical Exam:  General appearance: Well developed, well nourished  Eyes:  Conjunctivae/corneas clear. PERRL.  Lungs: Normal respiratory effort,   clear to auscultation bilaterally   Heart: Regular rate and rhythm, S1, S2 normal, no murmur, rub or nacho.  Abdomen: Soft, non-tender non-distended; bowel sounds normal; no masses,  no organomegaly  Extremities: No cyanosis or clubbing. No edema.    Skin: Skin color, texture, turgor normal. No rashes or lesions  Neurologic: Normal strength and tone. No focal numbness or weakness   Olivier knee CDI      Laboratory:  No results for input(s): WBC, RBC, HGB, HCT, PLT, MCV, MCH, MCHC in the last 24 hours.  BMP: No results for input(s): GLU, NA, K, CL, CO2, BUN, CREATININE, CALCIUM, MG in the last 168 hours.    Invalid input(s):  PHOS  Lab Results   Component Value Date    CALCIUM 9.3 04/19/2023     BNP  No results for input(s): BNP, BNPTRIAGEBLO in the last 168 hours.No results found for: URICACID  Lab Results   Component Value Date    IRON 206 (H) 12/19/2011    TIBC 390.0 12/19/2011    FERRITIN 296 12/19/2011     Lab Results   Component Value Date    CALCIUM 9.3 04/19/2023       Diagnostic Results:  No orders to display       ASSESSMENT/PLAN:     Olivier Knee:  per ortho/therapy teams.    BPH:  proscar and monitor post anesthesia voiding.     Lipids:  statin.    DVT:  ASA BID.      Thanks for consult  See above  Will follow along.

## 2023-07-25 NOTE — ANESTHESIA POSTPROCEDURE EVALUATION
Anesthesia Post Evaluation    Patient: Mao Pearce    Procedure(s) Performed: Procedure(s) (LRB):  ARTHROPLASTY, KNEE, TOTAL REPLACEMENT (Bilateral)    Final Anesthesia Type: spinal      Patient location during evaluation: PACU  Patient participation: Yes- Able to Participate  Level of consciousness: awake and alert  Post-procedure vital signs: reviewed and stable  Pain management: adequate  Airway patency: patent    PONV status at discharge: No PONV  Anesthetic complications: no      Cardiovascular status: blood pressure returned to baseline  Respiratory status: unassisted, spontaneous ventilation and room air  Hydration status: euvolemic  Follow-up not needed.          Vitals Value Taken Time   /67 07/25/23 1021   Temp 36.4 °C (97.6 °F) 07/25/23 0921   Pulse 60 07/25/23 1028   Resp 17 07/25/23 1000   SpO2 100 % 07/25/23 1028   Vitals shown include unvalidated device data.      Event Time   Out of Recovery 11:02:00         Pain/Kyleigh Score: Pain Rating Prior to Med Admin: 4 (7/25/2023  9:56 AM)  Pain Rating Post Med Admin: 0 (7/25/2023 10:21 AM)  Kyleigh Score: 9 (7/25/2023 10:21 AM)

## 2023-07-25 NOTE — PLAN OF CARE
"Spoke with patient regarding need for alternate SNF choices in the event [facility] could not accept - he was reluctant at first stating "I really just want the best care" then allowed CM to forward additional referrals stating "I got nothing else - just please get me somewhere good" - referrals forwarded via [facility]   "

## 2023-07-25 NOTE — TRANSFER OF CARE
Anesthesia Transfer of Care Note    Patient: Mao Pearce    Procedure(s) Performed: Procedure(s) (LRB):  ARTHROPLASTY, KNEE, TOTAL REPLACEMENT (Bilateral)    Patient location: PACU    Anesthesia Type: spinal    Transport from OR: Transported from OR on 2-3 L/min O2 by NC with adequate spontaneous ventilation    Post pain: adequate analgesia    Post assessment: no apparent anesthetic complications and tolerated procedure well    Post vital signs: stable    Level of consciousness: awake, alert and oriented    Nausea/Vomiting: no nausea/vomiting    Complications: none    Transfer of care protocol was followed      Last vitals:   Visit Vitals  BP (!) 112/54 (BP Location: Right arm, Patient Position: Lying)   Pulse 70   Temp 36.4 °C (97.6 °F) (Skin)   Resp 16   Ht 6' (1.829 m)   Wt 102.1 kg (225 lb)   SpO2 97%   BMI 30.52 kg/m²

## 2023-07-25 NOTE — OP NOTE
[facility]  Surgery Department  Operative Note    SUMMARY     Date of Procedure: 7/25/2023     Procedure: Procedure(s) (LRB):  ARTHROPLASTY, KNEE, TOTAL REPLACEMENT (Bilateral)     Surgeon(s) and Role:     * Celestine Cox MD - Primary     * Rolan Pruett MD - Assisting        Pre-Operative Diagnosis: Bilateral primary osteoarthritis of knee [M17.0]    Post-Operative Diagnosis: Post-Op Diagnosis Codes:     * Bilateral primary osteoarthritis of knee [M17.0]    Anesthesia: Choice    Operative Findings (including complications, if any):  Osteoarthritis both knees    Description of Technical Procedures:  Patient brought the operating room and spinal anesthesia as well as abductor block.  Both legs prepped in usual fashion tourniquet applied 250 mm mercury after Esmarch 1st starting on the left knee.  Midline incision was made sharp dissection made down extensor mechanism a standard medial parapatellar arthrotomy was performed medial fat pad was removed and a medial release performed in order to get him out of varus alignment and get good correction.  Anterior posterior cruciate ligaments removed mediolateral meniscal remnants removed.  A tibial cut was made perpendicular shaft tibia sizing was 79 attention turned to the femur with intramedullary guide a 3 degree provisional distal cut was performed.  Took another 2 mm off at that point extension gap was symmetric at 10 balanced well excellent alignment.  Sizing on the femur was 72.5 so with the tensor 1st in flexion anterior-posterior cuts performed with a 17.5.  Flexion gap was symmetric at 10 extension gaps symmetric at 10 so the chamber cuts performed with a floating trial 0-130 degrees range of motion rotation was marked tibia was punched prepatellar thickness 25 post patellar thickness 25 with a 40 patella.  The appropriate components opened using good cement technique all components were cemented excess cement was removed pulse lavage  irrigation was used.  Final final 10 mm AS insert placed.  Again excellent range of motion stability.  Permanent suture at the VMO.  1. Vicryl was used in the rest extensor mechanism post closure 0-130 degrees range of motion to 0 Vicryl subcutaneously staples on the skin.  Ropivacaine was instilled the soft tissues tourniquet released 59 minutes provisional dressing placed.      Right knee performed the same way the patella pre thickness was 20 post patellar thickness 25 all the sizing everything else was the same.  Tourniquet released at 65 minutes on the right.    Patient was brought to recovery room sterile fashion.    Significant Surgical Tasks Conducted by the Assistant(s), if Applicable:  Retraction instrumentation    Estimated Blood Loss (EBL): * No values recorded between 7/25/2023  7:23 AM and 7/25/2023  9:20 AM * 59/65 59/65           Implants:   Implant Name Type Inv. Item Serial No.  Lot No. LRB No. Used Action   PALACOS PRO READY TO MIX BONE CEMENT    [employer] [id number] Left 1 Implanted   PATELLA [id number] [id number] - [id number]  PATELLA [id number]  [employer] [id number] Left 1 Implanted   TIBIAL TRAY CRUCIATE 79MM - [id number]  TIBIAL TRAY CRUCIATE 79MM  [employer] [id number] Left 1 Implanted   BEARING ARCOM TIB [id number] - [id number]  BEARING ARCOM TIB [id number]  [employer] [id number] Left 1 Implanted   CEMENTED CR FEMORAL    [employer] [id number] Left 1 Implanted   PALACOS PRO READY TO MIX BONE CEMENT    [employer] [id number] Right 1 Implanted   TIBIAL TRAY CRUCIATE 79MM - [id number]  TIBIAL TRAY CRUCIATE 79MM  [employer] [id number] Right 1 Implanted   BEARING ARCOM TIB [id number] - [id number]  BEARING ARCOM TIB [id number]  [employer] [id number] Right 1 Implanted   PATELLA [id number] 48J00OH - [id number]  PATELLA [id number] [id number]  [employer] [id number] Right 1 Implanted   CEMENTED CR FEMORAL     [employer] [id number] Right 1 Implanted       Specimens:   Specimen (24h  ago, onward)      None                    Condition: Good    Disposition: PACU - hemodynamically stable.    Attestation: I was present and scrubbed for the entire procedure.

## 2023-07-25 NOTE — ANESTHESIA PROCEDURE NOTES
Spinal    Diagnosis: Osteo knee  Patient location during procedure: holding area  Start time: 7/25/2023 6:40 AM  Timeout: 7/25/2023 6:40 AM  End time: 7/25/2023 6:48 AM    Staffing  Authorizing Provider: Abel Amezcua MD  Performing Provider: Abel Amezcua MD    Preanesthetic Checklist  Completed: patient identified, IV checked, site marked, risks and benefits discussed, surgical consent, monitors and equipment checked, pre-op evaluation and timeout performed  Spinal Block  Patient position: sitting  Prep: ChloraPrep  Patient monitoring: heart rate, cardiac monitor, continuous pulse ox and frequent blood pressure checks  Approach: midline  Location: L2-3  Injection technique: single shot  CSF Fluid: clear free-flowing CSF  Needle  Needle type: pencil-tip   Needle gauge: 22 G  Needle length: 3.5 in  Additional Documentation: incremental injection, negative aspiration for heme and no paresthesia on injection  Needle localization: anatomical landmarks  Assessment  Sensory level: T5   Dermatomal levels determined by alcohol wipe  Ease of block: easy  Patient's tolerance of the procedure: comfortable throughout block and no complaints  Medications:    Medications: ropivacaine (NAROPIN) injection 0.5% - Intraspinal   3 mL - 7/25/2023 6:46:00 AM

## 2023-07-25 NOTE — PT/OT/SLP PROGRESS
Physical Therapy Treatment    Patient Name:  Mao Pearce   MRN:  [id number]    Recommendations:     Discharge Recommendations: nursing facility, skilled  Discharge Equipment Recommendations: walker, rolling, bedside commode, bath bench  Barriers to discharge: Decreased caregiver support    Assessment:     Mao Pearce is a 77 y.o. male admitted with a medical diagnosis of Bilateral primary osteoarthritis of knee.  He presents with the following impairments/functional limitations: weakness, impaired endurance, impaired self care skills, impaired functional mobility, gait instability, impaired balance, decreased coordination, decreased lower extremity function, pain, decreased ROM, impaired skin, edema, orthopedic precautions ;pt with fair/good mobility today, limited by pain at this time, 10/10. Only able to amb. 10' around bed w/ RW and min.A, poor posture noted, dec step length.    Rehab Prognosis: Good; patient would benefit from acute skilled PT services to address these deficits and reach maximum level of function.    Recent Surgery: Procedure(s) (LRB):  ARTHROPLASTY, KNEE, TOTAL REPLACEMENT (Bilateral) Day of Surgery    Plan:     During this hospitalization, patient to be seen BID to address the identified rehab impairments via gait training, therapeutic activities, therapeutic exercises, neuromuscular re-education and progress toward the following goals:    Plan of Care Expires:  08/08/23    Subjective     Chief Complaint: pain in B knees  Patient/Family Comments/goals: pt agreeable to session, though having a lot of pain at this time, and c/o nausea.  Pain/Comfort:  Pain Rating 1: 10/10  Location - Side 1: Bilateral  Location - Orientation 1: generalized  Location 1: knee  Pain Addressed 1: Pre-medicate for activity, Reposition, Distraction, Cessation of Activity, Nurse notified  Pain Rating Post-Intervention 1: 10/10      Objective:     Communicated with nurse prior to session.  Patient found up in  chair with peripheral IV, cryotherapy, FCD upon PT entry to room.     General Precautions: Standard, fall  Orthopedic Precautions: LLE weight bearing as tolerated, RLE weight bearing as tolerated  Braces: N/A  Respiratory Status: Room air     Functional Mobility:  Bed Mobility:     Sit to Supine: moderate assistance, maximal assistance, and at LE's  Transfers:     Sit to Stand:  minimum assistance and moderate assistance with rolling walker  Gait: amb'd 10' w/ RW and min.A, cuieng for upright posture, inc step length, WBAT on BLE's.       AM-PAC 6 CLICK MOBILITY  Turning over in bed (including adjusting bedclothes, sheets and blankets)?: 3  Sitting down on and standing up from a chair with arms (e.g., wheelchair, bedside commode, etc.): 3  Moving from lying on back to sitting on the side of the bed?: 3  Moving to and from a bed to a chair (including a wheelchair)?: 3  Need to walk in hospital room?: 3  Climbing 3-5 steps with a railing?: 2  Basic Mobility Total Score: 17       Treatment & Education:  Pt inst'd in AP's, QS, heelslides x 10 ea.   Applied CPM to LLE, set -5 to 40* flexion.   Nsg. To apply to RLE this evening for 2 hours (nsg. Educated on set up and use).     Patient left HOB elevated with all lines intact, call button in reach, and nurse present..    GOALS:   Multidisciplinary Problems       Physical Therapy Goals          Problem: Physical Therapy    Goal Priority Disciplines Outcome Goal Variances Interventions   Physical Therapy Goal     PT, PT/OT Ongoing, Progressing     Description: Goals to be met by: 23    Patient will increase functional independence with mobility by performin. Supine to sit with supervision.   2. Sit to supine with supervision.   3. Sit<>stand transfer with supervision using rolling walker.   4. Gait > 150 feet with SBA using rolling walker.   5. Verbalize TKA precautions without verbal cues.                           Time Tracking:     PT Received On: 23  PT  Start Time: 1519     PT Stop Time: 1557  PT Total Time (min): 38 min     Billable Minutes: Gait Training 15, Therapeutic Activity 15, and Therapeutic Exercise 8    Treatment Type: Treatment  PT/PTA: PTA     Number of PTA visits since last PT visit: 0     07/25/2023

## 2023-07-25 NOTE — PT/OT/SLP EVAL
Physical Therapy Evaluation    Patient Name:  Mao Pearce   MRN:  [id number]    Recommendations:     Discharge Recommendations: nursing facility, skilled   Discharge Equipment Recommendations: walker, rolling, bath bench, bedside commode   Barriers to discharge: Decreased caregiver support and functional mobility impairments    Assessment:     Mao Pearce is a 77 y.o. male admitted with a medical diagnosis of Bilateral primary osteoarthritis of knee; he is s/p bilateral TKA.  He presents with the following impairments/functional limitations: weakness, impaired endurance, impaired self care skills, impaired functional mobility, gait instability, impaired balance, decreased lower extremity function, pain, decreased ROM, orthopedic precautions, impaired joint extensibility.    PT orders received. PT evaluation completed and goals/POC established. Pt tolerated evaluation well with no adverse reactions. Pt performed bed mobility, transfers, and ambulation x 20 ft with CGA and rolling walker. Bilateral knee flexion/extension AROM = 5 deg (lacking full extension) to 90 deg. Educated patient on TKA precautions, safe use of rolling walker, positioning after knee replacement (no pillow under knee), importance of mobility, and PT plan of care. Pt verbalized understanding. Pt will benefit from skilled PT services to address impairments and functional limitations.    Rehab Prognosis: Good; patient would benefit from acute skilled PT services to address these deficits and reach maximum level of function.    Recent Surgery: Procedure(s) (LRB):  ARTHROPLASTY, KNEE, TOTAL REPLACEMENT (Bilateral) Day of Surgery    Plan:     During this hospitalization, patient to be seen BID to address the identified rehab impairments via gait training, therapeutic activities, therapeutic exercises, neuromuscular re-education and progress toward the following goals:    Plan of Care Expires:  08/08/23    Subjective     Chief Complaint:  bilateral knee pain  Patient/Family Comments/goals: Pt agreeable to therapy evaluation after encouragement / education   Pain/Comfort:  Pain Rating 1: 7/10  Location - Side 1: Bilateral  Location - Orientation 1: generalized  Location 1: knee  Pain Addressed 1: Pre-medicate for activity, Reposition, Distraction, Cessation of Activity    Patients cultural, spiritual, Jewish conflicts given the current situation: no    Living Environment:  Pt lives alone in a condo with elevator access. He has access to a tub-shower combination in the condo. Prior to admission, patients level of function was independent and ambulatory.  Equipment used at home: none.  DME owned (not currently used):  crutches .  Upon discharge, patient will have no assistance.    Objective:     Communicated with XIOMARA Garcia prior to session.  Patient found HOB elevated with peripheral IV, FCD, cryotherapy  upon PT entry to room.    General Precautions: Standard, fall  Orthopedic Precautions:LLE weight bearing as tolerated, RLE weight bearing as tolerated   Braces: N/A  Respiratory Status: Room air    Exams:  Cognition:   Patient is oriented to name, , date, place, situation.  Pt follows approximately 100% of one step commands.    Mood: Pleasant and cooperative.   Musculoskeletal:  Posture: Protective guarding surgical joints  LE ROM/Strength: AROM surgical knee difficult to accurately assess with large bulky dressing; bilateral knee flexion/extension AROM measured = 0 deg (lacking full extension) to 90 deg. Surgical knee strength grossly 5/5 knee flexion and 3-/5 knee extension.  Neuromuscular:  Sensation: Intact to light touch bilateral LEs. Pt denied paresthesias.   Coordination/Tone/Reflexes: No impairments identified with functional mobility. No formal testing performed.   Balance: contact guard assistance for dynamic standing with bilateral UE support.   Visual-vestibular: No impairments identified with functional mobility. No formal  testing performed.  Integument:  Visible skin intact and surgical extremity dressing clean and dry.   Cardiopulmonary:  Edema: Mild surgical extremity.    Color/temperature/pulses: WNL    Functional Mobility: Non-slip socks and gait belt donned prior to mobility.  Bed Mobility:     Supine to Sit: contact guard assistance with cues  Transfers:     Sit to Stand: minimum assistance of 2 persons with rolling walker  Pt required verbal cues for hand placement  Gait: 20 ft with rolling walker and contact guard assistance.  Pt required verbal cues for rolling walker management and step-through gait pattern.  Demonstrated gait deviations of decreased stride length with step to gait pattern, decreased surgical knee flexion, decreased surgical heel strike/toe off, and increased double limb support time.    Therapeutic Activities and Exercises:  Bed mobility, transfer, and gait training as described above.  Pt sat EOB for ~5 minutes with SBA - CGA and intermittent upper extremity support.     PT educated patient re:   PT plan of care/role of PT  Safety with OOB mobility  Use of RW  Positioning of LE and use of ice  Orthopedic precautions  Gait deviations  Pt verbalized understanding, however, needs reinforcement.     Patient left up in chair with all lines intact, call button in reach, and ortho navigator Mony present.    GOALS:   Multidisciplinary Problems       Physical Therapy Goals          Problem: Physical Therapy    Goal Priority Disciplines Outcome Goal Variances Interventions   Physical Therapy Goal     PT, PT/OT Ongoing, Progressing     Description: Goals to be met by: 23    Patient will increase functional independence with mobility by performin. Supine to sit with supervision.   2. Sit to supine with supervision.   3. Sit<>stand transfer with supervision using rolling walker.   4. Gait > 150 feet with SBA using rolling walker.   5. Verbalize TKA precautions without verbal cues.                            History:     Past Medical History:   Diagnosis Date    BPH (benign prostatic hyperplasia)     Hair loss     Hyperlipidemia     Osteoarthritis        Past Surgical History:   Procedure Laterality Date    HIP REPLACEMENT ARTHROPLASTY Left 5/7/2020    Procedure: ARTHROPLASTY, HIP REPLACEMENT;  Surgeon: Celestine Cox MD;  Location: [facility];  Service: Orthopedics;  Laterality: Left;    KNEE ARTHROSCOPY      TONSILLECTOMY         Time Tracking:     PT Received On: 07/25/23  PT Start Time: 1351     PT Stop Time: 1410  PT Total Time (min): 19 min     Billable Minutes: Evaluation 11 and Therapeutic Activity 8  Overlap with OT for portions of session due to complex nature of patient, tolerance for therapeutic modalities, and safety with mobility to decrease fall risk for patient and caregiver injury requiring two skilled therapists to provide interventions.      07/25/2023

## 2023-07-25 NOTE — PLAN OF CARE
Plan of care reviewed, pt verbalizes understanding.  Patient AAOX4. VSS on room air.  PRN pain medications administered per pain scale (see MAR) Dextrose 5% and 0.9% NS infusing at 75 ml/hr. CPM applied to RLE per Janey, PTA orders. CPM should be removed at 19:30.  Polar care filled with ice.  TB skin test administered in Left forearm. Safety maintained, bed in lowest position, call bell within reach, bed alarm set.  Will continue to monitor.

## 2023-07-25 NOTE — PLAN OF CARE
I certify I provided patient choice and a list to the patient of CMS rated SNF's. Patient signed Patient's Choice Disclosure Form choosing the following  1.[facility]   Referral forwarded via [facility] - secure chat to Livia to review

## 2023-07-25 NOTE — PLAN OF CARE
Nurses Note -- 4 Eyes      7/25/2023   12:19 PM      Skin assessed during: Transfer      [x] No Altered Skin Integrity Present    []Prevention Measures Documented      [] Yes- Altered Skin Integrity Present or Discovered   [] LDA Added if Not in Epic (Describe Wound)   [] New Altered Skin Integrity was Present on Admit and Documented in LDA   [] Wound Image Taken    Wound Care Consulted? No    Attending Nurse:  Radha Porter RN     Second RN/Staff Member:  Paola Puente LPN    Patient arrived to unit in room #348.  Patient is alert and oriented.  Dressing at bilateral knee C/D/I, ice packs in place. Due medications given.  2/10 pain noted at incision.  Diaz in place with clear yellow urine output.  Dextrose 5% and 0.9% NS infusing at 75 ml/hr.  Foot compressions on.  Patient safety maintained, bed in lowest position, call bell within reach, bed alarm set.  Will continue to monitor.

## 2023-07-25 NOTE — PLAN OF CARE
CLOSING ON LEFT KNEE START: 0805  SURGERY START RIGHT KNEE: 0805  LEFT KNEE CLOSED AND COVERED: 0825

## 2023-07-25 NOTE — PLAN OF CARE
Problem: Occupational Therapy  Goal: Occupational Therapy Goal  Description: OT goals to be met by 8/1/23.   1. Pt will complete toilet t/f with RW with SPV.  2. Pt will complete toileting with SPV.  3. Pt will complete LB dressing with adaptive equipment/technique as needed with Setup, adhering to TKA precautions.  4. Pt will complete g/h standing at sink with RW with SPV.   Outcome: Ongoing, Progressing     OT orders received and evaluation complete. POC/goals established. Pt completed sup>sit with CGA, sit>stand from elevated bed with Min A x 2 and RW, stand>sit to chair with CGA, and ambulation with RW with CGA. Pt needing Total A to don socks this date 2/2 pain. Recommend discharge to SNF to ensure return to PLOF for maximum independence and safety before return home as pt will have no assist. Recommend BSC, bath bench, and RW.

## 2023-07-25 NOTE — PLAN OF CARE
Problem: Physical Therapy  Goal: Physical Therapy Goal  Description: Goals to be met by: 23    Patient will increase functional independence with mobility by performin. Supine to sit with supervision.   2. Sit to supine with supervision.   3. Sit<>stand transfer with supervision using rolling walker.   4. Gait > 150 feet with SBA using rolling walker.   5. Verbalize TKA precautions without verbal cues.      Outcome: Ongoing, Progressing     PT orders received. PT evaluation completed and goals/POC established. Pt tolerated evaluation well with no adverse reactions. Pt performed bed mobility, transfers, and ambulation x 20 ft with CGA and rolling walker. Bilateral knee flexion/extension AROM = 5 deg (lacking full extension) to 90 deg. Educated patient on TKA precautions, safe use of rolling walker, positioning after knee replacement (no pillow under knee), importance of mobility, and PT plan of care. Pt verbalized understanding. Pt will benefit from skilled PT services to address impairments and functional limitations.

## 2023-07-25 NOTE — PT/OT/SLP EVAL
"Occupational Therapy   Evaluation    Name: Mao Pearce  MRN: [id number]  Admitting Diagnosis: Bilateral primary osteoarthritis of knee  Recent Surgery: Procedure(s) (LRB):  ARTHROPLASTY, KNEE, TOTAL REPLACEMENT (Bilateral) Day of Surgery    Recommendations:     Discharge Recommendations: nursing facility, skilled  Discharge Equipment Recommendations:  walker, rolling, bath bench, bedside commode  Barriers to discharge:  Decreased caregiver support    Assessment:     Mao Pearce is a 77 y.o. male with a medical diagnosis of Bilateral primary osteoarthritis of knee.  He presents with the following performance deficits affecting function: weakness, impaired endurance, impaired self care skills, impaired functional mobility, gait instability, impaired balance, decreased lower extremity function, pain, orthopedic precautions, decreased ROM, impaired joint extensibility.      OT orders received and evaluation complete. POC/goals established. Pt completed sup>sit with CGA, sit>stand from elevated bed with Min A x 2 and RW, stand>sit to chair with CGA, and ambulation with RW with CGA. Pt needing Total A to don socks this date 2/2 pain. Recommend discharge to SNF to ensure return to PLOF for maximum independence and safety before return home as pt will have no assist. Recommend BSC, bath bench, and RW.     Rehab Prognosis: Good; patient would benefit from acute skilled OT services to address these deficits and reach maximum level of function.       Plan:     Patient to be seen daily to address the above listed problems via self-care/home management, therapeutic activities, therapeutic exercises  Plan of Care Expires: 08/24/23  Plan of Care Reviewed with: patient    Subjective     Chief Complaint: pain  Patient/Family Comments/goals: "I feel like I'm going to throw up"    Occupational Profile:  Living Environment: Pt lives alone in a condo with elevator access. Pt has a tub/shower and standard toilet.   Previous " "level of function: Ind but "cautious"  Roles and Routines: rides exercise bike in gym, golfing  Equipment Used at Home: none  Assistance upon Discharge: none    Pain/Comfort:  Pain Rating 1: 7/10  Location - Side 1: Bilateral  Location 1: knee  Pain Addressed 1: Pre-medicate for activity, Reposition, Distraction, Cessation of Activity  Pain Rating Post-Intervention 1: 7/10    Patients cultural, spiritual, Oriental orthodox conflicts given the current situation: no    Objective:     Communicated with: PT Viviane prior to session.  Patient found HOB elevated with peripheral IV, cryotherapy, FCD upon OT entry to room.    General Precautions: Standard, fall  Orthopedic Precautions: LLE weight bearing as tolerated, RLE weight bearing as tolerated  Braces: N/A  Respiratory Status: Room air    Occupational Performance:    Bed Mobility:    Patient completed Supine to Sit with CGA, cues for technique    Functional Mobility/Transfers:  Sit>stand from raised bed with Min A x 2 with RW, VC for safe hand placement  Chair t/f with CGA, VC for safe hand placement, controlled descent  Functional Mobility: CGA 20 ft bed>chair with RW    Activities of Daily Living:  Continue to assess; Total A to don socks this date 2/2 pain; deferring other activities 2/2 pain/nausea    Cognitive/Visual Perceptual:  Cognitive/Psychosocial Skills:     -       Oriented to: Person, Place, Time, and Situation   -       Follows Commands/attention:Follows two-step commands  -       Communication: clear/fluent  -       Memory: No Deficits noted  -       Safety awareness/insight to disability: impaired   -       Mood/Affect/Coping skills/emotional control: Cooperative    Physical Exam:  Balance:    -       SPV static sitting; CGA static/dynamic standing with RW  Sensation:    -       Intact  light/touch B UE, denies paresthesias  Upper Extremity Range of Motion:     -       Right Upper Extremity: WNL  -       Left Upper Extremity: WNL  Upper Extremity Strength:  "   -       Right Upper Extremity: WNL  -       Left Upper Extremity: WNL   Strength:    -       Right Upper Extremity: WNL  -       Left Upper Extremity: WNL    AMPAC 6 Click ADL:  AMPAC Total Score: 18    Treatment & Education:  -Education on role of OT, POC, discharge recs, safe use of RW, TKA precautions  -ADL as detailed above with VC for safe use of RW, bed mobility and transfer technique, and TKA precautions during ADL    Patient left up in chair with all lines intact, call button in reach, and ortho navigator Mony present    GOALS:   Multidisciplinary Problems       Occupational Therapy Goals          Problem: Occupational Therapy    Goal Priority Disciplines Outcome Interventions   Occupational Therapy Goal     OT, PT/OT Ongoing, Progressing    Description: OT goals to be met by 8/1/23.   1. Pt will complete toilet t/f with RW with SPV.  2. Pt will complete toileting with SPV.  3. Pt will complete LB dressing with adaptive equipment/technique as needed with Setup, adhering to TKA precautions.  4. Pt will complete g/h standing at sink with RW with SPV.                        History:     Past Medical History:   Diagnosis Date    BPH (benign prostatic hyperplasia)     Hair loss     Hyperlipidemia     Osteoarthritis          Past Surgical History:   Procedure Laterality Date    HIP REPLACEMENT ARTHROPLASTY Left 5/7/2020    Procedure: ARTHROPLASTY, HIP REPLACEMENT;  Surgeon: Celestine Cox MD;  Location: [facility];  Service: Orthopedics;  Laterality: Left;    KNEE ARTHROSCOPY      TONSILLECTOMY         Time Tracking:     OT Date of Treatment: 07/25/23  OT Start Time: 1356  OT Stop Time: 1410  OT Total Time (min): 14 min    Billable Minutes:Evaluation 14    7/25/2023

## 2023-07-25 NOTE — ANESTHESIA PROCEDURE NOTES
Peripheral Block    Patient location during procedure: pre-op   Block not for primary anesthetic.  Reason for block: at surgeon's request and post-op pain management   Post-op Pain Location: bilat knee   Start time: 7/25/2023 6:26 AM  Timeout: 7/25/2023 6:24 AM   End time: 7/25/2023 6:36 AM    Staffing  Authorizing Provider: Yosi Miranda MD  Performing Provider: Yosi Miranda MD    Preanesthetic Checklist  Completed: patient identified, IV checked, site marked, risks and benefits discussed, surgical consent, monitors and equipment checked, pre-op evaluation and timeout performed  Peripheral Block  Patient position: supine  Prep: ChloraPrep  Patient monitoring: heart rate, cardiac monitor, continuous pulse ox, continuous capnometry and frequent blood pressure checks  Block type: adductor canal  Laterality: bilateral  Injection technique: single shot  Needle  Needle type: Stimuplex   Needle gauge: 21 G  Needle length: 4 in  Needle localization: anatomical landmarks and ultrasound guidance   -ultrasound image captured on disc.  Assessment  Injection assessment: negative aspiration, negative parasthesia and local visualized surrounding nerve  Paresthesia pain: none  Heart rate change: no  Slow fractionated injection: yes    Medications:    Medications: bupivacaine (pf) (MARCAINE) injection 0.25% - Perineural   30 mL - 7/25/2023 6:30:00 AM    Additional Notes  VSS.  [facility] RN monitoring vitals throughout procedure.  Patient tolerated procedure well.

## 2023-07-25 NOTE — PLAN OF CARE
I certify I provided patient choice and a list to the patient of CMS rated IRF's. Patient signed Patient's Choice Disclosure Form choosing the following  1.[facility]   Referral forwarded - Liana reviewing referral - awaiting post op therapy evals     07/25/23 1005   Post-Acute Status   Post-Acute Authorization Placement   Post-Acute Placement Status Referrals Sent   Patient choice form signed by patient/caregiver List from System Post-Acute Care   Discharge Delays None known at this time   Discharge Plan   Discharge Plan A Rehab

## 2023-07-26 LAB
ANION GAP SERPL CALC-SCNC: 8 MMOL/L (ref 8–16)
BASOPHILS # BLD AUTO: 0.02 K/UL (ref 0–0.2)
BASOPHILS NFR BLD: 0.2 % (ref 0–1.9)
BUN SERPL-MCNC: 10 MG/DL (ref 8–23)
CALCIUM SERPL-MCNC: 8.7 MG/DL (ref 8.7–10.5)
CHLORIDE SERPL-SCNC: 102 MMOL/L (ref 95–110)
CO2 SERPL-SCNC: 23 MMOL/L (ref 23–29)
CREAT SERPL-MCNC: 0.7 MG/DL (ref 0.5–1.4)
DIFFERENTIAL METHOD: ABNORMAL
EOSINOPHIL # BLD AUTO: 0 K/UL (ref 0–0.5)
EOSINOPHIL NFR BLD: 0 % (ref 0–8)
ERYTHROCYTE [DISTWIDTH] IN BLOOD BY AUTOMATED COUNT: 11.8 % (ref 11.5–14.5)
EST. GFR  (NO RACE VARIABLE): >60 ML/MIN/1.73 M^2
GLUCOSE SERPL-MCNC: 160 MG/DL (ref 70–110)
HCT VFR BLD AUTO: 37.3 % (ref 40–54)
HGB BLD-MCNC: 13.2 G/DL (ref 14–18)
IMM GRANULOCYTES # BLD AUTO: 0.02 K/UL (ref 0–0.04)
IMM GRANULOCYTES NFR BLD AUTO: 0.2 % (ref 0–0.5)
LYMPHOCYTES # BLD AUTO: 0.9 K/UL (ref 1–4.8)
LYMPHOCYTES NFR BLD: 9.6 % (ref 18–48)
MCH RBC QN AUTO: 31.9 PG (ref 27–31)
MCHC RBC AUTO-ENTMCNC: 35.4 G/DL (ref 32–36)
MCV RBC AUTO: 90 FL (ref 82–98)
MONOCYTES # BLD AUTO: 1.2 K/UL (ref 0.3–1)
MONOCYTES NFR BLD: 13.2 % (ref 4–15)
NEUTROPHILS # BLD AUTO: 7.1 K/UL (ref 1.8–7.7)
NEUTROPHILS NFR BLD: 76.8 % (ref 38–73)
NRBC BLD-RTO: 0 /100 WBC
PLATELET # BLD AUTO: 88 K/UL (ref 150–450)
PMV BLD AUTO: 11.9 FL (ref 9.2–12.9)
POTASSIUM SERPL-SCNC: 3.7 MMOL/L (ref 3.5–5.1)
RBC # BLD AUTO: 4.14 M/UL (ref 4.6–6.2)
SARS-COV-2 RDRP RESP QL NAA+PROBE: NEGATIVE
SODIUM SERPL-SCNC: 133 MMOL/L (ref 136–145)
WBC # BLD AUTO: 9.21 K/UL (ref 3.9–12.7)

## 2023-07-26 PROCEDURE — 97116 GAIT TRAINING THERAPY: CPT | Mod: CQ

## 2023-07-26 PROCEDURE — U0002 COVID-19 LAB TEST NON-CDC: HCPCS | Performed by: ORTHOPAEDIC SURGERY

## 2023-07-26 PROCEDURE — 63600175 PHARM REV CODE 636 W HCPCS: Performed by: ORTHOPAEDIC SURGERY

## 2023-07-26 PROCEDURE — 97110 THERAPEUTIC EXERCISES: CPT | Mod: CQ

## 2023-07-26 PROCEDURE — 94799 UNLISTED PULMONARY SVC/PX: CPT

## 2023-07-26 PROCEDURE — 94761 N-INVAS EAR/PLS OXIMETRY MLT: CPT

## 2023-07-26 PROCEDURE — 80048 BASIC METABOLIC PNL TOTAL CA: CPT | Performed by: ORTHOPAEDIC SURGERY

## 2023-07-26 PROCEDURE — 97535 SELF CARE MNGMENT TRAINING: CPT

## 2023-07-26 PROCEDURE — 97530 THERAPEUTIC ACTIVITIES: CPT | Mod: CQ

## 2023-07-26 PROCEDURE — 85025 COMPLETE CBC W/AUTO DIFF WBC: CPT | Performed by: ORTHOPAEDIC SURGERY

## 2023-07-26 PROCEDURE — 25000003 PHARM REV CODE 250: Performed by: ORTHOPAEDIC SURGERY

## 2023-07-26 PROCEDURE — 25000003 PHARM REV CODE 250: Performed by: NURSE PRACTITIONER

## 2023-07-26 PROCEDURE — 36415 COLL VENOUS BLD VENIPUNCTURE: CPT | Performed by: ORTHOPAEDIC SURGERY

## 2023-07-26 RX ORDER — IBUPROFEN 600 MG/1
600 TABLET ORAL 3 TIMES DAILY
Status: CANCELLED | OUTPATIENT
Start: 2023-07-26

## 2023-07-26 RX ORDER — MORPHINE SULFATE 10 MG/ML
5 INJECTION INTRAMUSCULAR; INTRAVENOUS; SUBCUTANEOUS ONCE
Status: COMPLETED | OUTPATIENT
Start: 2023-07-26 | End: 2023-07-26

## 2023-07-26 RX ADMIN — OXYCODONE AND ACETAMINOPHEN 1 TABLET: 10; 325 TABLET ORAL at 09:07

## 2023-07-26 RX ADMIN — MORPHINE SULFATE 4 MG: 4 INJECTION, SOLUTION INTRAMUSCULAR; INTRAVENOUS at 07:07

## 2023-07-26 RX ADMIN — OXYCODONE AND ACETAMINOPHEN 1 TABLET: 10; 325 TABLET ORAL at 01:07

## 2023-07-26 RX ADMIN — ASPIRIN 81 MG CHEWABLE TABLET 81 MG: 81 TABLET CHEWABLE at 08:07

## 2023-07-26 RX ADMIN — FINASTERIDE 5 MG: 5 TABLET, FILM COATED ORAL at 09:07

## 2023-07-26 RX ADMIN — OXYCODONE AND ACETAMINOPHEN 1 TABLET: 10; 325 TABLET ORAL at 05:07

## 2023-07-26 RX ADMIN — PREGABALIN 75 MG: 75 CAPSULE ORAL at 08:07

## 2023-07-26 RX ADMIN — CEFAZOLIN 2 G: 2 INJECTION, POWDER, FOR SOLUTION INTRAMUSCULAR; INTRAVENOUS at 12:07

## 2023-07-26 RX ADMIN — MUPIROCIN: 20 OINTMENT TOPICAL at 09:07

## 2023-07-26 RX ADMIN — ATORVASTATIN CALCIUM 40 MG: 20 TABLET, FILM COATED ORAL at 08:07

## 2023-07-26 RX ADMIN — ONDANSETRON 8 MG: 8 TABLET, ORALLY DISINTEGRATING ORAL at 07:07

## 2023-07-26 RX ADMIN — DEXTROSE AND SODIUM CHLORIDE: 5; 900 INJECTION, SOLUTION INTRAVENOUS at 03:07

## 2023-07-26 RX ADMIN — MUPIROCIN: 20 OINTMENT TOPICAL at 08:07

## 2023-07-26 RX ADMIN — ASPIRIN 81 MG CHEWABLE TABLET 81 MG: 81 TABLET CHEWABLE at 09:07

## 2023-07-26 RX ADMIN — PREGABALIN 75 MG: 75 CAPSULE ORAL at 09:07

## 2023-07-26 RX ADMIN — MORPHINE SULFATE 4 MG: 4 INJECTION, SOLUTION INTRAMUSCULAR; INTRAVENOUS at 04:07

## 2023-07-26 RX ADMIN — MORPHINE SULFATE 5 MG: 10 INJECTION INTRAVENOUS at 12:07

## 2023-07-26 RX ADMIN — FAMOTIDINE 20 MG: 20 TABLET, FILM COATED ORAL at 09:07

## 2023-07-26 NOTE — NURSING
Surgical dressing at Right knee became saturated with bloody drainage during physical therapy.  Dressing removed and replaced with gauze, Abd pads and ace wrap.  Staples noted and surgical incision intact.  Will continue to monitor.

## 2023-07-26 NOTE — PLAN OF CARE
07/26/23 0845   Post-Acute Status   Post-Acute Authorization Placement   Post-Acute Placement Status Referrals Sent   Discharge Delays (!) Post-Acute Set-up   Discharge Plan   Discharge Plan A Skilled Nursing Facility   Discharge Plan B Home Health     ROSY contacted [facility] concerning the patient discharge placement. ROSY was informed that [facility] may have beds Thursday 7/27/23 or Friday 7/28/23

## 2023-07-26 NOTE — PT/OT/SLP PROGRESS
Physical Therapy Treatment    Patient Name:  Mao Pearce   MRN:  [id number]    Recommendations:     Discharge Recommendations: nursing facility, skilled  Discharge Equipment Recommendations: bedside commode, bath bench, walker, rolling, to be determined by next level of care  Barriers to discharge: Decreased caregiver support    Assessment:     Mao Pearce is a 77 y.o. male admitted with a medical diagnosis of Bilateral primary osteoarthritis of knee.  He presents with the following impairments/functional limitations: weakness, impaired endurance, impaired self care skills, impaired functional mobility, gait instability, impaired balance, decreased lower extremity function, decreased safety awareness, pain, decreased ROM, impaired skin, edema, orthopedic precautions ;pt with fair/good mobility this afternoon, sat up in chair ~1 1/2 hrs., though still limited by pain, difficulty w/ sit to stand t/f's, req'd maxA x 2. .    Rehab Prognosis: Good; patient would benefit from acute skilled PT services to address these deficits and reach maximum level of function.    Recent Surgery: Procedure(s) (LRB):  ARTHROPLASTY, KNEE, TOTAL REPLACEMENT (Bilateral) 1 Day Post-Op    Plan:     During this hospitalization, patient to be seen BID to address the identified rehab impairments via gait training, therapeutic activities, therapeutic exercises, neuromuscular re-education and progress toward the following goals:    Plan of Care Expires:  08/08/23    Subjective     Chief Complaint: pain  Patient/Family Comments/goals: pt agreeable to session.   Pain/Comfort:  Pain Rating 1:  (pt did not rate, though assuming 10/10)  Location - Side 1: Bilateral  Location - Orientation 1: generalized  Location 1: knee (R>L)  Pain Addressed 1: Pre-medicate for activity, Reposition, Distraction, Cessation of Activity, Nurse notified  Pain Rating Post-Intervention 1:  (pt appeared in less pain at rest following session)      Objective:  "    Communicated with nurse prior to session.  Patient found up in chair with cryotherapy, peripheral IV upon PT entry to room.     General Precautions: Standard, fall  Orthopedic Precautions: RLE weight bearing as tolerated, LLE weight bearing as tolerated  Braces: N/A  Respiratory Status: Room air     Functional Mobility:  Bed Mobility:     Sit to Supine: moderate assistance, maximal assistance, and at LE's  Transfers:     Sit to Stand:  maximal assistance and of 2 persons with rolling walker  Gait: amb'd 60' w/ RW and min.A, WBAT on BLE's, chair following for safety.cueing for upright posture and inc step length. Pt w/ step to gt pattern initially, then able to perform slight step through.       AM-PAC 6 CLICK MOBILITY  Turning over in bed (including adjusting bedclothes, sheets and blankets)?: 3  Sitting down on and standing up from a chair with arms (e.g., wheelchair, bedside commode, etc.): 2  Moving from lying on back to sitting on the side of the bed?: 2  Moving to and from a bed to a chair (including a wheelchair)?: 2  Need to walk in hospital room?: 3  Climbing 3-5 steps with a railing?: 1  Basic Mobility Total Score: 13       Treatment & Education:  Co-tx w/ OT for session, req'd maxA x 2 to stand from chair, posterior LOB noted which req'd max A to recover. Cueing for upright posture needed.   Pt perf'd seated AAROM LAQ"s x 10 ea. , reviewed AP's, QS with pt.   L knee w/ ~80* seated knee flexion. (Did not test RLE 2/2 bleeding issues).      Patient left HOB elevated with all lines intact, call button in reach, nurse notified, and cryotherapy to knees ..    GOALS:   Multidisciplinary Problems       Physical Therapy Goals          Problem: Physical Therapy    Goal Priority Disciplines Outcome Goal Variances Interventions   Physical Therapy Goal     PT, PT/OT Ongoing, Progressing     Description: Goals to be met by: 23    Patient will increase functional independence with mobility by performin. " Supine to sit with supervision.   2. Sit to supine with supervision.   3. Sit<>stand transfer with supervision using rolling walker.   4. Gait > 150 feet with SBA using rolling walker.   5. Verbalize TKA precautions without verbal cues.                           Time Tracking:     PT Received On: 07/26/23  PT Start Time: 1336     PT Stop Time: 1404  PT Total Time (min): 28 min     Billable Minutes: Gait Training 18 and Therapeutic Activity 10    Treatment Type: Treatment  PT/PTA: PTA     Number of PTA visits since last PT visit: 2     07/26/2023

## 2023-07-26 NOTE — PROGRESS NOTES
MED  Progress Note    Admit Date: 7/25/2023   LOS: 0 days     SUBJECTIVE:     Follow-up For:  Bilateral primary osteoarthritis of knee    Interval History:     Issues with pain control noted.  Working with therapy.  No CP/SOB/Calf pain.      Review of Systems:  Constitutional: No fever or chills  Respiratory: No cough or shortness of breath  Cardiovascular: No chest pain or palpitations  Gastrointestinal: No nausea or vomiting  Neurological: No confusion or weakness    OBJECTIVE:     Vital Signs Range (Last 24H):  BP (!) 155/81 (BP Location: Left arm, Patient Position: Lying)   Pulse 90   Temp 98.1 °F (36.7 °C) (Oral)   Resp 16   Ht 6' (1.829 m)   Wt 102.5 kg (226 lb)   SpO2 95%   BMI 30.65 kg/m²     Temp:  [97.5 °F (36.4 °C)-98.5 °F (36.9 °C)]   Pulse:  [58-97]   Resp:  [16-20]   BP: ()/(52-91)   SpO2:  [92 %-99 %]     I & O (Last 24H):  Intake/Output Summary (Last 24 hours) at 7/26/2023 0916  Last data filed at 7/26/2023 0552  Gross per 24 hour   Intake 950 ml   Output 1350 ml   Net -400 ml       Physical Exam:  General appearance: Well developed, well nourished  Eyes:  Conjunctivae/corneas clear. PERRL.  Lungs: Normal respiratory effort,   clear to auscultation bilaterally   Heart: Regular rate and rhythm, S1, S2 normal, no murmur, rub or nacho.  Abdomen: Soft, non-tender non-distended; bowel sounds normal; no masses,  no organomegaly  Extremities: No cyanosis or clubbing. No edema.    Skin: Skin color, texture, turgor normal. No rashes or lesions  Neurologic: Normal strength and tone. No focal numbness or weakness   Olivier knee CDI    Laboratory Data:  Recent Labs   Lab 07/26/23  0438   WBC 9.21   RBC 4.14*   HGB 13.2*   HCT 37.3*   PLT 88*   MCV 90   MCH 31.9*   MCHC 35.4       BMP:   Recent Labs   Lab 07/26/23 0438   *   *   K 3.7      CO2 23   BUN 10   CREATININE 0.7   CALCIUM 8.7     Lab Results   Component Value Date    CALCIUM 8.7 07/26/2023       Lab Results   Component  Value Date    CALCIUM 8.7 07/26/2023       No results found for: URICACID    BNP  No results for input(s): BNP, BNPTRIAGEBLO in the last 168 hours.    Medications:  Medication list was reviewed and changes noted under Assessment/Plan.    Diagnostic Results:        ASSESSMENT/PLAN:     Olivier Knee:  per ortho/therapy teams.     BPH:  proscar and monitored post anesthesia voiding.   Doing ok so far.      Lipids:  statin.     DVT:  ASA BID.     Pain control:  defer to ortho.        As above  Monitor progress today/pain mgmt.

## 2023-07-26 NOTE — NURSING
Patient had bilateral knee replacement done with Dr. Cox and complaining of pain 10/10 that has not been relieved with his current pain regimen to bilateral knees. He says it feels like a burning sensation to both knees. He's requesting and increase in his IV morphine. Spoke with Dr. Downing and received telephone order for one time dose of morphine 5 mg IV. Med administered and patient tolerated  it well.

## 2023-07-26 NOTE — NURSING
Surgical dressing to bilateral knees removed. Staples noted and surgical incision edges approximated. No redness or swelling noted. Scant sanguinous drainage noted to right knee incision. Mepore island dressings applied to bilateral knees along with BRENT hose and polar ice machine. Ice machine full. CPM applied to left leg at 6 am.  Patient tolerated dressing change well. Idaz catheter removed and patient given urinal. Bed in low position and side rails up x 2. Belongings and call light placed within reach of patient.

## 2023-07-26 NOTE — PT/OT/SLP PROGRESS
"Physical Therapy Treatment    Patient Name:  Mao Pearce   MRN:  [id number]    Recommendations:     Discharge Recommendations: nursing facility, skilled  Discharge Equipment Recommendations: bedside commode, bath bench, walker, rolling, to be determined by next level of care  Barriers to discharge: Decreased caregiver support (pt lives alone w/ no outside help avail)    Assessment:     Mao Pearce is a 77 y.o. male admitted with a medical diagnosis of Bilateral primary osteoarthritis of knee.  He presents with the following impairments/functional limitations: weakness, impaired endurance, impaired self care skills, impaired functional mobility, gait instability, impaired balance, decreased coordination, decreased lower extremity function, decreased safety awareness, pain, decreased ROM, impaired skin, edema, orthopedic precautions, impaired cardiopulmonary response to activity ;pt still having bleeding issues on RLE incision. After securing w/ ABD pad and ACE wrap, pt able to stand and take a few steps w/ RW, still limited by pain.    Rehab Prognosis: Good; patient would benefit from acute skilled PT services to address these deficits and reach maximum level of function.    Recent Surgery: Procedure(s) (LRB):  ARTHROPLASTY, KNEE, TOTAL REPLACEMENT (Bilateral) 1 Day Post-Op    Plan:     During this hospitalization, patient to be seen BID to address the identified rehab impairments via gait training, therapeutic activities, therapeutic exercises, neuromuscular re-education and progress toward the following goals:    Plan of Care Expires:  08/08/23    Subjective     Chief Complaint: pain  Patient/Family Comments/goals: pt agreeable to session, "I'm screwed. I can't move my legs".   Pain/Comfort:  Pain Rating 1: 10/10  Location - Side 1: Bilateral  Location - Orientation 1: generalized  Location 1: knee  Pain Addressed 1: Pre-medicate for activity, Reposition, Distraction, Nurse notified, Cessation of " "Activity (pt had oral pain meds prior to session)  Pain Rating Post-Intervention 1: 10/10      Objective:     Communicated with nurse prior to session.  Patient found HOB elevated with peripheral IV, cryotherapy, CPM (CPM to RLE) upon PT entry to room.     General Precautions: Standard, fall  Orthopedic Precautions: LLE weight bearing as tolerated, RLE weight bearing as tolerated  Braces: N/A  Respiratory Status: Room air     Functional Mobility:  Bed Mobility:     Supine to Sit: maximal assistance and at LE's  Sit to Supine: maximal assistance and at LE's  Transfers:     Sit to Stand:  maximal assistance and from elevated EOB with rolling walker  Gait: amb'd 5' w/ RW and min.A, close chair follow for safety. Pt became dizzy, ret'd to chair , BP:126/72      AM-PAC 6 CLICK MOBILITY  Turning over in bed (including adjusting bedclothes, sheets and blankets)?: 3  Sitting down on and standing up from a chair with arms (e.g., wheelchair, bedside commode, etc.): 2  Moving from lying on back to sitting on the side of the bed?: 2  Moving to and from a bed to a chair (including a wheelchair)?: 2  Need to walk in hospital room?: 2  Climbing 3-5 steps with a railing?: 1  Basic Mobility Total Score: 12       Treatment & Education:  Pt found supine w/ CPM on LLE again, remv'd coldpacks and bleeding noted again on RLE incision. Nsg. Called into room, secured w/ abd pad and ace wrap. Pt able to stand and take a few steps without bleeding isssues. Pt also perf'd seated AAROM LAQ"s , heelslides x 10 reps ea., no flexion perf'd on RLE 2/2 bleeding.       Patient left up in chair with all lines intact, call button in reach, nurse notified, and LE's elevated w/ cryotherapy to knees ..    GOALS:   Multidisciplinary Problems       Physical Therapy Goals          Problem: Physical Therapy    Goal Priority Disciplines Outcome Goal Variances Interventions   Physical Therapy Goal     PT, PT/OT Ongoing, Progressing     Description: Goals to be " met by: 23    Patient will increase functional independence with mobility by performin. Supine to sit with supervision.   2. Sit to supine with supervision.   3. Sit<>stand transfer with supervision using rolling walker.   4. Gait > 150 feet with SBA using rolling walker.   5. Verbalize TKA precautions without verbal cues.                           Time Tracking:     PT Received On: 23  PT Start Time: 1120     PT Stop Time: 1205  PT Total Time (min): 45 min     Billable Minutes: Gait Training 15 and Therapeutic Activity 30    Treatment Type: Treatment  PT/PTA: PTA     Number of PTA visits since last PT visit: 2     2023

## 2023-07-26 NOTE — PLAN OF CARE
Plan of care reviewed, pt verbalizes understanding.  Patient AAOX4.  Due medications given.  Pain controlled with PRN pain medication (See MAR).  Dressing at Bilateral Knees remain intact with Polar care in place.  Purposeful rounding complete throughout shift.  Safety maintained, bed in lowest position, call bell within reach, bed alarm set.  Will continue to monitor.

## 2023-07-26 NOTE — PT/OT/SLP PROGRESS
Physical Therapy Treatment    Patient Name:  Mao Pearce   MRN:  [id number]    Recommendations:     Discharge Recommendations: nursing facility, skilled  Discharge Equipment Recommendations: bedside commode, bath bench, walker, rolling, to be determined by next level of care  Barriers to discharge: Decreased caregiver support    Assessment:     Mao Pearce is a 77 y.o. male admitted with a medical diagnosis of Bilateral primary osteoarthritis of knee.  He presents with the following impairments/functional limitations: weakness, impaired endurance, impaired functional mobility, impaired self care skills, gait instability, impaired balance, decreased coordination, decreased lower extremity function, decreased safety awareness, pain, decreased ROM, edema, impaired skin, orthopedic precautions ;pt with limited mobility this AM, having 10/10 pain in knees and drainage from R knee incision, as well as low BP w/ standing.    Rehab Prognosis: Good; patient would benefit from acute skilled PT services to address these deficits and reach maximum level of function.    Recent Surgery: Procedure(s) (LRB):  ARTHROPLASTY, KNEE, TOTAL REPLACEMENT (Bilateral) 1 Day Post-Op    Plan:     During this hospitalization, patient to be seen BID to address the identified rehab impairments via gait training, therapeutic activities, therapeutic exercises, neuromuscular re-education and progress toward the following goals:    Plan of Care Expires:  08/08/23    Subjective     Chief Complaint: pain  Patient/Family Comments/goals: pt agreeable to session, though c/o extreme pain. Nsg. Reports he had morphine at 7:00 this morning, as well as overnight at 2am.   Pain/Comfort:  Pain Rating 1: 10/10  Location - Side 1: Bilateral  Location - Orientation 1: generalized  Location 1: knee  Pain Addressed 1: Pre-medicate for activity, Reposition, Distraction, Cessation of Activity, Nurse notified  Pain Rating Post-Intervention 1:  10/10      Objective:     Communicated with nurse prior to session.  Patient found HOB elevated with peripheral IV, FCD, cryotherapy, CPM (CPM to LLE) upon PT entry to room.     General Precautions: Standard, fall  Orthopedic Precautions: LLE weight bearing as tolerated, RLE weight bearing as tolerated  Braces: N/A  Respiratory Status: Room air     Functional Mobility:  Bed Mobility:     Scooting: minimum assistance and in seated position  Supine to Sit: maximal assistance and at LE's and cueing for trunk  Sit to Supine: maximal assistance and at LE's  Transfers:     Sit to Stand:  maximal assistance and of 1 persons with rolling walker  Gait: pt unable to take steps at this time, stood ~:10 sec. Before needing to sit      AM-PAC 6 CLICK MOBILITY  Turning over in bed (including adjusting bedclothes, sheets and blankets)?: 3  Sitting down on and standing up from a chair with arms (e.g., wheelchair, bedside commode, etc.): 2  Moving from lying on back to sitting on the side of the bed?: 2  Moving to and from a bed to a chair (including a wheelchair)?: 2  Need to walk in hospital room?: 2  Climbing 3-5 steps with a railing?: 1  Basic Mobility Total Score: 12       Treatment & Education:  Pt found w/ CPM on RLE (did 2 hours on LLE earlier this AM). Remv'd coldpack and bleeding noted through dressing/stocking. Nsg. Called into room, redressed w/ steri strips and new dressing, appears that one staple not holding well.   Pt inst'd in AP's, QS, SLR's and heelslides (LLE only, 2/2 RLE bleeding issues).   Perf'd t/f's to EOB as noted above, perf'd standing once, though pt in too much pain to continue, and now having low BP (101/60 seated after standing). HR:85  Pt ret'd to supine.     Patient left HOB elevated with all lines intact, call button in reach, and CPM set 0-40* on RLE w/ cryotherapy and FCD's donned ..    GOALS:   Multidisciplinary Problems       Physical Therapy Goals          Problem: Physical Therapy    Goal  Priority Disciplines Outcome Goal Variances Interventions   Physical Therapy Goal     PT, PT/OT Ongoing, Progressing     Description: Goals to be met by: 23    Patient will increase functional independence with mobility by performin. Supine to sit with supervision.   2. Sit to supine with supervision.   3. Sit<>stand transfer with supervision using rolling walker.   4. Gait > 150 feet with SBA using rolling walker.   5. Verbalize TKA precautions without verbal cues.                           Time Tracking:     PT Received On: 23  PT Start Time: 830     PT Stop Time: 925  PT Total Time (min): 55 min     Billable Minutes: Therapeutic Activity 40 and Therapeutic Exercise 15    Treatment Type: Treatment  PT/PTA: PTA     Number of PTA visits since last PT visit: 2023

## 2023-07-26 NOTE — PLAN OF CARE
Problem: Adult Inpatient Plan of Care  Goal: Optimal Comfort and Wellbeing  Intervention: Monitor Pain and Promote Comfort  Flowsheets (Taken 7/26/2023 0645)  Pain Management Interventions:   medication offered   pain management plan reviewed with patient/caregiver   cold applied   care clustered   breathing exercises utilized     Problem: Adult Inpatient Plan of Care  Goal: Optimal Comfort and Wellbeing  Intervention: Provide Person-Centered Care  Flowsheets (Taken 7/26/2023 0645)  Trust Relationship/Rapport:   care explained   emotional support provided   empathic listening provided   questions answered   reassurance provided   thoughts/feelings acknowledged     Problem: Skin Injury Risk Increased  Goal: Skin Health and Integrity  Intervention: Optimize Skin Protection  Flowsheets (Taken 7/26/2023 0645)  Pressure Reduction Techniques:   frequent weight shift encouraged   weight shift assistance provided  Pressure Reduction Devices: positioning supports utilized  Skin Protection:   adhesive use limited   skin-to-skin areas padded  Head of Bed (HOB) Positioning: HOB elevated     Problem: Adult Inpatient Plan of Care  Goal: Plan of Care Review  Outcome: Ongoing, Progressing

## 2023-07-26 NOTE — PROGRESS NOTES
Postop day 1. Bilateral knee replacements.  Doing as good as expected.  Complaints of pain.  Motion progressing ambulation progressing he lives by himself.  He is going to need extra care.  Skilled nursing ordered.

## 2023-07-26 NOTE — PT/OT/SLP PROGRESS
"Occupational Therapy   Treatment    Name: Mao Pearce  MRN: [id number]  Admitting Diagnosis:  Bilateral primary osteoarthritis of knee  1 Day Post-Op    Recommendations:     Discharge Recommendations: nursing facility, skilled  Discharge Equipment Recommendations:  walker, rolling, bath bench, bedside commode  Barriers to discharge:  Decreased caregiver support    Assessment:     Mao Pearce is a 77 y.o. male with a medical diagnosis of Bilateral primary osteoarthritis of knee.  He presents with the following performance deficits affecting function: weakness, impaired endurance, impaired self care skills, impaired functional mobility, gait instability, impaired balance, decreased coordination, decreased lower extremity function, decreased safety awareness, pain, decreased ROM, impaired skin, orthopedic precautions, edema.     Pt needing increased assist with sit>stand from chair with RW (Max A x 2) this date 2/2 pain. Pt with improved mobility, able to ambulate in room with Min A and RW (in bertrand with PT) with chair follow). Pt progressing toward goals, continues to be limited by 10/10 pain.     Rehab Prognosis:  Good; patient would benefit from acute skilled OT services to address these deficits and reach maximum level of function.       Plan:     Patient to be seen daily to address the above listed problems via self-care/home management, therapeutic activities, therapeutic exercises  Plan of Care Expires: 08/24/23  Plan of Care Reviewed with: patient    Subjective     Chief Complaint: pain  Patient/Family Comments/goals: "Brooke said he was proud of me."  Pain/Comfort:  Pain Rating 1: 10/10  Location - Side 1: Bilateral  Location 1: knee  Pain Addressed 1: Pre-medicate for activity, Reposition, Distraction, Cessation of Activity, Nurse notified  Pain Rating Post-Intervention 1: 10/10    Objective:     Communicated with: JAZMIN Stout/XIOMARA Sykes prior to session.  Patient found up in chair with peripheral IV, " cryotherapy upon OT entry to room.    General Precautions: Standard, fall    Orthopedic Precautions:RLE weight bearing as tolerated, LLE weight bearing as tolerated  Braces: N/A  Respiratory Status: Room air     Occupational Performance:     Bed Mobility:    Sit>sup with Mod A, assist with B LE, VC for technique  Scoot HOB with SBA, VC for technique, use of bed rail     Functional Mobility/Transfers:  Sit>stand from chair with RW with Max A x 2, VC for technique  Functional Mobility: Pt ambulated ~60 ft with RW with Min A (see PT note) with chair follow      [facility] 6 Click ADL: 17    Treatment & Education:  -Education on role of OT, POC, discharge recs, safe use of RW, transfer technique, bed mobility/use of hospital bed features  -Bed mobility and functional mobility as detailed above    Patient left HOB elevated with all lines intact and call button in reach    GOALS:   Multidisciplinary Problems       Occupational Therapy Goals          Problem: Occupational Therapy    Goal Priority Disciplines Outcome Interventions   Occupational Therapy Goal     OT, PT/OT Ongoing, Progressing    Description: OT goals to be met by 8/1/23.   1. Pt will complete toilet t/f with RW with SPV.  2. Pt will complete toileting with SPV.  3. Pt will complete LB dressing with adaptive equipment/technique as needed with Setup, adhering to TKA precautions.  4. Pt will complete g/h standing at sink with RW with SPV.                        Time Tracking:     OT Date of Treatment: 07/26/23  OT Start Time: 1336  OT Stop Time: 1352  OT Total Time (min): 16 min    Billable Minutes:Self Care/Home Management 16    Overlap with PT for portions of session due to complex nature of pt and need for increased safety. Two skilled therapists needed during functional mobility to decrease patient fall risk and decrease risk of caregiver injury.      OT/MIMI: OT          7/26/2023

## 2023-07-26 NOTE — PLAN OF CARE
[facility]

## 2023-07-27 ENCOUNTER — HOSPITAL ENCOUNTER (INPATIENT)
Facility: HOSPITAL | Age: 78
LOS: 15 days | Discharge: HOME HEALTH | DRG: 561 | End: 2023-08-11
Attending: HOSPITALIST | Admitting: HOSPITALIST
Payer: MEDICARE

## 2023-07-27 VITALS
TEMPERATURE: 99 F | SYSTOLIC BLOOD PRESSURE: 116 MMHG | BODY MASS INDEX: 30.61 KG/M2 | HEIGHT: 72 IN | HEART RATE: 94 BPM | DIASTOLIC BLOOD PRESSURE: 73 MMHG | WEIGHT: 226 LBS | RESPIRATION RATE: 16 BRPM | OXYGEN SATURATION: 93 %

## 2023-07-27 DIAGNOSIS — M17.0 PRIMARY OSTEOARTHRITIS OF BOTH KNEES: ICD-10-CM

## 2023-07-27 LAB
ANION GAP SERPL CALC-SCNC: 6 MMOL/L (ref 8–16)
BASOPHILS # BLD AUTO: 0.02 K/UL (ref 0–0.2)
BASOPHILS NFR BLD: 0.3 % (ref 0–1.9)
BUN SERPL-MCNC: 9 MG/DL (ref 8–23)
CALCIUM SERPL-MCNC: 8.7 MG/DL (ref 8.7–10.5)
CHLORIDE SERPL-SCNC: 103 MMOL/L (ref 95–110)
CO2 SERPL-SCNC: 26 MMOL/L (ref 23–29)
CREAT SERPL-MCNC: 0.8 MG/DL (ref 0.5–1.4)
DIFFERENTIAL METHOD: ABNORMAL
EOSINOPHIL # BLD AUTO: 0.1 K/UL (ref 0–0.5)
EOSINOPHIL NFR BLD: 0.8 % (ref 0–8)
ERYTHROCYTE [DISTWIDTH] IN BLOOD BY AUTOMATED COUNT: 11.8 % (ref 11.5–14.5)
EST. GFR  (NO RACE VARIABLE): >60 ML/MIN/1.73 M^2
GLUCOSE SERPL-MCNC: 139 MG/DL (ref 70–110)
HCT VFR BLD AUTO: 30.6 % (ref 40–54)
HGB BLD-MCNC: 10.7 G/DL (ref 14–18)
IMM GRANULOCYTES # BLD AUTO: 0.03 K/UL (ref 0–0.04)
IMM GRANULOCYTES NFR BLD AUTO: 0.4 % (ref 0–0.5)
LYMPHOCYTES # BLD AUTO: 1.2 K/UL (ref 1–4.8)
LYMPHOCYTES NFR BLD: 16.6 % (ref 18–48)
MCH RBC QN AUTO: 31.8 PG (ref 27–31)
MCHC RBC AUTO-ENTMCNC: 35 G/DL (ref 32–36)
MCV RBC AUTO: 91 FL (ref 82–98)
MONOCYTES # BLD AUTO: 0.8 K/UL (ref 0.3–1)
MONOCYTES NFR BLD: 11.3 % (ref 4–15)
NEUTROPHILS # BLD AUTO: 5 K/UL (ref 1.8–7.7)
NEUTROPHILS NFR BLD: 70.6 % (ref 38–73)
NRBC BLD-RTO: 0 /100 WBC
PLATELET # BLD AUTO: 68 K/UL (ref 150–450)
PMV BLD AUTO: 11.2 FL (ref 9.2–12.9)
POTASSIUM SERPL-SCNC: 3.9 MMOL/L (ref 3.5–5.1)
RBC # BLD AUTO: 3.36 M/UL (ref 4.6–6.2)
SODIUM SERPL-SCNC: 135 MMOL/L (ref 136–145)
WBC # BLD AUTO: 7.1 K/UL (ref 3.9–12.7)

## 2023-07-27 PROCEDURE — 97530 THERAPEUTIC ACTIVITIES: CPT | Mod: CQ

## 2023-07-27 PROCEDURE — 36415 COLL VENOUS BLD VENIPUNCTURE: CPT | Performed by: ORTHOPAEDIC SURGERY

## 2023-07-27 PROCEDURE — 85025 COMPLETE CBC W/AUTO DIFF WBC: CPT | Performed by: ORTHOPAEDIC SURGERY

## 2023-07-27 PROCEDURE — 25000003 PHARM REV CODE 250: Performed by: HOSPITALIST

## 2023-07-27 PROCEDURE — 80048 BASIC METABOLIC PNL TOTAL CA: CPT | Performed by: ORTHOPAEDIC SURGERY

## 2023-07-27 PROCEDURE — 94799 UNLISTED PULMONARY SVC/PX: CPT

## 2023-07-27 PROCEDURE — 25000003 PHARM REV CODE 250: Performed by: NURSE PRACTITIONER

## 2023-07-27 PROCEDURE — 11000004 HC SNF PRIVATE

## 2023-07-27 PROCEDURE — 25000003 PHARM REV CODE 250: Performed by: ORTHOPAEDIC SURGERY

## 2023-07-27 PROCEDURE — 97116 GAIT TRAINING THERAPY: CPT | Mod: CQ

## 2023-07-27 PROCEDURE — 97110 THERAPEUTIC EXERCISES: CPT | Mod: CQ

## 2023-07-27 PROCEDURE — 97535 SELF CARE MNGMENT TRAINING: CPT | Mod: CO

## 2023-07-27 RX ORDER — CALCIUM CARBONATE 200(500)MG
500 TABLET,CHEWABLE ORAL 2 TIMES DAILY PRN
Status: DISCONTINUED | OUTPATIENT
Start: 2023-07-27 | End: 2023-08-11 | Stop reason: HOSPADM

## 2023-07-27 RX ORDER — ACETAMINOPHEN 325 MG/1
650 TABLET ORAL EVERY 6 HOURS PRN
Status: DISCONTINUED | OUTPATIENT
Start: 2023-07-27 | End: 2023-08-11 | Stop reason: HOSPADM

## 2023-07-27 RX ORDER — AMOXICILLIN 250 MG
1 CAPSULE ORAL 2 TIMES DAILY
Status: DISCONTINUED | OUTPATIENT
Start: 2023-07-27 | End: 2023-08-02

## 2023-07-27 RX ORDER — FINASTERIDE 5 MG/1
5 TABLET, FILM COATED ORAL DAILY
Status: DISCONTINUED | OUTPATIENT
Start: 2023-07-28 | End: 2023-08-11 | Stop reason: HOSPADM

## 2023-07-27 RX ORDER — ATORVASTATIN CALCIUM 40 MG/1
40 TABLET, FILM COATED ORAL NIGHTLY
Status: DISCONTINUED | OUTPATIENT
Start: 2023-07-27 | End: 2023-08-11 | Stop reason: HOSPADM

## 2023-07-27 RX ORDER — TALC
6 POWDER (GRAM) TOPICAL NIGHTLY PRN
Status: DISCONTINUED | OUTPATIENT
Start: 2023-07-27 | End: 2023-08-11 | Stop reason: HOSPADM

## 2023-07-27 RX ORDER — PANTOPRAZOLE SODIUM 40 MG/1
40 TABLET, DELAYED RELEASE ORAL DAILY
Status: DISCONTINUED | OUTPATIENT
Start: 2023-07-28 | End: 2023-08-01

## 2023-07-27 RX ORDER — ALPRAZOLAM 0.5 MG/1
1 TABLET ORAL NIGHTLY PRN
Status: DISCONTINUED | OUTPATIENT
Start: 2023-07-27 | End: 2023-08-11 | Stop reason: HOSPADM

## 2023-07-27 RX ORDER — OXYCODONE AND ACETAMINOPHEN 10; 325 MG/1; MG/1
1 TABLET ORAL EVERY 6 HOURS PRN
Status: DISCONTINUED | OUTPATIENT
Start: 2023-07-27 | End: 2023-07-28

## 2023-07-27 RX ADMIN — FAMOTIDINE 20 MG: 20 TABLET, FILM COATED ORAL at 08:07

## 2023-07-27 RX ADMIN — OXYCODONE AND ACETAMINOPHEN 1 TABLET: 10; 325 TABLET ORAL at 01:07

## 2023-07-27 RX ADMIN — OXYCODONE AND ACETAMINOPHEN 1 TABLET: 10; 325 TABLET ORAL at 09:07

## 2023-07-27 RX ADMIN — OXYCODONE AND ACETAMINOPHEN 1 TABLET: 10; 325 TABLET ORAL at 02:07

## 2023-07-27 RX ADMIN — ATORVASTATIN CALCIUM 40 MG: 40 TABLET, FILM COATED ORAL at 08:07

## 2023-07-27 RX ADMIN — POLYETHYLENE GLYCOL 3350 17 G: 17 POWDER, FOR SOLUTION ORAL at 08:07

## 2023-07-27 RX ADMIN — PREGABALIN 75 MG: 75 CAPSULE ORAL at 08:07

## 2023-07-27 RX ADMIN — OXYCODONE AND ACETAMINOPHEN 1 TABLET: 10; 325 TABLET ORAL at 08:07

## 2023-07-27 RX ADMIN — FINASTERIDE 5 MG: 5 TABLET, FILM COATED ORAL at 08:07

## 2023-07-27 RX ADMIN — ASPIRIN 81 MG CHEWABLE TABLET 81 MG: 81 TABLET CHEWABLE at 08:07

## 2023-07-27 RX ADMIN — MUPIROCIN: 20 OINTMENT TOPICAL at 08:07

## 2023-07-27 NOTE — PLAN OF CARE
Pt AAOx4. VSS. Poc reviewed with pt and questions answered. Pt repositioning with assist. Appetite is fair and tolerating diet well. Voiding adequately via urinal left at bedside, urine clear and yellow. Dressings to B/L knees CDI. Polar care maintained throughout night. Encouraged fluids and use of IS this shift. Prn Percocet given for c/o pain with moderate relief noted. Pt remains free from falls, injury, and skin breakdown. All needs and concerns met, purposeful rounding done, and safety maintained. Foot pumps in place. Call light in reach. Will continue to monitor.

## 2023-07-27 NOTE — PT/OT/SLP PROGRESS
Physical Therapy Treatment    Patient Name:  Mao Pearce   MRN:  [id number]    Recommendations:     Discharge Recommendations: nursing facility, skilled  Discharge Equipment Recommendations: bedside commode, bath bench, walker, rolling, to be determined by next level of care  Barriers to discharge: Decreased caregiver support    Assessment:     Mao Pearce is a 77 y.o. male admitted with a medical diagnosis of Bilateral primary osteoarthritis of knee.  He presents with the following impairments/functional limitations: weakness, impaired endurance, impaired self care skills, impaired functional mobility, gait instability, impaired balance, decreased coordination, decreased lower extremity function, decreased safety awareness, pain, decreased ROM, impaired skin, edema, orthopedic precautions ;pt with fair/good mobility today, still needing max A to stand up from elevated surfaces, 2/2 dec knee flexion and high level of pain.    Rehab Prognosis: Good; patient would benefit from acute skilled PT services to address these deficits and reach maximum level of function.    Recent Surgery: Procedure(s) (LRB):  ARTHROPLASTY, KNEE, TOTAL REPLACEMENT (Bilateral) 2 Days Post-Op    Plan:     During this hospitalization, patient to be seen BID to address the identified rehab impairments via gait training, therapeutic activities, therapeutic exercises, neuromuscular re-education and progress toward the following goals:    Plan of Care Expires:  08/08/23    Subjective     Chief Complaint: pain  Patient/Family Comments/goals: pt agreeable to session.   Pain/Comfort:  Pain Rating 1: 10/10 (with t/f's and amb)  Location - Side 1: Bilateral  Location - Orientation 1: generalized  Location 1: knee  Pain Addressed 1: Pre-medicate for activity, Reposition, Distraction, Nurse notified  Pain Rating Post-Intervention 1: 5/10 (at rest)      Objective:     Communicated with nurse prior to session.  Patient found up in chair with  "cryotherapy, peripheral IV , LE's elevated upon PT entry to room.     General Precautions: Standard, fall  Orthopedic Precautions: RLE weight bearing as tolerated, LLE weight bearing as tolerated  Braces: N/A  Respiratory Status: Room air     Functional Mobility:  Bed Mobility:     Sit to Supine: moderate assistance, maximal assistance, and at LE's  Transfers:     Sit to Stand:  maximal assistance and of 1 persons with rolling walker and from elevated chair  Gait: amb'd 12' in room from chair to bed w/ RW and min.A, very slow steps, dec step length, dec knee flexion noted., WBAT BLE"s      AM-PAC 6 CLICK MOBILITY  Turning over in bed (including adjusting bedclothes, sheets and blankets)?: 3  Sitting down on and standing up from a chair with arms (e.g., wheelchair, bedside commode, etc.): 2  Moving from lying on back to sitting on the side of the bed?: 2  Moving to and from a bed to a chair (including a wheelchair)?: 2  Need to walk in hospital room?: 3  Climbing 3-5 steps with a railing?: 1  Basic Mobility Total Score: 13       Treatment & Education:  Applied CPM to RLE, set 0-50*, pt without c/o's. Coldpack to L knee (vs cyrotherapy), to see if coldness can get to knee better.     Patient left HOB elevated with all lines intact, call button in reach, and nurse notified..    GOALS:   Multidisciplinary Problems       Physical Therapy Goals          Problem: Physical Therapy    Goal Priority Disciplines Outcome Goal Variances Interventions   Physical Therapy Goal     PT, PT/OT Ongoing, Progressing     Description: Goals to be met by: 23    Patient will increase functional independence with mobility by performin. Supine to sit with supervision.   2. Sit to supine with supervision.   3. Sit<>stand transfer with supervision using rolling walker.   4. Gait > 150 feet with SBA using rolling walker.   5. Verbalize TKA precautions without verbal cues.                           Time Tracking:     PT Received On: " 07/27/23  PT Start Time: 1237     PT Stop Time: 1322  PT Total Time (min): 45 min     Billable Minutes: Gait Training 15 and Therapeutic Activity 30    Treatment Type: Treatment  PT/PTA: PTA     Number of PTA visits since last PT visit: 3    07/27/2023

## 2023-07-27 NOTE — PT/OT/SLP PROGRESS
Physical Therapy Treatment    Patient Name:  Mao Pearce   MRN:  [id number]    Recommendations:     Discharge Recommendations: nursing facility, skilled  Discharge Equipment Recommendations: bedside commode, bath bench, walker, rolling, to be determined by next level of care  Barriers to discharge: Decreased caregiver support    Assessment:     Mao Pearce is a 77 y.o. male admitted with a medical diagnosis of Bilateral primary osteoarthritis of knee.  He presents with the following impairments/functional limitations: weakness, impaired endurance, impaired self care skills, impaired functional mobility, gait instability, impaired balance, decreased coordination, decreased lower extremity function, decreased safety awareness, pain, decreased ROM, impaired skin, edema, orthopedic precautions ;pt with fair/good mobility today, still having a lot of pain and limited knee flexion, tina on RLE. Bleeding has stopped on RLE incision, nsg. Called into room to remove ace wrap and redress knee, BRENT hose applied by therapist.    Rehab Prognosis: Good; patient would benefit from acute skilled PT services to address these deficits and reach maximum level of function.    Recent Surgery: Procedure(s) (LRB):  ARTHROPLASTY, KNEE, TOTAL REPLACEMENT (Bilateral) 2 Days Post-Op    Plan:     During this hospitalization, patient to be seen BID to address the identified rehab impairments via gait training, therapeutic activities, therapeutic exercises, neuromuscular re-education and progress toward the following goals:    Plan of Care Expires:  08/08/23    Subjective     Chief Complaint: pain, R>L  Patient/Family Comments/goals: pt agreeable to session, reports being able to sleep last night, though c/o pain this AM, knees very stiff.  Pain/Comfort:  Pain Rating 1: 10/10 (10/10 w/ t/f's sit to stand to sit)  Location - Side 1: Bilateral  Location - Orientation 1: generalized  Location 1: knee (R>L)  Pain Addressed 1: Pre-medicate  "for activity, Reposition, Distraction, Cessation of Activity, Nurse notified  Pain Rating Post-Intervention 1: 5/10 (at rest)      Objective:     Communicated with nurse prior to session.  Patient found HOB elevated with cryotherapy, peripheral IV (ace wrap to R knee, no BRENT hose on RLE) upon PT entry to room.     General Precautions: Standard, fall  Orthopedic Precautions: RLE weight bearing as tolerated, LLE weight bearing as tolerated  Braces: N/A  Respiratory Status: Room air     Functional Mobility:  Bed Mobility:     Supine to Sit: moderate assistance and at LE's w/ pt using bedrail and HOB elevated partially  Transfers:     Sit to Stand:  maximal assistance and of 2 persons with rolling walker and from elevated EOB and elevated chair w/arms  Gait: amb'd ~60' w/ RW and min.A, WBAT on BLE's, step to gt pattern initially, pt able to perform slight step through w/ cueing, dec heelstrike and knee flexion noted.      AM-PAC 6 CLICK MOBILITY  Turning over in bed (including adjusting bedclothes, sheets and blankets)?: 3  Sitting down on and standing up from a chair with arms (e.g., wheelchair, bedside commode, etc.): 2  Moving from lying on back to sitting on the side of the bed?: 2  Moving to and from a bed to a chair (including a wheelchair)?: 2  Need to walk in hospital room?: 3  Climbing 3-5 steps with a railing?: 1  Basic Mobility Total Score: 13       Treatment & Education:  Pt perf'd supine AP's, QS, AAROM SLR"s, AAROM heelslides x 8 ea.   Seated AAROM LAQ"s x 10 ea.   MONTGOMERY present and working on commode t/f's , max.A x 2 req'd.    Patient left up in chair with all lines intact, call button in reach, nurse notified, MONTGOMERY present, and LE's elevated ..    GOALS:   Multidisciplinary Problems       Physical Therapy Goals          Problem: Physical Therapy    Goal Priority Disciplines Outcome Goal Variances Interventions   Physical Therapy Goal     PT, PT/OT Ongoing, Progressing     Description: Goals to be met by: " 23    Patient will increase functional independence with mobility by performin. Supine to sit with supervision.   2. Sit to supine with supervision.   3. Sit<>stand transfer with supervision using rolling walker.   4. Gait > 150 feet with SBA using rolling walker.   5. Verbalize TKA precautions without verbal cues.                           Time Tracking:     PT Received On: 23  PT Start Time: 0950     PT Stop Time: 1045  PT Total Time (min): 55 min     Billable Minutes: Gait Training 25, Therapeutic Activity 15, and Therapeutic Exercise 15    Treatment Type: Treatment  PT/PTA: PTA     Number of PTA visits since last PT visit: 3     2023

## 2023-07-27 NOTE — DISCHARGE SUMMARY
[facility] - Med Surg ([address])  Discharge Summary      Admit Date: 7/25/2023    Discharge Date and Time: No discharge date for patient encounter.    Attending Physician: Celestine Cox MD     Reason for Admission:  Osteoarthritis both knees    Procedures Performed: Procedure(s) (LRB):  ARTHROPLASTY, KNEE, TOTAL REPLACEMENT (Bilateral)    Hospital Course (synopsis of major diagnoses, care, treatment, and services provided during the course of the hospital stay): The above patient underwent the above procedure.  Post operative the patient received pain management and pt / ot. The patient progressed well and will be discharged--see orders.      Goals of Care Treatment Preferences:  Code Status: Full Code      Consults: nephrology    Significant Diagnostic Studies: Labs: All labs within the past 24 hours have been reviewed    Final Diagnoses:    Principal Problem: Bilateral primary osteoarthritis of knee   Secondary Diagnoses:   Active Hospital Problems    Diagnosis  POA    *Bilateral primary osteoarthritis of knee [M17.0]  Yes      Resolved Hospital Problems   No resolved problems to display.       Discharged Condition: good    Disposition: Skilled Nursing Facility    Follow Up/Patient Instructions:     Medications:  Reconciled Home Medications:      Medication List        CONTINUE taking these medications      ALPRAZolam 1 MG tablet  Commonly known as: XANAX  TK 1 T PO QHS PRN     atorvastatin 40 MG tablet  Commonly known as: LIPITOR  Take 40 mg by mouth every evening.     finasteride 5 mg tablet  Commonly known as: PROSCAR  TK 1/2 T PO QD     pantoprazole 40 MG tablet  Commonly known as: PROTONIX            ASK your doctor about these medications      oxyCODONE-acetaminophen  mg per tablet  Commonly known as: PERCOCET  Take 1 tablet by mouth every 6 (six) hours as needed.            No discharge procedures on file.

## 2023-07-27 NOTE — PLAN OF CARE
07/27/23 1317   Final Note   Assessment Type Final Discharge Note   Anticipated Discharge Disposition SNF   Hospital Resources/Appts/Education Provided Provided patient/caregiver with written discharge plan information;Appointments scheduled and added to AVS;Appointments scheduled by Navigator/Coordinator   Post-Acute Status   Post-Acute Authorization Placement   Post-Acute Placement Status Set-up Complete/Auth obtained   Patient choice form signed by patient/caregiver List with quality metrics by geographic area provided   Discharge Delays None known at this time     Patient will discharge to [facility].     Patient will go in bed 400. Please call report to [phone] nurses station.    Patient will be transported by [facility] ambulance to [address] [place] [address]    Patient discharge orders/ needs were addressed from a SW standpoint.

## 2023-07-27 NOTE — PLAN OF CARE
[facility]   Department of Hospital Medicine  [address] [place] [address]  [phone] (phone)  [phone] (fax)      Facility Transfer Orders                        07/27/2023    Mao Pearce    Admit to:  Skilled nursing    Diagnoses:  Active Hospital Problems    Diagnosis  POA    *Bilateral primary osteoarthritis of knee [M17.0]  Yes      Resolved Hospital Problems   No resolved problems to display.       Allergies:  Review of patient's allergies indicates:   Allergen Reactions    Ciprofloxacin Rash    Metronidazole Rash       Vitals:       Routine, Daily      Diet:     Activity:     - Up in a chair each morning as tolerated   - Ambulate with assistance to bathroom    - Weight bearing:  As tolerated     Nursing Precautions:     - Fall precautions   - Maintain Posterior Hip Precautions for 6 weeks   - May ice incision for pain management and swelling    Wound Care:   - Steven hose with island dressing    - Change dressing every 3 day and prn saturation.      CONSULTS:      PT to evaluate and treat - five times a week     OT to evaluate and treat - five times a week    Follow Up:         Medications: Discontinue all previous medication orders, if any. See new list below.    Current Discharge Medication List        CONTINUE these medications which have NOT CHANGED    Details   ALPRAZolam (XANAX) 1 MG tablet TK 1 T PO QHS PRN  Refills: 1      atorvastatin (LIPITOR) 40 MG tablet Take 40 mg by mouth every evening.      pantoprazole (PROTONIX) 40 MG tablet       finasteride (PROSCAR) 5 mg tablet TK 1/2 T PO QD  Refills: 3      oxyCODONE-acetaminophen (PERCOCET)  mg per tablet Take 1 tablet by mouth every 6 (six) hours as needed.  Qty: 50 tablet, Refills: 0    Comments: Quantity prescribed more than 7 day supply? Yes, quantity medically necessary            aspirin  81 mg Oral BID    atorvastatin  40 mg Oral QHS    famotidine  20 mg Oral Daily    finasteride  5  mg Oral Daily    mupirocin   Nasal BID    polyethylene glycol  17 g Oral Daily    pregabalin  75 mg Oral BID           ________________________________  Celestine Cox MD  07/27/2023

## 2023-07-27 NOTE — PLAN OF CARE
[facility]   Department of Hospital Medicine  [address] [place] [address]  [phone] (phone)  [phone] (fax)      Facility Transfer Orders                        07/27/2023    Mao Pearce    Admit to:  Skilled nursing    Diagnoses:  Active Hospital Problems    Diagnosis  POA    *Bilateral primary osteoarthritis of knee [M17.0]  Yes      Resolved Hospital Problems   No resolved problems to display.       Allergies:  Review of patient's allergies indicates:   Allergen Reactions    Ciprofloxacin Rash    Metronidazole Rash       Vitals:       Routine, Daily      Diet:  Regular    Activity:     - Up in a chair each morning as tolerated   - Ambulate with assistance to bathroom    - Weight bearing:  As tolerated     Nursing Precautions:     - Fall precautions   - Maintain Posterior Hip Precautions for 6 weeks   - May ice incision for pain management and swelling    Wound Care:   - Change surgical dressing on 3rd day as needed and replace with island dressing and Steven hose   - Change dressing every 3 day and prn saturation.      CONSULTS:      PT to evaluate and treat - five times a week     OT to evaluate and treat - five times a week    Follow Up:         Medications: Discontinue all previous medication orders, if any. See new list below.    Current Discharge Medication List        CONTINUE these medications which have NOT CHANGED    Details   ALPRAZolam (XANAX) 1 MG tablet TK 1 T PO QHS PRN  Refills: 1      atorvastatin (LIPITOR) 40 MG tablet Take 40 mg by mouth every evening.      pantoprazole (PROTONIX) 40 MG tablet       finasteride (PROSCAR) 5 mg tablet TK 1/2 T PO QD  Refills: 3      oxyCODONE-acetaminophen (PERCOCET)  mg per tablet Take 1 tablet by mouth every 6 (six) hours as needed.  Qty: 50 tablet, Refills: 0    Comments: Quantity prescribed more than 7 day supply? Yes, quantity medically necessary            aspirin  81 mg Oral BID     atorvastatin  40 mg Oral QHS    famotidine  20 mg Oral Daily    finasteride  5 mg Oral Daily    mupirocin   Nasal BID    polyethylene glycol  17 g Oral Daily           ________________________________  Celestine Cox MD  07/27/2023

## 2023-07-27 NOTE — PLAN OF CARE
[facility]    FACILITY TRANSFER ORDERS      Patient Name: Mao Pearce  YOB: 1945    PCP: Primary Doctor No   PCP Address: None  PCP Phone Number: None  PCP Fax: None    Encounter Date: 07/27/2023    Admit to:  Skilled nursing, FULL CODE STATUS    Vital Signs:  Routine    Diagnoses:   Active Hospital Problems    Diagnosis  POA    *Bilateral primary osteoarthritis of knee [M17.0]  Yes      Resolved Hospital Problems   No resolved problems to display.       Allergies:  Review of patient's allergies indicates:   Allergen Reactions    Ciprofloxacin Rash    Metronidazole Rash       Diet: regular diet    Activities: Weight bearing as tolerated    Nursing:  Routine routine     Labs:  None   as needed      CONSULTS:    Physical Therapy to evaluate and treat.  OT as needed eval treat    MISCELLANEOUS CARE:  Wound care orders as ordered    WOUND CARE ORDERS  Yes:  Keep dry clean covered change every 3 days or as needed.  Steven hose 4x4s island . Remove staples on August 8,2023    Medications: Review discharge medications with patient and family and provide education.      Current Discharge Medication List        CONTINUE these medications which have NOT CHANGED    Details   ALPRAZolam (XANAX) 1 MG tablet TK 1 T PO QHS PRN  Refills: 1      atorvastatin (LIPITOR) 40 MG tablet Take 40 mg by mouth every evening.      pantoprazole (PROTONIX) 40 MG tablet Take 40mg by mouth daily      finasteride (PROSCAR) 5 mg tablet TK 1 TAB PO QD  Refills: 3      oxyCODONE-acetaminophen (PERCOCET)  mg per tablet Take 1 tablet by mouth every 6 (six) hours as needed.  Qty: 50 tablet, Refills: 0    Comments: Quantity prescribed more than 7 day supply? Yes, quantity medically necessary                  _________________________________  Celestine Cox MD  07/27/2023

## 2023-07-27 NOTE — PLAN OF CARE
07/27/23 0856   Final Note   Assessment Type Final Discharge Note   Anticipated Discharge Disposition SNF   Hospital Resources/Appts/Education Provided Provided patient/caregiver with written discharge plan information;Appointments scheduled and added to AVS;Appointments scheduled by Navigator/Coordinator   Post-Acute Status   Post-Acute Authorization Placement   Post-Acute Placement Status Set-up Complete/Auth obtained   Patient choice form signed by patient/caregiver List with quality metrics by geographic area provided   Discharge Delays None known at this time     Patient will discharge to [facility] located at [address] [place] [address] [place] [address]    Patient will be transported by [facility] 30 ambulance to [address] [place] [address].     SW awaits room & report from [facility]. Patient discharge orders/needs were addressed from a CM standpoint.

## 2023-07-27 NOTE — NURSING
Pt admitted to SNF Unit on 07/27 transported via stretcher by [facility]. Upon arrival pt appears AAOX4 in no apparent distress S/P Arthoplasty to both bilateral knees steri strips and island border dressing in place with Steven Hose to BLE  skin otherwise intact.Full Code, RA, Reg Diet. V/S WDL pt has been oriented to room, call light is at bedside.

## 2023-07-27 NOTE — PROGRESS NOTES
MED  Progress Note    Admit Date: 7/25/2023   LOS: 0 days     SUBJECTIVE:     Follow-up For:  Bilateral primary osteoarthritis of knee    Interval History:     pain better controlled and eager to go to SNF.  No CP/SOB/Calf pain.        Review of Systems:  Constitutional: No fever or chills  Respiratory: No cough or shortness of breath  Cardiovascular: No chest pain or palpitations  Gastrointestinal: No nausea or vomiting  Neurological: No confusion or weakness    OBJECTIVE:     Vital Signs Range (Last 24H):  /72 (BP Location: Left arm, Patient Position: Lying)   Pulse 92   Temp 98.4 °F (36.9 °C) (Oral)   Resp 18   Ht 6' (1.829 m)   Wt 102.5 kg (226 lb)   SpO2 95%   BMI 30.65 kg/m²     Temp:  [98.2 °F (36.8 °C)-99.6 °F (37.6 °C)]   Pulse:  [90-97]   Resp:  [16-20]   BP: (126-164)/(72-87)   SpO2:  [92 %-98 %]     I & O (Last 24H):  Intake/Output Summary (Last 24 hours) at 7/27/2023 0846  Last data filed at 7/27/2023 0631  Gross per 24 hour   Intake 990 ml   Output 4675 ml   Net -3685 ml         Physical Exam:  General appearance: Well developed, well nourished  Eyes:  Conjunctivae/corneas clear. PERRL.  Lungs: Normal respiratory effort,   clear to auscultation bilaterally   Heart: Regular rate and rhythm, S1, S2 normal, no murmur, rub or nacho.  Abdomen: Soft, non-tender non-distended; bowel sounds normal; no masses,  no organomegaly  Extremities: No cyanosis or clubbing. No edema.    Skin: Skin color, texture, turgor normal. No rashes or lesions  Neurologic: Normal strength and tone. No focal numbness or weakness   Olivier knee CDI    Laboratory Data:  Recent Labs   Lab 07/27/23 0425   WBC 7.10   RBC 3.36*   HGB 10.7*   HCT 30.6*   PLT 68*   MCV 91   MCH 31.8*   MCHC 35.0         BMP:   Recent Labs   Lab 07/27/23 0425   *   *   K 3.9      CO2 26   BUN 9   CREATININE 0.8   CALCIUM 8.7       Lab Results   Component Value Date    CALCIUM 8.7 07/27/2023       Lab Results   Component Value  Date    CALCIUM 8.7 07/27/2023       No results found for: URICACID    BNP  No results for input(s): BNP, BNPTRIAGEBLO in the last 168 hours.    Medications:  Medication list was reviewed and changes noted under Assessment/Plan.    Diagnostic Results:        ASSESSMENT/PLAN:     Olivier Knee:  per ortho/therapy teams.     BPH:  proscar and monitored post anesthesia voiding.   Doing ok so far.      Lipids:  statin.     DVT:  ASA BID.     Pain control:  defer to ortho.        As above  SNF

## 2023-07-27 NOTE — PT/OT/SLP PROGRESS
Occupational Therapy   Treatment    Name: Mao Pearce  MRN: [id number]  Admitting Diagnosis:  Bilateral primary osteoarthritis of knee  2 Days Post-Op    Recommendations:     Discharge Recommendations: nursing facility, skilled  Discharge Equipment Recommendations:  bedside commode, bath bench, walker, rolling, to be determined by next level of care  Barriers to discharge:  Decreased caregiver support    Assessment:     Mao Pearce is a 77 y.o. male with a medical diagnosis of Bilateral primary osteoarthritis of knee.  He presents with 10/10 pain in B knees during ADL transfer/mobility. Performance deficits affecting function are weakness, impaired endurance, impaired self care skills, impaired functional mobility, gait instability, impaired balance, decreased lower extremity function, decreased safety awareness, pain, decreased ROM, orthopedic precautions, impaired skin, decreased coordination.     Supine > Sit: Mod A  Sit <> Stand: Max A x 2 from EOB, chair, BSC - cues for hand placement during transitional movements   Functional Mobility: Min A and RW - repeated cues for upright posture and sequencing/coordination with RW management   Toileting: Min A and RW while standing to urinate in urinal   Grooming: Set Up and SBA seated in chair   LB Dressing: Total A to don/doff S socks   UB Dressing: Min A to don/doff gown   LB Sponge Bathing: Set up and SBA for genital hygiene seated in chair     Rehab Prognosis:  Good; patient would benefit from acute skilled OT services to address these deficits and reach maximum level of function.       Plan:     Patient to be seen daily to address the above listed problems via self-care/home management, therapeutic activities, therapeutic exercises  Plan of Care Expires: 08/24/23  Plan of Care Reviewed with: patient    Subjective     Chief Complaint: Bilateral Knee pain   Patient/Family Comments/goals: agreeable to participate in therapy for OT intervention    Pain/Comfort:  Pain Rating 1: 10/10  Location - Side 1: Bilateral  Location - Orientation 1: generalized  Location 1: knee  Pain Addressed 1: Pre-medicate for activity, Reposition, Distraction, Cessation of Activity, Nurse notified  Pain Rating Post-Intervention 1: 5/10 (at rest)    Objective:     Communicated with: RN (Maritza) prior to session.  Patient found supine with cryotherapy, peripheral IV upon OT entry to room.    General Precautions: Standard, fall    Orthopedic Precautions:RUE weight bearing as tolerated, LLE weight bearing as tolerated  Braces: N/A  Respiratory Status: Room air     Occupational Performance:     Bed Mobility:    Supine > Sit: Mod A  Scooting EOB: CGA     Functional Mobility/Transfers:  Sit <> Stand: cues for hand placement during transitional movements; require increase of time during transitional movements due to pain   EOB: Max A x 2  Chair: Max A x 2   BSC: Max A x 2    Functional Mobility: Min A and RW - repeated cues for upright posture and sequencing/coordination with RW management     Activities of Daily Living:  Toileting: Min A and RW while standing to urinate in urinal   Grooming: Set Up and SBA seated in chair   LB Dressing: Total A to don/doff S socks   UB Dressing: Min A to don/doff gown   LB Sponge Bathing: Set up and SBA for genital hygiene seated in chair       AMPAC 6 Click ADL: 16    Treatment & Education:  OT role, plan of care, progression of goals, importance of continued OOB activity, ADL/functional transfer and mobility retraining, discharge recommendation, call don't fall, safety precautions, fall prevention, RW management, hand and feet positioning during sit<>stand.      Patient left up in chair with all lines intact, call button in reach, and RN notified    GOALS:   Multidisciplinary Problems       Occupational Therapy Goals          Problem: Occupational Therapy    Goal Priority Disciplines Outcome Interventions   Occupational Therapy Goal     OT, PT/OT  Ongoing, Progressing    Description: OT goals to be met by 8/1/23.   1. Pt will complete toilet t/f with RW with SPV.  2. Pt will complete toileting with SPV.  3. Pt will complete LB dressing with adaptive equipment/technique as needed with Setup, adhering to TKA precautions.  4. Pt will complete g/h standing at sink with RW with SPV.                        Time Tracking:     OT Date of Treatment: 07/27/23  OT Start Time: 0950  OT Stop Time: 1059  OT Total Time (min): 69 min    Billable Minutes:Self Care/Home Management 69 min    Co treament performed due to patient's multiple deficits requiring two skilled therapists to safely assess patient's ability to complete ADLs and functional mobility in addition to accommodating for patient's activity tolerance while in acute care setting.     OT/MIMI: MIMI     Number of MIMI visits since last OT visit: 1 7/27/2023

## 2023-07-27 NOTE — PLAN OF CARE
[facility]    FACILITY TRANSFER ORDERS      Patient Name: Mao Pearce  YOB: 1945    PCP: Primary Doctor No   PCP Address: None  PCP Phone Number: None  PCP Fax: None    Encounter Date: 07/27/2023    Admit to:  Skilled nursing    Vital Signs:  Routine    Diagnoses:   Active Hospital Problems    Diagnosis  POA    *Bilateral primary osteoarthritis of knee [M17.0]  Yes      Resolved Hospital Problems   No resolved problems to display.       Allergies:  Review of patient's allergies indicates:   Allergen Reactions    Ciprofloxacin Rash    Metronidazole Rash       Diet: regular diet    Activities: Activity as tolerated and Weight bearing as tolerated    Nursing:  Standard nursing care     Labs:  None   as needed      CONSULTS:    Physical Therapy to evaluate and treat.     MISCELLANEOUS CARE:  Keep knees dry clean covered Steven hose island dressing change as needed    WOUND CARE ORDERS  Yes:  As above knee dressings    Medications: Review discharge medications with patient and family and provide education.      Current Discharge Medication List        CONTINUE these medications which have NOT CHANGED    Details   ALPRAZolam (XANAX) 1 MG tablet TK 1 T PO QHS PRN  Refills: 1      atorvastatin (LIPITOR) 40 MG tablet Take 40 mg by mouth every evening.      pantoprazole (PROTONIX) 40 MG tablet       finasteride (PROSCAR) 5 mg tablet TK 1/2 T PO QD  Refills: 3      oxyCODONE-acetaminophen (PERCOCET)  mg per tablet Take 1 tablet by mouth every 6 (six) hours as needed.  Qty: 50 tablet, Refills: 0    Comments: Quantity prescribed more than 7 day supply? Yes, quantity medically necessary                  _________________________________  Celestine Cox MD  07/27/2023

## 2023-07-28 PROBLEM — F41.9 ANXIETY: Status: ACTIVE | Noted: 2023-03-08

## 2023-07-28 PROBLEM — E78.5 HYPERLIPIDEMIA: Status: ACTIVE | Noted: 2023-03-08

## 2023-07-28 PROBLEM — R53.81 DEBILITY: Status: ACTIVE | Noted: 2023-07-28

## 2023-07-28 PROBLEM — N40.0 BPH (BENIGN PROSTATIC HYPERPLASIA): Status: ACTIVE | Noted: 2023-07-28

## 2023-07-28 PROBLEM — I25.10 ARTERIOSCLEROSIS OF CORONARY ARTERY: Status: ACTIVE | Noted: 2023-07-28

## 2023-07-28 PROCEDURE — 97165 OT EVAL LOW COMPLEX 30 MIN: CPT

## 2023-07-28 PROCEDURE — 97535 SELF CARE MNGMENT TRAINING: CPT

## 2023-07-28 PROCEDURE — 11000004 HC SNF PRIVATE

## 2023-07-28 PROCEDURE — 25000003 PHARM REV CODE 250: Performed by: HOSPITALIST

## 2023-07-28 PROCEDURE — 97110 THERAPEUTIC EXERCISES: CPT

## 2023-07-28 PROCEDURE — 25000003 PHARM REV CODE 250: Performed by: NURSE PRACTITIONER

## 2023-07-28 PROCEDURE — 97162 PT EVAL MOD COMPLEX 30 MIN: CPT

## 2023-07-28 PROCEDURE — 97530 THERAPEUTIC ACTIVITIES: CPT

## 2023-07-28 RX ORDER — ACETAMINOPHEN 500 MG
1000 TABLET ORAL EVERY 8 HOURS
Status: DISCONTINUED | OUTPATIENT
Start: 2023-07-28 | End: 2023-08-03

## 2023-07-28 RX ORDER — POLYETHYLENE GLYCOL 3350 17 G/17G
17 POWDER, FOR SOLUTION ORAL DAILY
Status: DISCONTINUED | OUTPATIENT
Start: 2023-07-29 | End: 2023-08-08

## 2023-07-28 RX ORDER — DOCUSATE SODIUM 283 MG/5ML
1 LIQUID RECTAL DAILY
Status: DISCONTINUED | OUTPATIENT
Start: 2023-07-28 | End: 2023-08-01

## 2023-07-28 RX ORDER — METHOCARBAMOL 500 MG/1
500 TABLET, FILM COATED ORAL 4 TIMES DAILY
Status: DISCONTINUED | OUTPATIENT
Start: 2023-07-28 | End: 2023-08-03

## 2023-07-28 RX ORDER — ALUMINUM HYDROXIDE, MAGNESIUM HYDROXIDE, AND SIMETHICONE 2400; 240; 2400 MG/30ML; MG/30ML; MG/30ML
30 SUSPENSION ORAL EVERY 6 HOURS PRN
Status: DISCONTINUED | OUTPATIENT
Start: 2023-07-28 | End: 2023-08-11 | Stop reason: HOSPADM

## 2023-07-28 RX ORDER — OXYCODONE HYDROCHLORIDE 10 MG/1
10 TABLET ORAL EVERY 4 HOURS PRN
Status: DISCONTINUED | OUTPATIENT
Start: 2023-07-28 | End: 2023-08-01

## 2023-07-28 RX ORDER — NAPROXEN SODIUM 220 MG/1
81 TABLET, FILM COATED ORAL 2 TIMES DAILY
Status: DISCONTINUED | OUTPATIENT
Start: 2023-07-28 | End: 2023-08-11 | Stop reason: HOSPADM

## 2023-07-28 RX ADMIN — OXYCODONE HYDROCHLORIDE 10 MG: 10 TABLET ORAL at 05:07

## 2023-07-28 RX ADMIN — METHOCARBAMOL 500 MG: 500 TABLET ORAL at 03:07

## 2023-07-28 RX ADMIN — ACETAMINOPHEN 1000 MG: 500 TABLET ORAL at 03:07

## 2023-07-28 RX ADMIN — ACETAMINOPHEN 1000 MG: 500 TABLET ORAL at 09:07

## 2023-07-28 RX ADMIN — METHOCARBAMOL 500 MG: 500 TABLET ORAL at 09:07

## 2023-07-28 RX ADMIN — OXYCODONE AND ACETAMINOPHEN 1 TABLET: 10; 325 TABLET ORAL at 10:07

## 2023-07-28 RX ADMIN — SENNOSIDES AND DOCUSATE SODIUM 1 TABLET: 50; 8.6 TABLET ORAL at 09:07

## 2023-07-28 RX ADMIN — ASPIRIN 81 MG 81 MG: 81 TABLET ORAL at 01:07

## 2023-07-28 RX ADMIN — ATORVASTATIN CALCIUM 40 MG: 40 TABLET, FILM COATED ORAL at 09:07

## 2023-07-28 RX ADMIN — METHOCARBAMOL 500 MG: 500 TABLET ORAL at 05:07

## 2023-07-28 RX ADMIN — FINASTERIDE 5 MG: 5 TABLET, FILM COATED ORAL at 09:07

## 2023-07-28 RX ADMIN — ASPIRIN 81 MG 81 MG: 81 TABLET ORAL at 09:07

## 2023-07-28 RX ADMIN — OXYCODONE HYDROCHLORIDE 10 MG: 10 TABLET ORAL at 09:07

## 2023-07-28 RX ADMIN — DOCUSATE SODIUM 1 ENEMA: 283 LIQUID RECTAL at 03:07

## 2023-07-28 RX ADMIN — PANTOPRAZOLE SODIUM 40 MG: 40 TABLET, DELAYED RELEASE ORAL at 09:07

## 2023-07-28 RX ADMIN — OXYCODONE AND ACETAMINOPHEN 1 TABLET: 10; 325 TABLET ORAL at 03:07

## 2023-07-28 NOTE — PROGRESS NOTES
Food & Nutrition  Education    Diet Education: protein needs and sources, Petey wound care product, GERD  Time Spent: 5 minutes  Learners:patient       Nutrition Education provided with handouts:       Comments:Patient on bedpan, will read materials later.  Patient does not want to commit to Petey BID for 14 days. He believes he eats enough protein at home with his steak and eggs. Pointed out to patient that he had two surgeries from which he is recovering and that to date post op his intake has not been good enough to meet his needs. RD expects PO intake to improve.     Discussed the benefits to Petey and he does not want. Left product sheet for Petey in room.     Will follow up next week on rounds Monday. He did get outside food but did not eat yet. He did not ask for food, it was just provided.

## 2023-07-28 NOTE — PROGRESS NOTES
"                                                        [facility]                                  Skilled Nursing Facility                   Progress Note     Admit Date: 7/27/2023  FARHAT TBD  Principal Problem:  Bilateral primary osteoarthritis of knee   HPI obtained from patient interview and chart review     Chief Complaint: Establish Care/ Initial Visit     HPI:   Mr. Pearce is a 74-year-old male with PMHx of HLD, BPH, osteoarthritis who presents to SNF following hospitalization for bilateral primary osteoarthritis s/p bilateral total knee arthroplasty on 07/25 with Dr. Cox.  For secondary weakness and debility.    Patient originally evaluated in orthopedic clinic.  Ortho notes, Progressive pain deformity both knees.  This has been going on for years.  Intolerable at this point.  Had a hip replacement in the last year to his done well.  Hopefully same success on the knees.  Long discussion regarding bilateral versus unilateral with his age activity level and disposition elected for bilateral knee replacements." Patient was taken to the OR on 07/25 for bilateral total knee replacement.  No intraop complications noted, minimal EBL, skin closed with staples.  Patient admitted to PT/OT for continue strengthening.    Today, patient states his postoperative pain is not well controlled at this time.  He is currently wearing his polar ice device while sitting in a wheelchair.  He also states that his last bowel movement was on 07/24, the day prior to surgery.    Patient will be treated at [facility] with PT and OT to improve functional status and ability to perform ADLs.     Past Medical History: Patient has a past medical history of BPH (benign prostatic hyperplasia), Hair loss, Hyperlipidemia, and Osteoarthritis.    Past Surgical History: Patient has a past surgical history that includes Knee arthroscopy; Tonsillectomy; Hip replacement arthroplasty (Left, 5/7/2020); and Total knee arthroplasty " (Bilateral, 7/25/2023).    Social History: Patient reports that he quit smoking about 37 years ago. His smoking use included cigarettes. He has never used smokeless tobacco. He reports current alcohol use. He reports that he does not currently use drugs.    Family History: family history includes No Known Problems in his father and mother. He was adopted.    Allergies: Patient is allergic to ciprofloxacin and metronidazole.    ROS  Constitutional: Negative for fever   Eyes: Negative for blurred vision, double vision   Respiratory: Negative for cough, shortness of breath   Cardiovascular: Negative for chest pain, palpitations, and leg swelling.   Gastrointestinal: Negative for abdominal pain, constipation, diarrhea, nausea, vomiting.   Genitourinary: Negative for dysuria, frequency   Musculoskeletal:  + generalized weakness. Negative for back pain and myalgias.   Skin: Negative for itching and rash.   Neurological: Negative for dizziness, headaches.   Psychiatric/Behavioral: Negative for depression. The patient is not nervous/anxious.      24 hour Vital Sign Range   Temp:  [97.2 °F (36.2 °C)-98.9 °F (37.2 °C)]   Pulse:  [86-96]   Resp:  [16-20]   BP: (116-143)/(68-76)   SpO2:  [93 %-97 %]     Current BMI: Body mass index is 31.66 kg/m².    PEx  Constitutional: Patient appears debilitated.  No distress noted  HENT:   Head: Normocephalic and atraumatic.   Eyes: Pupils are equal, round  Neck: Normal range of motion. Neck supple.   Cardiovascular: Normal rate, regular rhythm and normal heart sounds.    Pulmonary/Chest: Effort normal and breath sounds are clear  Abdominal: Soft. Bowel sounds are normal.   Musculoskeletal: Normal range of motion.   Neurological: Alert and oriented to person, place, and time.   Psychiatric: Normal mood and affect. Behavior is normal.   Skin: Skin is warm and dry. Full skin assessment completed.  Surgical dressing to bilateral knees    No results for input(s): GLUCOSE, NA, K, CL, CO2, BUN,  CREATININE, MG in the last 24 hours.    Invalid input(s):  CALCIUM    No results for input(s): WBC, RBC, HGB, HCT, PLT, MCV, MCH, MCHC in the last 24 hours.    No results for input(s): POCTGLUCOSE in the last 168 hours.     Assessment and Plan:    Bilateral knee osteoarthritis  S/p bilateral TKA on 07/26  - PT/OT, WBAT  - DVT PPX with ASA 81 mg BID  - dressing to be changed on postop day 3 and every 3 days afterwards or PRN for saturation  - will contact [facility] to see when patient is postop appointment is- message sent to  to contact S.O.    Acute postoperative pain  - acetaminophen 650 mg q.6 hours PRN, Percocet 10/325 q.6 hours PRN    Generalized anxiety  - continue alprazolam 1 mg qHS. PRN    BPH  - continue finasteride 5 mg daily     HLD  - continue atorvastatin 40 mg qHS    GERD  - continue Protonix 40 mg daily    Debility   - Continue with PT/OT for gait training and strengthening and restoration of ADL's   - Encourage mobility, OOB in chair, and early ambulation as appropriate  - Fall precautions   - Monitor for bowel and bladder dysfunction  - Monitor for and prevent skin breakdown and pressure ulcers  - Continue DVT prophylaxis with ASA 81 mg BID    Anticipate disposition:  Home with home health      Follow-up needed during SNF stay-[facility]- finding out when appointment is    Follow-up needed after discharge from SNF: [facility]CP[facility]    No future appointments.      I certify that SNF services are required to be given on an inpatient basis because Mao Pearce needs for skilled nursing care and/or skilled rehabilitation are required on a daily basis and such services can only practically be provided in a skilled nursing facility setting and are for an ongoing condition for which she received inpatient care in the hospital.     Total time of the visit 117 minutes   Non physical exam/ non charting time: 68 minutes   Description of non physical exam/non  charting time: counseling patient on clinical conditions and therapies provided.  Extensive chart review completed including all consultation notes.  All pertinent laboratory and radiographical images reviewed.        Jenifer Jesus NP  Department of Hospital Medicine   [facility]     DOS: 7/28/2023       Patient note was created using MModal Dictation.  Any errors in syntax or even information may not have been identified and edited on initial review prior to signing this note.

## 2023-07-28 NOTE — PT/OT/SLP EVAL
"Occupational Therapy   Evaluation    Name: Mao Pearce  MRN: [id number]  Admit Date: 7/27/2023  Recent Surgeries: s/p b/l TKA     General Precautions: Standard, fall  Orthopedic Precautions:RLE weight bearing as tolerated, LLE weight bearing as tolerated   Braces: N/A    Recommendations:     Discharge Recommendations: home health OT  Level of Assistance Recommended: Intermittent assistance  and ~recommended; pt stating lack of assistance available, goals set accordingly~  Discharge Equipment Recommendations:  bedside commode, bath bench, walker, rolling, hip kit  Barriers to discharge:  Decreased caregiver support    Assessment:     Mao Pearce is a 77 y.o. male with a medical diagnosis of s/p b/l TKA. Pt tolerated session well and without incident and shows excellent motivation and potential to improve, but the pt continues to require assistance to perform self-care tasks and mobility. Pt strengths include UBD, Functional cognition, Following multi-step tasks, Attention to task, Grooming/hygiene, and UE strength and PLOF, Motivation, and Willingness to participate. However, pt would continue to benefit from cont'd OT services in the SNF setting to improve safety and independence /c functional tasks and ADLs upon discharge. Performance deficits affecting function: weakness, impaired endurance, impaired self care skills, impaired functional mobility, gait instability, impaired balance, decreased lower extremity function, decreased safety awareness, pain, decreased ROM, orthopedic precautions, impaired skin, decreased coordination.      Rehab Potential is excellent    Activity Tolerance: Excellent    Plan:     Patient to be seen 5 x/week to address the above listed problems via self-care/home management, community/work re-entry, therapeutic activities, therapeutic exercises, neuromuscular re-education  Plan of Care Expires: 08/26/23  Plan of Care Reviewed with: patient    Subjective     Chief Complaint: "I " "don't have anyone to take care of me when I get home. 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  Patient found supine with Other (comments) (BRENT Hose BLE) upon OT entry to room. Pt alert and agreeable to therapy tx.    Occupational Performance:     Bed Mobility:    Patient completed Supine to Sit with maximal assistance of 2 people, HOB raised, and use of bed rail    Functional Mobility/Transfers:  Patient completed Sit <> Stand Transfer with maximal assistance  with  rolling walker; Pt attempted x3, was only able to clear bed after 3rd attempt, c/o "stiffness" in BLE   Patient completed Bed <> Chair Transfer using Step Transfer technique with maximal assistance with rolling walker x 2    Activities of Daily Livin/28/23 0956   Prior Functioning: Everyday Activities   Self Care Independent   Indoor Mobility (Ambulation) Independent   Stairs Independent   Functional Cognition Independent   Prior Device Use None of the given options   Eating   Assistance Needed Independent   CARE Score - Eating 6   Oral Hygiene   Assistance Needed Independent   CARE Score - Oral Hygiene 6   Toileting Hygiene   Assistance Needed Physical assistance   Physical Assistance Level 51%-75%   Comment 2/2 LBD   CARE Score - Toileting Hygiene 2 "   Toileting Hygiene Discharge Goal   Discharge Goal 6   Shower/Bathe Self   Assistance Needed Physical assistance   Physical Assistance Level 25% or less   Comment seated for b/l lower legs/feet   CARE Score - Shower/Bathe Self 3   Upper Body Dressing   Assistance Needed Independent   CARE Score - Upper Body Dressing 6   Lower Body Dressing   Assistance Needed Physical assistance   Physical Assistance Level 76% or more   Comment Pt only able to assist adjusting around knees in sitting   CARE Score - Lower Body Dressing 2   Putting On/Taking Off Footwear   Assistance Needed Physical assistance   Physical Assistance Level Total assistance   Comment socks   CARE Score - Putting On/Taking Off Footwear 1   Toilet Transfer   Comment pt declined having to use toilet for BM, using urinal at bedside   Reason if not Attempted Activity not applicable   CARE Score - Toilet Transfer 9   Toilet Transfer Discharge Goal   Discharge Goal 6       Cognitive/Visual Perceptual:  Cognitive/Psychosocial Skills:  -       Oriented to: Person, Place, Time, and Situation   -       Follows Commands/attention:Follows multistep  commands  -       Communication: clear/fluent  -       Memory: No Deficits noted  -       Safety awareness/insight to disability: intact   -       Mood/Affect/Coping skills/emotional control: Anxious  Visual/Perceptual:  -Intact      Physical Exam:  Balance: -       Dynamic standing balance- Max A x2, Static Standing balance- Max A x2, Dynamic sitting balance- SPV, Static sitting balance- SPV  Postural examination/scapula alignment: -       No postural abnormalities identified  Skin integrity: Visible skin intact  Motor Planning: -       WFL  Dominant hand: -       R  Upper Extremity Range of Motion:  -       Right Upper Extremity: WNL  -       Left Upper Extremity: WFL  Upper Extremity Strength: -       Right Upper Extremity: WFL  -       Left Upper Extremity: WFL   Strength: -       Right Upper Extremity: WFL  -        Left Upper Extremity: WFL  Fine Motor Coordination: -       Intact  Gross motor coordination:   WFL    AMPAC 6 Click ADL:  AMPAC Total Score: 21    Treatment & Education:  Pt educated on POC, role of OT, safety, proper use of WC, and proper body mechanics for performing functional transfers. Pt performed tasks to improve safety and independence in functional tasks and ADLs as mentioned above.       Patient left up in chair with call button in reach and all needs met    GOALS:   Multidisciplinary Problems       Occupational Therapy Goals          Problem: Occupational Therapy    Goal Priority Disciplines Outcome Interventions   Occupational Therapy Goal     OT, PT/OT Ongoing, Progressing    Description: Goals to be met by: 8/26/2023     Patient will increase functional independence with ADLs by performing:    LE Dressing with Modified [place] and AE as needed.   Toileting from toilet with Modified [place] for hygiene and clothing management and AE as needed.   Bathing from  shower chair/bench with Modified [place] and AE as needed.   Supine to sit with Modified [place] and AE as needed.  Step transfer with Modified [place] and LRAD as needed/  Toilet transfer to toilet with Modified [place] and LRAD as needed.   Tolerate standing for ~10 minutes /c LRAD  Perform functional tasks in standing for ~10 min /c LRAD                         History:     Past Medical History:   Diagnosis Date    BPH (benign prostatic hyperplasia)     Hair loss     Hyperlipidemia     Osteoarthritis          Past Surgical History:   Procedure Laterality Date    HIP REPLACEMENT ARTHROPLASTY Left 5/7/2020    Procedure: ARTHROPLASTY, HIP REPLACEMENT;  Surgeon: Celestine Cox MD;  [facility];  Service: Orthopedics;  Laterality: Left;    KNEE ARTHROSCOPY      TONSILLECTOMY      TOTAL KNEE ARTHROPLASTY Bilateral 7/25/2023    Procedure: ARTHROPLASTY, KNEE, TOTAL REPLACEMENT;  Surgeon: Celestine Cox MD;   Location: [facility];  Service: Orthopedics;  Laterality: Bilateral;  ADDUCTOR BLOCK       Time Tracking:     OT Date of Treatment: 07/28/23  OT Start Time: 0859  OT Stop Time: 0944  OT Total Time (min): 45 min    Billable Minutes:Evaluation 15  Self Care/Home Management 30    7/28/2023

## 2023-07-28 NOTE — PLAN OF CARE
Evaluation completed. POC established.     Problem: Physical Therapy  Goal: Physical Therapy Goal  Description: Goals to be met by: 23     Patient will increase functional independence with mobility by performin. Supine to sit with [place]  2. Sit to supine with [place]  3. Rolling to Left and Right with [place]  4. Sit to stand transfer with Modified [place]  5. Bed to chair transfer with Modified [place] using Rolling Walker  6. Gait  x 150 feet with Modified [place] using Rolling Walker  7. Wheelchair propulsion x 150 feet with Modified [place] using bilateral upper extremities    Outcome: Ongoing, Progressing   2023

## 2023-07-28 NOTE — PT/OT/SLP EVAL
Physical Therapy Evaluation/Treatment Note    Patient Name:  Mao Pearce   MRN:  [id number]  Admit Date: 7/27/2023  Admitting Diagnosis: B primary OA of knee  Recent Surgeries: B TKA    General Precautions: Standard, fall   Orthopedic Precautions: RLE weight bearing as tolerated, LLE weight bearing as tolerated   Braces: N/A    Recommendations:     Discharge Recommendations: home health PT   Level of Assistance Recommended: Intermittent assistance   Discharge Equipment Recommendations: bedside commode, bath bench, walker, rolling, hip kit   Barriers to discharge: Decreased caregiver support (Increased assistance for mobility)    Assessment:     Mao Pearce is a 77 y.o. male admitted with a medical diagnosis of B primary OA of knee. Performance deficits affecting function  weakness, impaired endurance, impaired self care skills, impaired functional mobility, gait instability, impaired balance, decreased lower extremity function, decreased safety awareness, pain, decreased ROM, edema, orthopedic precautions. Patient agreeable to PT evaluation and treatment this PM. Patient reports increased B knee pain at start of session. Patient reports that NP to change medication orders for increased tolerance of mobility. Nurse made aware of pain. Patient reports being Independent with mobility and ADLs prior to recent surgical intervention. Patient currently functioning below baseline level of mobility requiring MaxA x 2 persons for sit to stand transfer, ModA for bed mobility and [place] for step transfer. Patient with decreased B knee joint ROM following surgery. Patient will benefit from continued SNF rehabilitation services to address deficits as well as progress mobility towards increased functional independence for safe transition to home environment at time of discharge.       Rehab Potential is excellent     Activity Tolerance: Good    Plan:     Patient to be seen 5 x/week to address the above listed problems via  "gait training, therapeutic activities, therapeutic exercises, neuromuscular re-education, wheelchair management/training     Plan of Care Expires: 08/27/23  Plan of Care Reviewed with: patient    Subjective     Chief Complaint: Pain in B knee joints  Patient/Family Comments/goals: "I have to do this by myself. I don't have help."  Pain/Comfort:  Pain Rating 1:  (Pain level not rated. "It's not a 10, but it is up there.")  Location - Side 1: Bilateral  Location - Orientation 1: generalized  Location 1: knee  Pain Addressed 1: Reposition, Distraction, Cessation of Activity, Nurse notified  Pain Rating Post-Intervention 1:  (Pain level not rated. Patient reports increased patient with mobility.)    Living Environment:   Patient resides home alone in condo with elevator access. Patient has a tub/shower combo.     Prior to admission, patients level of function was Independent with mobility and ADLs.  Equipment used at home: none .  DME owned (not currently used): none.  Upon discharge, patient will not have assistance.     Patients cultural, spiritual, Scientologist conflicts given the current situation: no    Objective:     Communicated with nursing staff prior to session.  Patient found up in chair with  (TIKA BRENT hose)  upon PT entry to room.    Exams:  Cognitive Exam:  Patient is oriented to Person, Place, Time, and Situation  RLE ROM: Decreased PROM/AROM; PROM knee flexion: 82 degrees/knee extension lag noted - not formally measured  RLE Strength: Pain limiting assessment; decreased AROM  LLE ROM: Decreased PROM/AROM; PROM knee flexion: 85 degrees/knee extension lag noted - not formally measured  LLE Strength: Pain limiting assessment; decreased AROM    Functional Mobility:     07/28/23 1312   Prior Functioning: Everyday Activities   Self Care Independent   Indoor Mobility (Ambulation) Independent   Stairs Independent   Functional Cognition Independent   Prior Device Use None of the given options   Roll Left and Right "   Assistance Needed Supervision   Physical Assistance Level No physical assistance   Comment SBA with HOB flat and use of bed rails   CARE Score - Roll Left and Right 4   Sit to Lying   Assistance Needed Physical assistance   Physical Assistance Level 26%-50%   Comment ModA with HOB flat   CARE Score - Sit to Lying 3   Lying to Sitting on Side of Bed   Assistance Needed Physical assistance   Physical Assistance Level 26%-50%   Comment ModA with HOB flat and use of bed rails   CARE Score - Lying to Sitting on Side of Bed 3   Sit to Stand   Assistance Needed Physical assistance   Physical Assistance Level Total assistance   Comment MaxA x 2 using RW; increased time to complete and decreased safety with transition of BUE onto walker from W/C   CARE Score - Sit to Stand 1   Sit to Stand Discharge Goal   Discharge Goal 6   Chair/Bed-to-Chair Transfer   Assistance Needed Physical assistance   Physical Assistance Level 25% or less   Comment Betsey using RW   CARE Score - Chair/Bed-to-Chair Transfer 3   Car Transfer   Reason if not Attempted Environmental limitations   CARE Score - Car Transfer 10   Walk 10 Feet   Assistance Needed Physical assistance   Physical Assistance Level 25% or less   Comment Patient ambulated 86 feet using RW with Betsey initially and progressing toward CGA with increased distance traveled as posture improved. W/C in tow.   CARE Score - Walk 10 Feet 3   Walk 50 Feet with Two Turns   Assistance Needed Physical assistance   Physical Assistance Level 25% or less   Comment Patient ambulated 86 feet using RW with [place] initially and progressing toward CGA with increased distance traveled as posture improved. W/C in tow.   CARE Score - Walk 50 Feet with Two Turns 3   Walk 150 Feet   Comment Pain and fatigue after 86 feet of ambulation.   Reason if not Attempted Safety concerns   CARE Score - Walk 150 Feet 88   Walking 10 Feet on Uneven Surfaces   Reason if not Attempted Safety concerns   CARE Score - Walking  10 Feet on Uneven Surfaces 88   1 Step (Curb)   Reason if not Attempted Safety concerns   CARE Score - 1 Step (Curb) 88   4 Steps   Reason if not Attempted Safety concerns   CARE Score - 4 Steps 88   12 Steps   Reason if not Attempted Safety concerns   CARE Score - 12 Steps 88   Picking Up Object   Reason if not Attempted Safety concerns   CARE Score - Picking Up Object 88   Uses a Wheelchair/Scooter?   Uses a Wheelchair/Scooter? Yes   Wheel 50 Feet with Two Turns   Assistance Needed Supervision   Physical Assistance Level No physical assistance   Comment Patient propelled W/C 150 feet using BUE with SBA.   CARE Score - Wheel 50 Feet with Two Turns 4   Type of Wheelchair/Scooter Manual   Wheel 150 Feet   Assistance Needed Supervision   Physical Assistance Level No physical assistance   Comment Patient propelled W/C 150 feet using BUE with SBA.   CARE Score - Wheel 150 Feet 4   Type of Wheelchair/Scooter Manual     Education:  Patient provided with daily orientation and goals of this PT session.  Patient educated to transfer to bedside chair/bedside commode/bathroom with RN/PCT present.   Patient educated on assistive device use, bed mobility training, Fall risk, gait training, home safety, Home exercise program, plan of care, TKA protocol, and transfer training by explanation and demonstration.   Patient Verbalized Understanding and Demonstrated Understanding .  Time provided for therapeutic counseling and discussion of current health disposition. All questions answered to satisfaction, within scope of PT practice; voiced no other concerns. White board updated in patient's room, nursing staff notified of session.      Therapeutic Activities and Exercises:   Gentle PROM performed to BLE while patient seated in W/C. Limited by pain. Patient educated on completion of ankle DF/PF while supine in bed to promote muscle contraction and LE circulation. Patient also instructed on quad sets to complete while supine in bed to  promote increased B knee extension.    AM-PAC 6 CLICK MOBILITY  Total Score:14     Patient left supine with call button in reach and nursing staff notified.    GOALS:   Multidisciplinary Problems       Physical Therapy Goals          Problem: Physical Therapy    Goal Priority Disciplines Outcome Goal Variances Interventions   Physical Therapy Goal     PT, PT/OT Ongoing, Progressing     Description: Goals to be met by: 23     Patient will increase functional independence with mobility by performin. Supine to sit with [place]  2. Sit to supine with [place]  3. Rolling to Left and Right with [place]  4. Sit to stand transfer with Modified [place]  5. Bed to chair transfer with Modified [place] using Rolling Walker  6. Gait  x 150 feet with Modified [place] using Rolling Walker  7. Wheelchair propulsion x 150 feet with Modified [place] using bilateral upper extremities                         History:     Past Medical History:   Diagnosis Date    BPH (benign prostatic hyperplasia)     Hair loss     Hyperlipidemia     Osteoarthritis        Past Surgical History:   Procedure Laterality Date    HIP REPLACEMENT ARTHROPLASTY Left 2020    Procedure: ARTHROPLASTY, HIP REPLACEMENT;  Surgeon: Celestine Cox MD;  Location: [facility];  Service: Orthopedics;  Laterality: Left;    KNEE ARTHROSCOPY      TONSILLECTOMY      TOTAL KNEE ARTHROPLASTY Bilateral 2023    Procedure: ARTHROPLASTY, KNEE, TOTAL REPLACEMENT;  Surgeon: Celestine Cox MD;  Location: [facility];  Service: Orthopedics;  Laterality: Bilateral;  ADDUCTOR BLOCK       Time Tracking:     PT Received On: 23  PT Start Time: 1312     PT Stop Time: 1405  PT Total Time (min): 53 min     Billable Minutes: Evaluation 28, Therapeutic Activity 15, and Therapeutic Exercise 10      2023

## 2023-07-28 NOTE — CONSULTS
[facility] - Skilled Nursing  Adult Nutrition  Consult Note    SUMMARY   Recommendations  Continue regular diet, instruct on diet and precautions for GERD, encourage intake. If PO not up to 75%, add boost plus BID,RD following  Goals: PO intake to meet increased protein needs for healing by next RD follow up  Nutrition Goal Status: new  Communication of RD Recs: other (comment)    Assessment and Plan  Inadequate oral intake in the presence of increased needs post op related to decreased appetite and constipation as evidenced by patient reporting he usually eats only 2 meals per day , is in pain and is constipated.     New    Plan  General diet  Nutrition education- protein needs, diet for GERD  Collaboration with other providers    Malnutrition Assessment 7/28     Skin (Micronutrient): wounds unhealed, edema, bruised           Orbital Region (Subcutaneous Fat Loss): moderate depletion  Upper Arm Region (Subcutaneous Fat Loss): well nourished  Thoracic and Lumbar Region: well nourished   [place] Region (Muscle Loss): mild depletion  Clavicle Bone Region (Muscle Loss): well nourished  Clavicle and Acromion Bone Region (Muscle Loss): well nourished  Scapular Bone Region (Muscle Loss): well nourished  Dorsal Hand (Muscle Loss): mild depletion                 Reason for Assessment    Reason For Assessment: consult  Diagnosis:  (bilateral TKR)  Relevant Medical History: OA, BPH, GERD, HLD  Interdisciplinary Rounds: did not attend  General Information Comments: Patient is not yet walking post op. eating but not eating enough to meet needs, some outside food. PO variable %. Discussed diet for GERD as patient has had symptoms recently and had eliminated all fruits from diet. RD will provide written instructions for GERD later today. Pt off to theapy. No chewing or swallowing problems. regular diet. patient lives alone and does not cook, so he eats two meals per day and evening meal is in restaurant with two glasses  of wine.  Nutrition Discharge Planning: DC on regular diet, high protein for 6 weeks    Nutrition Risk Screen    Nutrition Risk Screen: large or nonhealing wound, burn or pressure injury    Nutrition/Diet History    Patient Reported Diet/Restrictions/Preferences: general  Typical Food/Fluid Intake: two meals only  Spiritual, Cultural Beliefs, Jehovah's witness Practices, Values that Affect Care: no  Food Allergies: NKFA  Factors Affecting Nutritional Intake: decreased appetite, pain    Anthropometrics    Temp: 97.9 °F (36.6 °C)  Height Method: Stated  Height: 6' (182.9 cm)  Height (inches): 72 in  Weight Method: Bed Scale  Weight: 105.9 kg (233 lb 7.5 oz)  Weight (lb): 233.47 lb  Ideal Body Weight (IBW), Male: 178 lb  % Ideal Body Weight, Male (lb): 131.16 %  BMI (Calculated): 31.7  BMI Grade: 30 - 34.9- obesity - grade I  Usual Body Weight (UBW), k.5 kg  % Usual Body Weight: 103.53  % Weight Change From Usual Weight: 3.32 %       Lab/Procedures/Meds    Pertinent Labs Reviewed: reviewed  Pertinent Labs Comments: Hg 10.7, Hct 30.6, Na 135, gluose 139  Pertinent Medications Reviewed: reviewed  Pertinent Medications Comments: ASA, pantoprazole, senna-docusate, statin,    Estimated/Assessed Needs    Weight Used For Calorie Calculations: 102.5 kg (225 lb 15.5 oz)  Energy Calorie Requirements (kcal): 6682-1241  Energy Need Method: [place]-St Jeor (x 1.25-1.3 (PAL))  Protein Requirements: 97  Weight Used For Protein Calculations: 80.9 kg (178 lb 5.6 oz) (IBW 2/2 obesity)  Fluid Requirements (mL): 2331 or per MD  Estimated Fluid Requirement Method: RDA Method  RDA Method (mL): 2331  CHO Requirement: -      Nutrition Prescription Ordered    Current Diet Order: Regular  Nutrition Order Comments: Patient informed of increased needs for healing, he is not eating enough but currently is constipated and is anticipating an enema later today    Evaluation of Received Nutrient/Fluid Intake    I/O: no data  Energy Calories Required:  not meeting needs  Protein Required: not meeting needs  Fluid Required: meeting needs  Comments: LBM 7/24  Tolerance: tolerating  % Intake of Estimated Energy Needs: 50 - 75 %  % Meal Intake: 50 - 75 %    Nutrition Risk    Level of Risk/Frequency of Follow-up: low (one time per week)       Monitor and Evaluation    Food and Nutrient Intake: food and beverage intake  Food and Nutrient Adminstration: diet order  Knowledge/Beliefs/Attitudes: beliefs and attitudes, food and nutrition knowledge/skill  Physical Activity and Function: nutrition-related ADLs and IADLs  Anthropometric Measurements: weight change  Biochemical Data, Medical Tests and Procedures: gastrointestinal profile, electrolyte and renal panel  Nutrition-Focused Physical Findings: extremities, muscles and bones       Nutrition Follow-Up    RD Follow-up?: Yes

## 2023-07-28 NOTE — PLAN OF CARE
Problem: Occupational Therapy  Goal: Occupational Therapy Goal  Description: Goals to be met by: 8/26/2023     Patient will increase functional independence with ADLs by performing:    LE Dressing with Modified [place] and AE as needed.   Toileting from toilet with Modified [place] for hygiene and clothing management and AE as needed.   Bathing from  shower chair/bench with Modified [place] and AE as needed.   Supine to sit with Modified [place] and AE as needed.  Step transfer with Modified [place] and LRAD as needed/  Toilet transfer to toilet with Modified [place] and LRAD as needed.   Tolerate standing for ~10 minutes /c LRAD  Perform functional tasks in standing for ~10 min /c LRAD    Outcome: Ongoing, Progressing    Pt evaluated and goals set based on performance at admission.

## 2023-07-28 NOTE — PLAN OF CARE
Continue regular diet, instruct on diet and precautions for GERD, encourage intake. If PO not up to 75%, add boost plus BID,RD following  Goals: PO intake to meet increased protein needs for healing by next RD follow up  Nutrition Goal Status: new  Communication of RD Recs: other (comment)     Assessment and Plan  Inadequate oral intake in the presence of increased needs post op related to decreased appetite and constipation as evidenced by patient reporting he usually eats only 2 meals per day , is in pain and is constipated.      New     Plan  General diet  Nutrition education- protein needs, diet for GERD  Collaboration with other providers

## 2023-07-29 PROBLEM — Z96.653 STATUS POST TOTAL BILATERAL KNEE REPLACEMENT: Status: ACTIVE | Noted: 2023-07-29

## 2023-07-29 PROCEDURE — 99305 1ST NF CARE MODERATE MDM 35: CPT | Mod: AI,,, | Performed by: HOSPITALIST

## 2023-07-29 PROCEDURE — 97116 GAIT TRAINING THERAPY: CPT | Mod: CQ

## 2023-07-29 PROCEDURE — 11000004 HC SNF PRIVATE

## 2023-07-29 PROCEDURE — 97110 THERAPEUTIC EXERCISES: CPT | Mod: CQ

## 2023-07-29 PROCEDURE — 25000003 PHARM REV CODE 250: Performed by: HOSPITALIST

## 2023-07-29 PROCEDURE — 99305 PR NURSING FACILITY CARE, INIT, MOD SEVERITY: ICD-10-PCS | Mod: AI,,, | Performed by: HOSPITALIST

## 2023-07-29 PROCEDURE — 97530 THERAPEUTIC ACTIVITIES: CPT | Mod: CQ

## 2023-07-29 PROCEDURE — 25000003 PHARM REV CODE 250: Performed by: NURSE PRACTITIONER

## 2023-07-29 RX ADMIN — OXYCODONE HYDROCHLORIDE 10 MG: 10 TABLET ORAL at 03:07

## 2023-07-29 RX ADMIN — SENNOSIDES AND DOCUSATE SODIUM 1 TABLET: 50; 8.6 TABLET ORAL at 09:07

## 2023-07-29 RX ADMIN — ALUMINUM HYDROXIDE, MAGNESIUM HYDROXIDE, AND DIMETHICONE 30 ML: 400; 400; 40 SUSPENSION ORAL at 11:07

## 2023-07-29 RX ADMIN — OXYCODONE HYDROCHLORIDE 10 MG: 10 TABLET ORAL at 04:07

## 2023-07-29 RX ADMIN — OXYCODONE HYDROCHLORIDE 10 MG: 10 TABLET ORAL at 11:07

## 2023-07-29 RX ADMIN — ACETAMINOPHEN 1000 MG: 500 TABLET ORAL at 09:07

## 2023-07-29 RX ADMIN — FINASTERIDE 5 MG: 5 TABLET, FILM COATED ORAL at 09:07

## 2023-07-29 RX ADMIN — ASPIRIN 81 MG 81 MG: 81 TABLET ORAL at 09:07

## 2023-07-29 RX ADMIN — OXYCODONE HYDROCHLORIDE 10 MG: 10 TABLET ORAL at 09:07

## 2023-07-29 RX ADMIN — Medication 6 MG: at 09:07

## 2023-07-29 RX ADMIN — ALPRAZOLAM 1 MG: 0.5 TABLET ORAL at 11:07

## 2023-07-29 RX ADMIN — METHOCARBAMOL 500 MG: 500 TABLET ORAL at 09:07

## 2023-07-29 RX ADMIN — ACETAMINOPHEN 1000 MG: 500 TABLET ORAL at 05:07

## 2023-07-29 RX ADMIN — POLYETHYLENE GLYCOL 3350 17 G: 17 POWDER, FOR SOLUTION ORAL at 09:07

## 2023-07-29 RX ADMIN — METHOCARBAMOL 500 MG: 500 TABLET ORAL at 12:07

## 2023-07-29 RX ADMIN — ACETAMINOPHEN 1000 MG: 500 TABLET ORAL at 01:07

## 2023-07-29 RX ADMIN — PANTOPRAZOLE SODIUM 40 MG: 40 TABLET, DELAYED RELEASE ORAL at 09:07

## 2023-07-29 RX ADMIN — DOCUSATE SODIUM 1 ENEMA: 283 LIQUID RECTAL at 05:07

## 2023-07-29 RX ADMIN — METHOCARBAMOL 500 MG: 500 TABLET ORAL at 05:07

## 2023-07-29 RX ADMIN — ATORVASTATIN CALCIUM 40 MG: 40 TABLET, FILM COATED ORAL at 09:07

## 2023-07-29 NOTE — PT/OT/SLP PROGRESS
"Physical Therapy Treatment    Patient Name:  Mao Pearce   MRN:  [id number]  Admit Date: 7/27/2023  Admitting Diagnosis: Bilateral primary osteoarthritis of knee  Recent Surgeries:     General Precautions: Standard, fall  Orthopedic Precautions: RLE weight bearing as tolerated, LLE weight bearing as tolerated  Braces: N/A    Recommendations:     Discharge Recommendations: home health PT  Level of Assistance Recommended at Discharge: Intermittent assistance   Discharge Equipment Recommendations: bedside commode, bath bench, walker, rolling, hip kit  Barriers to discharge: Decreased caregiver support (Increased assistance for mobility)    Assessment:     Mao Pearce is a 77 y.o. male admitted with a medical diagnosis of Bilateral primary osteoarthritis of knee . Pt tolerated fairly well, demo good effort despite subj c/o pain, highly motivated, receptive to education, pt would continue to benefit from skilled PT services to improve overall functional mobility, strength and endurance.  .      Performance deficits affecting function: weakness, impaired endurance, impaired self care skills, impaired functional mobility, gait instability, impaired balance, decreased lower extremity function, decreased safety awareness, pain, decreased ROM, edema, orthopedic precautions.    Rehab Potential is good    Activity Tolerance: Fair    Plan:     Patient to be seen 5 x/week to address the above listed problems via gait training, therapeutic activities, therapeutic exercises, neuromuscular re-education, wheelchair management/training    Plan of Care Expires: 08/27/23  Plan of Care Reviewed with: patient    Subjective     "I'm gonna do what ever I need to do to get home".     Pain/Comfort:  Pain Rating 1: 8/10  Location - Side 1: Bilateral  Location - Orientation 1: generalized  Location 1: knee  Pain Addressed 1: Pre-medicate for activity, Reposition, Distraction, Cessation of Activity, Nurse notified  Pain Rating " Post-Intervention 1:  (inc with mobility/exs did not rate, ed within tolerance)    Patient's cultural, spiritual, Jew conflicts given the current situation:  no    Objective:     Communicated with pt prior to session.to arrange time for session after pain meds  Patient found  with   upon PT entry to room.     Therapeutic Activities and Exercises: 2x10 reps BLE  AP, GS, x 10 reps QS (pt repts doing AP,QS this morning, will do every day),  x 15 reps (10-5) LAQ, hip flex and knee flex with cloth on floor ed on tech/ROM all with in tolerance    Functional Mobility:  Transfers:     Sit to Stand:  minimum assistance and of 1-2 persons with rolling walker and vcs for safety/tech, inc time  Gait: amb with RW min/CGA ~ 100 ft and 50 ft seated rest break no LOB, inc BUE support, vcs for posture, slow mica, slight step through gait pattern, RW fairly continous and vcs for heels strike to facilitate knee extension with contact    AM-PAC 6 CLICK MOBILITY  15    Patient left up in chair with call button in reach and tech brought back to room .    GOALS:   Multidisciplinary Problems       Physical Therapy Goals          Problem: Physical Therapy    Goal Priority Disciplines Outcome Goal Variances Interventions   Physical Therapy Goal     PT, PT/OT Ongoing, Progressing     Description: Goals to be met by: 23     Patient will increase functional independence with mobility by performin. Supine to sit with [place]  2. Sit to supine with [place]  3. Rolling to Left and Right with [place]  4. Sit to stand transfer with Modified [place]  5. Bed to chair transfer with Modified [place] using Rolling Walker  6. Gait  x 150 feet with Modified [place] using Rolling Walker  7. Wheelchair propulsion x 150 feet with Modified [place] using bilateral upper extremities                         Time Tracking:     PT Received On: 23  PT Start Time: 1030  PT Stop Time: 1110  PT Total Time  (min): 40 min    Billable Minutes: Gait Training 15, Therapeutic Activity 10, and Therapeutic Exercise 15    Treatment Type: Treatment  PT/PTA: PTA     Number of PTA visits since last PT visit: 1 07/29/2023

## 2023-07-30 PROCEDURE — 25000003 PHARM REV CODE 250: Performed by: HOSPITALIST

## 2023-07-30 PROCEDURE — 11000004 HC SNF PRIVATE

## 2023-07-30 PROCEDURE — 25000003 PHARM REV CODE 250: Performed by: NURSE PRACTITIONER

## 2023-07-30 RX ADMIN — POLYETHYLENE GLYCOL 3350 17 G: 17 POWDER, FOR SOLUTION ORAL at 10:07

## 2023-07-30 RX ADMIN — METHOCARBAMOL 500 MG: 500 TABLET ORAL at 09:07

## 2023-07-30 RX ADMIN — OXYCODONE HYDROCHLORIDE 10 MG: 10 TABLET ORAL at 08:07

## 2023-07-30 RX ADMIN — OXYCODONE HYDROCHLORIDE 10 MG: 10 TABLET ORAL at 09:07

## 2023-07-30 RX ADMIN — OXYCODONE HYDROCHLORIDE 10 MG: 10 TABLET ORAL at 02:07

## 2023-07-30 RX ADMIN — METHOCARBAMOL 500 MG: 500 TABLET ORAL at 01:07

## 2023-07-30 RX ADMIN — OXYCODONE HYDROCHLORIDE 10 MG: 10 TABLET ORAL at 03:07

## 2023-07-30 RX ADMIN — OXYCODONE HYDROCHLORIDE 10 MG: 10 TABLET ORAL at 06:07

## 2023-07-30 RX ADMIN — SENNOSIDES AND DOCUSATE SODIUM 1 TABLET: 50; 8.6 TABLET ORAL at 09:07

## 2023-07-30 RX ADMIN — FINASTERIDE 5 MG: 5 TABLET, FILM COATED ORAL at 10:07

## 2023-07-30 RX ADMIN — PANTOPRAZOLE SODIUM 40 MG: 40 TABLET, DELAYED RELEASE ORAL at 10:07

## 2023-07-30 RX ADMIN — ACETAMINOPHEN 1000 MG: 500 TABLET ORAL at 01:07

## 2023-07-30 RX ADMIN — Medication 6 MG: at 09:07

## 2023-07-30 RX ADMIN — ASPIRIN 81 MG 81 MG: 81 TABLET ORAL at 10:07

## 2023-07-30 RX ADMIN — SENNOSIDES AND DOCUSATE SODIUM 1 TABLET: 50; 8.6 TABLET ORAL at 10:07

## 2023-07-30 RX ADMIN — METHOCARBAMOL 500 MG: 500 TABLET ORAL at 10:07

## 2023-07-30 RX ADMIN — ACETAMINOPHEN 1000 MG: 500 TABLET ORAL at 05:07

## 2023-07-30 NOTE — H&P
"[facility]  Skilled Nursing Facility   History and Physical Exam      Date of Service: 07/29/2023      Patient Name: Mao Pearce  MRN: [id number]  Admission Date: 7/27/2023  Attending Physician: Tomi Gomez MD  Primary Care Provider: Primary Doctor No  Code Status: Full Code      Principal problem:  Bilateral primary osteoarthritis of knee      Chief Complaint:   Chief Complaint   Patient presents with    Knee Pain     Admitted to OSNF for rehabilitation following hospital stay for bilateral total knee arthroplasties.           HPI:   "Mr. Pearce is a 74-year-old male with PMHx of HLD, BPH, osteoarthritis who presents to SNF following hospitalization for bilateral primary osteoarthritis s/p bilateral total knee arthroplasty on 07/25 with Dr. Cox.  For secondary weakness and debility.     Patient originally evaluated in orthopedic clinic.  Ortho notes, Progressive pain deformity both knees.  This has been going on for years.  Intolerable at this point.  Had a hip replacement in the last year to his done well.  Hopefully same success on the knees.  Long discussion regarding bilateral versus unilateral with his age activity level and disposition elected for bilateral knee replacements." Patient was taken to the OR on 07/25 for bilateral total knee replacement.  No intraop complications noted, minimal EBL, skin closed with staples.  Patient admitted to PT/OT for continue strengthening.     Today, patient states his postoperative pain is not well controlled at this time.  He is currently wearing his polar ice device while sitting in a wheelchair.  He also states that his last bowel movement was on 07/24, the day prior to surgery." Per Jenifer Jesus NP on 7/28/23.     He is doing well. He is working more with therapy and is doing everything he can to get stronger again. He says that his pain is controlled with the current medication.     Patient admitted with skilled services with PT and OT to improve " functional status and ability to perform ADLs.       Past Medical History:   Past Medical History:   Diagnosis Date    BPH (benign prostatic hyperplasia)     Hair loss     Hyperlipidemia     Osteoarthritis        Past Surgical History:   Past Surgical History:   Procedure Laterality Date    HIP REPLACEMENT ARTHROPLASTY Left 2020    Procedure: ARTHROPLASTY, HIP REPLACEMENT;  Surgeon: Celestine Cox MD;  Location: [facility];  Service: Orthopedics;  Laterality: Left;    KNEE ARTHROSCOPY      TONSILLECTOMY      TOTAL KNEE ARTHROPLASTY Bilateral 2023    Procedure: ARTHROPLASTY, KNEE, TOTAL REPLACEMENT;  Surgeon: Celestine Cox MD;  Location: [facility];  Service: Orthopedics;  Laterality: Bilateral;  ADDUCTOR BLOCK       Social History:   Tobacco Use    Smoking status: Former     Current packs/day: 0.00     Types: Cigarettes     Quit date: 1986     Years since quittin.5    Smokeless tobacco: Never   Substance and Sexual Activity    Alcohol use: Yes     Comment: none 24 hr prior to surgery    Drug use: Not Currently    Sexual activity: Not on file       Family History:   Family History       Problem Relation (Age of Onset)    No Known Problems Mother, Father            No current facility-administered medications on file prior to encounter.     Current Outpatient Medications on File Prior to Encounter   Medication Sig    ALPRAZolam (XANAX) 1 MG tablet TK 1 T PO QHS PRN    atorvastatin (LIPITOR) 40 MG tablet Take 40 mg by mouth every evening.    finasteride (PROSCAR) 5 mg tablet TK 1/2 T PO QD    oxyCODONE-acetaminophen (PERCOCET)  mg per tablet Take 1 tablet by mouth every 6 (six) hours as needed. (Patient not taking: Reported on 2021)    pantoprazole (PROTONIX) 40 MG tablet        Allergies:   Review of patient's allergies indicates:   Allergen Reactions    Ciprofloxacin Rash    Metronidazole Rash       ROS:  Review of Systems   Constitutional:  Negative for appetite change and fever.    Respiratory:  Negative for cough and shortness of breath.    Cardiovascular:  Negative for chest pain and palpitations.   Gastrointestinal:  Negative for abdominal pain, nausea and vomiting.   Genitourinary:  Negative for dysuria.   Musculoskeletal:  Positive for arthralgias. Negative for back pain.   Neurological:  Positive for weakness. Negative for dizziness and light-headedness.   Psychiatric/Behavioral:  Negative for sleep disturbance. The patient is not nervous/anxious.      Objective:  Temp:  [97.8 °F (36.6 °C)-98 °F (36.7 °C)]   Pulse:  [81-90]   Resp:  [18]   BP: (116-131)/(64-79)   SpO2:  [96 %-98 %]     Body mass index is 31.66 kg/m².      Physical Exam  Vitals and nursing note reviewed.   Constitutional:       General: He is not in acute distress.     Appearance: He is well-developed. He is not diaphoretic.   Cardiovascular:      Rate and Rhythm: Normal rate and regular rhythm.      Heart sounds: S1 normal and S2 normal. No murmur heard.  Pulmonary:      Effort: Pulmonary effort is normal. No respiratory distress.      Breath sounds: Normal breath sounds. No wheezing or rales.   Abdominal:      General: Bowel sounds are normal. There is no distension.      Palpations: Abdomen is soft.      Tenderness: There is no abdominal tenderness.   Musculoskeletal:      Right knee: Swelling present. Decreased range of motion.      Left knee: Swelling present. Decreased range of motion.      Right lower leg: No edema.      Left lower leg: No edema.      Comments: Polar ice present on right knee. Bilateral knee braces in place.    Skin:     General: Skin is warm and dry.   Neurological:      Mental Status: He is alert and oriented to person, place, and time.   Psychiatric:         Mood and Affect: Mood normal.         Behavior: Behavior normal. Behavior is cooperative.         Thought Content: Thought content normal.         Significant Labs:   CBC:  Recent Labs   Lab 07/27/23  0425   WBC 7.10   HGB 10.7*   HCT  30.6*   PLT 68*   MCV 91     BMP:  Recent Labs   Lab 07/27/23  0425   *   *   K 3.9      CO2 26   BUN 9   CREATININE 0.8   CALCIUM 8.7       Significant Imaging: I have reviewed all pertinent imaging results/findings completed during prior hospitalization.      Assessment and Plan:  Active Diagnoses:    Diagnosis Date Noted POA    PRINCIPAL PROBLEM:  Bilateral primary osteoarthritis of knee [M17.0] 07/25/2023 Yes    Status post total bilateral knee replacement [Z96.653] 07/29/2023 Not Applicable    BPH (benign prostatic hyperplasia) [N40.0] 07/28/2023 Yes    Debility [R53.81] 07/28/2023 Yes    Arteriosclerosis of coronary artery [I25.10] 07/28/2023 Yes    Hyperlipidemia [E78.5] 03/08/2023 Yes    Anxiety [F41.9] 03/08/2023 Yes      Problems Resolved During this Admission:       Bilateral knee osteoarthritis  S/p bilateral TKA on 07/26  - PT/OT, WBAT  - DVT PPX with ASA 81 mg BID  - dressing to be changed on postop day 3 and every 3 days afterwards or PRN for saturation  - Contact [facility] to see when patient is postop appointment is scheduled.   - Continue icing. Will start using the PROM machine this week.    - Continue acetaminophen 650 mg q.6 hours PRN, Percocet 10/325 q.6 hours PRN     Generalized anxiety  - continue alprazolam 1 mg qHS. PRN     BPH  - continue finasteride 5 mg daily     HLD  - continue atorvastatin 40 mg qHS     GERD  - continue Protonix 40 mg daily     Debility   - Continue with PT/OT for gait training and strengthening and restoration of ADL's   - Encourage mobility, OOB in chair, and early ambulation as appropriate  - Fall precautions   - Monitor for bowel and bladder dysfunction  - Monitor for and prevent skin breakdown and pressure ulcers  - Continue DVT prophylaxis with ASA 81 mg BID      IP OHS RISK OF UNPLANNED READMISSION Model: Low      Anticipated Disposition:  Home with home health      No future appointments.    I certify that SNF services are required  to be given on an inpatient basis because Mao Pearce needs for skilled nursing care and/or skilled rehabilitation are required on a daily basis and such services can only practically be provided in a skilled nursing facility setting and are for an ongoing condition for which she received inpatient care in the hospital.       Tomi Gomez MD  Department of Hospital Medicine   [facility] - Skilled Nursing

## 2023-07-30 NOTE — NURSING
Pt was advised earlier when asked to not take OTC medications that he has brought with him.  When doing rounds, pt stated he had heartburn and pain and witnessed anxiety. Pt was admitted medications for the above said s/sx. Informed charge nurse.

## 2023-07-31 LAB
ANION GAP SERPL CALC-SCNC: 11 MMOL/L (ref 8–16)
BASOPHILS # BLD AUTO: 0.02 K/UL (ref 0–0.2)
BASOPHILS NFR BLD: 0.4 % (ref 0–1.9)
BUN SERPL-MCNC: 12 MG/DL (ref 8–23)
CALCIUM SERPL-MCNC: 8.9 MG/DL (ref 8.7–10.5)
CHLORIDE SERPL-SCNC: 97 MMOL/L (ref 95–110)
CO2 SERPL-SCNC: 28 MMOL/L (ref 23–29)
CREAT SERPL-MCNC: 0.7 MG/DL (ref 0.5–1.4)
DIFFERENTIAL METHOD: ABNORMAL
EOSINOPHIL # BLD AUTO: 0.2 K/UL (ref 0–0.5)
EOSINOPHIL NFR BLD: 3.5 % (ref 0–8)
ERYTHROCYTE [DISTWIDTH] IN BLOOD BY AUTOMATED COUNT: 11.9 % (ref 11.5–14.5)
EST. GFR  (NO RACE VARIABLE): >60 ML/MIN/1.73 M^2
GLUCOSE SERPL-MCNC: 115 MG/DL (ref 70–110)
HCT VFR BLD AUTO: 28.8 % (ref 40–54)
HGB BLD-MCNC: 9.9 G/DL (ref 14–18)
IMM GRANULOCYTES # BLD AUTO: 0.03 K/UL (ref 0–0.04)
IMM GRANULOCYTES NFR BLD AUTO: 0.6 % (ref 0–0.5)
LYMPHOCYTES # BLD AUTO: 0.8 K/UL (ref 1–4.8)
LYMPHOCYTES NFR BLD: 16.2 % (ref 18–48)
MAGNESIUM SERPL-MCNC: 2 MG/DL (ref 1.6–2.6)
MCH RBC QN AUTO: 31.4 PG (ref 27–31)
MCHC RBC AUTO-ENTMCNC: 34.4 G/DL (ref 32–36)
MCV RBC AUTO: 91 FL (ref 82–98)
MONOCYTES # BLD AUTO: 0.5 K/UL (ref 0.3–1)
MONOCYTES NFR BLD: 9.4 % (ref 4–15)
NEUTROPHILS # BLD AUTO: 3.6 K/UL (ref 1.8–7.7)
NEUTROPHILS NFR BLD: 69.9 % (ref 38–73)
NRBC BLD-RTO: 0 /100 WBC
PHOSPHATE SERPL-MCNC: 2.9 MG/DL (ref 2.7–4.5)
PLATELET # BLD AUTO: 117 K/UL (ref 150–450)
PMV BLD AUTO: 10.8 FL (ref 9.2–12.9)
POTASSIUM SERPL-SCNC: 3.5 MMOL/L (ref 3.5–5.1)
RBC # BLD AUTO: 3.15 M/UL (ref 4.6–6.2)
SODIUM SERPL-SCNC: 136 MMOL/L (ref 136–145)
WBC # BLD AUTO: 5.2 K/UL (ref 3.9–12.7)

## 2023-07-31 PROCEDURE — 97530 THERAPEUTIC ACTIVITIES: CPT | Mod: CO

## 2023-07-31 PROCEDURE — 80048 BASIC METABOLIC PNL TOTAL CA: CPT | Performed by: HOSPITALIST

## 2023-07-31 PROCEDURE — 97116 GAIT TRAINING THERAPY: CPT

## 2023-07-31 PROCEDURE — 11000004 HC SNF PRIVATE

## 2023-07-31 PROCEDURE — 83735 ASSAY OF MAGNESIUM: CPT | Performed by: HOSPITALIST

## 2023-07-31 PROCEDURE — 36415 COLL VENOUS BLD VENIPUNCTURE: CPT | Performed by: HOSPITALIST

## 2023-07-31 PROCEDURE — 97530 THERAPEUTIC ACTIVITIES: CPT

## 2023-07-31 PROCEDURE — 25000003 PHARM REV CODE 250: Performed by: HOSPITALIST

## 2023-07-31 PROCEDURE — 94761 N-INVAS EAR/PLS OXIMETRY MLT: CPT

## 2023-07-31 PROCEDURE — 84100 ASSAY OF PHOSPHORUS: CPT | Performed by: HOSPITALIST

## 2023-07-31 PROCEDURE — 97110 THERAPEUTIC EXERCISES: CPT

## 2023-07-31 PROCEDURE — 85025 COMPLETE CBC W/AUTO DIFF WBC: CPT | Performed by: HOSPITALIST

## 2023-07-31 PROCEDURE — 25000003 PHARM REV CODE 250: Performed by: NURSE PRACTITIONER

## 2023-07-31 PROCEDURE — 97110 THERAPEUTIC EXERCISES: CPT | Mod: CO

## 2023-07-31 RX ORDER — LACTULOSE 10 G/15ML
20 SOLUTION ORAL EVERY 6 HOURS PRN
Status: DISCONTINUED | OUTPATIENT
Start: 2023-07-31 | End: 2023-08-08

## 2023-07-31 RX ORDER — BISACODYL 10 MG
10 SUPPOSITORY, RECTAL RECTAL ONCE
Status: COMPLETED | OUTPATIENT
Start: 2023-07-31 | End: 2023-07-31

## 2023-07-31 RX ADMIN — OXYCODONE HYDROCHLORIDE 10 MG: 10 TABLET ORAL at 09:07

## 2023-07-31 RX ADMIN — OXYCODONE HYDROCHLORIDE 10 MG: 10 TABLET ORAL at 03:07

## 2023-07-31 RX ADMIN — SENNOSIDES AND DOCUSATE SODIUM 1 TABLET: 50; 8.6 TABLET ORAL at 08:07

## 2023-07-31 RX ADMIN — OXYCODONE HYDROCHLORIDE 10 MG: 10 TABLET ORAL at 04:07

## 2023-07-31 RX ADMIN — POLYETHYLENE GLYCOL 3350 17 G: 17 POWDER, FOR SOLUTION ORAL at 10:07

## 2023-07-31 RX ADMIN — BISACODYL 10 MG: 10 SUPPOSITORY RECTAL at 04:07

## 2023-07-31 RX ADMIN — Medication 6 MG: at 09:07

## 2023-07-31 RX ADMIN — LACTULOSE 20 G: 20 SOLUTION ORAL at 11:07

## 2023-07-31 RX ADMIN — ATORVASTATIN CALCIUM 40 MG: 40 TABLET, FILM COATED ORAL at 09:07

## 2023-07-31 RX ADMIN — PANTOPRAZOLE SODIUM 40 MG: 40 TABLET, DELAYED RELEASE ORAL at 08:07

## 2023-07-31 RX ADMIN — CALCIUM CARBONATE (ANTACID) CHEW TAB 500 MG 500 MG: 500 CHEW TAB at 08:07

## 2023-07-31 RX ADMIN — SENNOSIDES AND DOCUSATE SODIUM 1 TABLET: 50; 8.6 TABLET ORAL at 09:07

## 2023-07-31 RX ADMIN — ASPIRIN 81 MG 81 MG: 81 TABLET ORAL at 09:07

## 2023-07-31 RX ADMIN — ASPIRIN 81 MG 81 MG: 81 TABLET ORAL at 08:07

## 2023-07-31 RX ADMIN — METHOCARBAMOL 500 MG: 500 TABLET ORAL at 09:07

## 2023-07-31 RX ADMIN — METHOCARBAMOL 500 MG: 500 TABLET ORAL at 01:07

## 2023-07-31 RX ADMIN — FINASTERIDE 5 MG: 5 TABLET, FILM COATED ORAL at 08:07

## 2023-07-31 RX ADMIN — ALUMINUM HYDROXIDE, MAGNESIUM HYDROXIDE, AND DIMETHICONE 30 ML: 400; 400; 40 SUSPENSION ORAL at 11:07

## 2023-07-31 RX ADMIN — METHOCARBAMOL 500 MG: 500 TABLET ORAL at 04:07

## 2023-07-31 RX ADMIN — METHOCARBAMOL 500 MG: 500 TABLET ORAL at 08:07

## 2023-07-31 RX ADMIN — ALPRAZOLAM 1 MG: 0.5 TABLET ORAL at 09:07

## 2023-07-31 RX ADMIN — OXYCODONE HYDROCHLORIDE 10 MG: 10 TABLET ORAL at 08:07

## 2023-07-31 NOTE — PT/OT/SLP PROGRESS
"Occupational Therapy   Treatment    Name: Mao Pearce  MRN: [id number]  Admit Date: 7/27/2023  Admitting Diagnosis:  Bilateral primary osteoarthritis of knee    General Precautions: Standard, fall   Orthopedic Precautions: RLE weight bearing as tolerated, LLE weight bearing as tolerated   Braces: N/A    Recommendations:     Discharge Recommendations:  home health OT  Level of Assistance Recommended at Discharge: Intermittent assistance for ADL's and homemaking tasks  Discharge Equipment Recommendations: bedside commode, bath bench, walker, rolling, hip kit  Barriers to discharge:  Decreased caregiver support    Assessment:     Mao Pearce is a 77 y.o. male with a medical diagnosis of Bilateral primary osteoarthritis of knee .  He presents with performance deficits affecting function are weakness, impaired endurance, impaired self care skills, impaired functional mobility, gait instability, impaired balance, decreased lower extremity function, decreased safety awareness, pain, decreased ROM, orthopedic precautions, impaired skin, decreased coordination.   Pt participated fairly during today's session. Pt with increased pain in B LE on today, nsg aware. Pt continues to demonstrate deficits with self care skills, balance, functional mobility, UB strength and endurance. Pt will benefit from continued OT services to progress towards goals.     Rehab Potential is good    Activity tolerance:  Good    Plan:     Patient to be seen 5 x/week to address the above listed problems via self-care/home management, community/work re-entry, therapeutic activities, therapeutic exercises, neuromuscular re-education    Plan of Care Expires: 08/26/23  Plan of Care Reviewed with: patient    Subjective   "I don't know how much I can do"  Communicated with: Lesvia prior to session. .    Pain/Comfort:  Pain Rating 1: 10/10  Location - Side 1: Bilateral  Location - Orientation 1: generalized  Location 1: knee  Pain Addressed 1: " Pre-medicate for activity, Reposition, Distraction  Pain Rating Post-Intervention 1: 9/10    Patient's cultural, spiritual, Temple conflicts given the current situation:  no    Objective:     Patient found up in chair upon OT entry to room.    Bed Mobility:    Patient completed Rolling/Turning to Right with contact guard assistance and with side rail  Patient completed Scooting/Bridging with maximal assistance and 2 persons secondary to increased pain.   Patient completed Sit to Supine with minimum assistance to manage B LE.     Functional Mobility/Transfers:  Patient completed Sit <> Stand Transfer with maximal assistance  with  use of bed rail for support.    Patient completed Bed <> Chair Transfer using Stand Pivot technique with maximal assistance with hand-held assist and use of bed rail for support.     Activities of Daily Living:  Toileting: supervision to use urinal at bed level with HOB elevated.     [facility] 6 Click ADL: 21    OT Exercises: AROM x 2 sets of 15 with #4 handheld weights. Pt perform shoulder flex/ext, forward flex motion and bicep curls.      Therex performed to improve overall endurance, ROM and UB strength required for functional transfers, ADL's and w/c propulsion.     Treatment & Education:  Pt educated on:  - role of OT  - level of assistance  - energy conservation and task modification to maximize independence with ADL's and mobility   -  safety while performing functional transfers and self care tasks  - orthopedic precautions.   - progress towards OT goals     Patient left HOB elevated with call button in reach    GOALS:   Multidisciplinary Problems       Occupational Therapy Goals          Problem: Occupational Therapy    Goal Priority Disciplines Outcome Interventions   Occupational Therapy Goal     OT, PT/OT Ongoing, Progressing    Description: Goals to be met by: 8/26/2023     Patient will increase functional independence with ADLs by performing:    LE Dressing with Modified  [place] and AE as needed.   Toileting from toilet with Modified [place] for hygiene and clothing management and AE as needed.   Bathing from  shower chair/bench with Modified [place] and AE as needed.   Supine to sit with Modified [place] and AE as needed.  Step transfer with Modified [place] and LRAD as needed/  Toilet transfer to toilet with Modified [place] and LRAD as needed.   Tolerate standing for ~10 minutes /c LRAD  Perform functional tasks in standing for ~10 min /c LRAD                         Time Tracking:     OT Date of Treatment: 07/31/23  OT Start Time: 1135    OT Stop Time: 1202  OT Total Time (min): 27 min    Billable Minutes:Therapeutic Activity 15  Therapeutic Exercise 12    7/31/2023  A client care conference was performed between the LOTR and MONTGOMERY, prior to treatment by MONTGOMERY, to discuss the patient's status, treatment plan and established goals.

## 2023-07-31 NOTE — CLINICAL REVIEW
Clinical Pharmacy Chart Review Note      Admit Date: 7/27/2023   LOS: 4 days       Mao Pearce is a 77 y.o. male admitted to SNF for PT/OT after hospitalization for bilateral primary osteoarthritis of knee.    Active Hospital Problems    Diagnosis  POA    *Bilateral primary osteoarthritis of knee [M17.0]  Yes    Status post total bilateral knee replacement [Z96.653]  Not Applicable    BPH (benign prostatic hyperplasia) [N40.0]  Yes    Debility [R53.81]  Yes    Arteriosclerosis of coronary artery [I25.10]  Yes    Hyperlipidemia [E78.5]  Yes    Anxiety [F41.9]  Yes      Resolved Hospital Problems   No resolved problems to display.     Review of patient's allergies indicates:   Allergen Reactions    Ciprofloxacin Rash    Metronidazole Rash     Patient Active Problem List    Diagnosis Date Noted    Status post total bilateral knee replacement 07/29/2023    Arteriosclerosis of coronary artery 07/28/2023    BPH (benign prostatic hyperplasia) 07/28/2023    Debility 07/28/2023    Bilateral primary osteoarthritis of knee 07/25/2023    Anxiety 03/08/2023    Hyperlipidemia 03/08/2023       Scheduled Meds:    acetaminophen  1,000 mg Oral Q8H    aspirin  81 mg Oral BID    atorvastatin  40 mg Oral QHS    docusate sodium  1 enema Rectal Daily    finasteride  5 mg Oral Daily    methocarbamoL  500 mg Oral QID    pantoprazole  40 mg Oral Daily    polyethylene glycol  17 g Oral Daily    senna-docusate 8.6-50 mg  1 tablet Oral BID     Continuous Infusions:   PRN Meds: acetaminophen, acetaminophen, ALPRAZolam, aluminum & magnesium hydroxide-simethicone, calcium carbonate, lactulose, melatonin, oxyCODONE    OBJECTIVE:     Vital Signs (Last 24H)  Temp:  [97.7 °F (36.5 °C)-98.6 °F (37 °C)]   Pulse:  [74-79]   Resp:  [16-18]   BP: (149-159)/(76-84)   SpO2:  [97 %-99 %]     Laboratory:  CBC:   Recent Labs   Lab 07/26/23  0438 07/27/23  0425 07/31/23  0500   WBC 9.21 7.10 5.20   RBC 4.14* 3.36* 3.15*   HGB 13.2* 10.7* 9.9*   HCT 37.3*  30.6* 28.8*   PLT 88* 68* 117*   MCV 90 91 91   MCH 31.9* 31.8* 31.4*   MCHC 35.4 35.0 34.4     BMP:   Recent Labs   Lab 07/26/23  0438 07/27/23  0425   * 139*   * 135*   K 3.7 3.9    103   CO2 23 26   BUN 10 9   CREATININE 0.7 0.8   CALCIUM 8.7 8.7     CMP:   Recent Labs   Lab 07/26/23  0438 07/27/23  0425   * 139*   CALCIUM 8.7 8.7   * 135*   K 3.7 3.9   CO2 23 26    103   BUN 10 9   CREATININE 0.7 0.8     Lab Results   Component Value Date    ALT 28 04/24/2012    AST 30 04/24/2012    ALKPHOS 69 04/24/2012    BILITOT 0.6 04/24/2012           ASSESSMENT/PLAN:     I have reviewed the medications in compliance with CMS Regulation F756 of the [place]. Based on information gathered, the following items may need to be addressed:    **Alprazolam is ordered as needed for anxiety. Per CMS guidelines: The timeframe is limited for PRN psychotropic medications, which are not antipsychotic medications, to 14 days, unless a longer timeframe is deemed appropriate by the attending physician or the prescribing practitioner, however this is a continuation of a home medication order. Monitor use and associated side effects. Consider discontinuation if patient is not using or adverse effects observed.      Medications reviewed by PharmD, please re-consult if needed.      Louann Yen, Pharm. D.  Clinical Pharmacist  [facility]

## 2023-07-31 NOTE — PROGRESS NOTES
[facility]                                  Skilled Nursing Facility                   Progress Note     Admit Date: 7/27/2023  FARHAT TBD  Principal Problem:  Bilateral primary osteoarthritis of knee   HPI obtained from patient interview and chart review     Chief Complaint: Re-evaluation of medical treatment and therapy status: Lab review, constipation    HPI:   Mr. Pearce is a 74-year-old male with PMHx of HLD, BPH, osteoarthritis who presents to SNF following hospitalization for bilateral primary osteoarthritis s/p bilateral total knee arthroplasty on 07/25 with Dr. Cox.  For secondary weakness and debility.    Interval history: All of today's labs reviewed and are listed below.  24 hr vital sign ranges listed below.  Patient still without a bowel movement, last BM was on 07/24 prior to surgery.  Patient has done 2 enemas over the weekend.  Passing flatus, has active bowel sounds throughout, denies abdominal discomfort.  Patient denies shortness of breath, nausea, or vomiting.  Patient reports an adequate appetite.  Patient denies dysuria.  Patient reports having regular bowel movements.  Patient progessing with PT/OT- Gait: amb with RW min/CGA ~ 100 ft and 50 ft seated rest break no LOB, inc BUE support, vcs for posture, slow mica, slight step through gait pattern, RW fairly continous and vcs for heels strike to facilitate knee extension with contact. Continuing to follow and treat all acute and chronic conditions.    Past Medical History: Patient has a past medical history of BPH (benign prostatic hyperplasia), Hair loss, Hyperlipidemia, and Osteoarthritis.    Past Surgical History: Patient has a past surgical history that includes Knee arthroscopy; Tonsillectomy; Hip replacement arthroplasty (Left, 5/7/2020); and Total knee arthroplasty (Bilateral, 7/25/2023).    Social History: Patient reports that he quit smoking about 37 years  ago. His smoking use included cigarettes. He has never used smokeless tobacco. He reports current alcohol use. He reports that he does not currently use drugs.    Family History: family history includes No Known Problems in his father and mother. He was adopted.    Allergies: Patient is allergic to ciprofloxacin and metronidazole.    ROS  Constitutional: Negative for fever   Eyes: Negative for blurred vision, double vision   Respiratory: Negative for cough, shortness of breath   Cardiovascular: Negative for chest pain, palpitations, and leg swelling.   Gastrointestinal: Negative for abdominal pain, diarrhea, nausea, vomiting.  +constipation  Genitourinary: Negative for dysuria, frequency   Musculoskeletal:  + generalized weakness. Negative for back pain and myalgias.   Skin: Negative for itching and rash.   Neurological: Negative for dizziness, headaches.   Psychiatric/Behavioral: Negative for depression. The patient is not nervous/anxious.      24 hour Vital Sign Range   Temp:  [97.7 °F (36.5 °C)]   Pulse:  [79]   Resp:  [16-18]   BP: (159)/(76)   SpO2:  [99 %]     Current BMI: Body mass index is 30.95 kg/m².    PEx  Constitutional: Patient appears debilitated.  No distress noted  HENT:   Head: Normocephalic and atraumatic.   Eyes: Pupils are equal, round  Neck: Normal range of motion. Neck supple.   Cardiovascular: Normal rate, regular rhythm and normal heart sounds.    Pulmonary/Chest: Effort normal and breath sounds are clear  Abdominal: Soft. Bowel sounds are normal.   Musculoskeletal: Normal range of motion.   Neurological: Alert and oriented to person, place, and time.   Psychiatric: Normal mood and affect. Behavior is normal.   Skin: Skin is warm and dry. .  Surgical dressing to bilateral knees    Recent Labs   Lab 07/31/23  0500      K 3.5   CL 97   CO2 28   BUN 12   CREATININE 0.7   CALCIUM 8.9   MG 2.0       Recent Labs   Lab 07/31/23  0500   WBC 5.20   RBC 3.15*   HGB 9.9*   HCT 28.8*   *  "  MCV 91   MCH 31.4*   MCHC 34.4       No results for input(s): "POCTGLUCOSE" in the last 168 hours.     Assessment and Plan:    Constipation  - received multiple enemas, initiated lactulose PRN today, suppository x1, continue senna docusate and daily MiraLax.     Bilateral knee osteoarthritis  S/p bilateral TKA on 07/26  - PT/OT, WBAT  - DVT PPX with ASA 81 mg BID  - dressing to be changed on postop day 3 and every 3 days afterwards or PRN for saturation  - f/u with [facility] [place] [facility] [place]    Acute postoperative pain  - improved, 1000 mg q.8 hours, methocarbamol 500 mg QID, oxycodone 10 mg q.4 hours PRN    Generalized anxiety  - stable, continue alprazolam 1 mg qHS. PRN    BPH  - stable, continue finasteride 5 mg daily     HLD  - continue atorvastatin 40 mg qHS    GERD  - stable, continue Protonix 40 mg daily, PRN tums and mylamta     Debility   - Continue with PT/OT for gait training and strengthening and restoration of ADL's   - Encourage mobility, OOB in chair, and early ambulation as appropriate  - Fall precautions   - Monitor for bowel and bladder dysfunction  - Monitor for and prevent skin breakdown and pressure ulcers  - Continue DVT prophylaxis with ASA 81 mg BID    Anticipate disposition:  Home with home health      Follow-up needed during SNF stay-[place] [facility] [place] [facility] [place]    Follow-up needed after discharge from SNF: PCP, [facility]    No future appointments.        Jenifer Jesus NP  Department of Hospital Medicine   [facility]- Skilled Nursing Facility     DOS: 7/31/2023       Patient note was created using MModal Dictation.  Any errors in syntax or even information may not have been identified and edited on initial review prior to signing this note.   "

## 2023-07-31 NOTE — PLAN OF CARE
Problem: Occupational Therapy  Goal: Occupational Therapy Goal  Description: Goals to be met by: 8/26/2023     Patient will increase functional independence with ADLs by performing:    LE Dressing with Modified [place] and AE as needed.   Toileting from toilet with Modified [place] for hygiene and clothing management and AE as needed.   Bathing from  shower chair/bench with Modified [place] and AE as needed.   Supine to sit with Modified [place] and AE as needed.  Step transfer with Modified [place] and LRAD as needed/  Toilet transfer to toilet with Modified [place] and LRAD as needed.   Tolerate standing for ~10 minutes /c LRAD  Perform functional tasks in standing for ~10 min /c LRAD    Outcome: Ongoing, Progressing

## 2023-07-31 NOTE — PT/OT/SLP PROGRESS
Physical Therapy Treatment    Patient Name:  Mao Pearce   MRN:  [id number]  Admit Date: 7/27/2023  Admitting Diagnosis: Bilateral primary osteoarthritis of knee  Recent Surgeries: B TKA    General Precautions: Standard, fall  Orthopedic Precautions: RLE weight bearing as tolerated, LLE weight bearing as tolerated  Braces: N/A    Recommendations:     Discharge Recommendations: home health PT  Level of Assistance Recommended at Discharge: Intermittent assistance   Discharge Equipment Recommendations: bedside commode, bath bench, walker, rolling, hip kit  Barriers to discharge: Decreased caregiver support (Increased assistance for mobility)    Assessment:     Mao Pearce is a 77 y.o. male admitted with a medical diagnosis of Bilateral primary osteoarthritis of knee. Patient agreeable to PT treatment this AM. Patient demonstrates increased independence with functional transfers this session in presence of significant B knee pain; pain medication administered prior to session per nursing. Patient able to complete sit to stand transfer from bed, W/C and level mat with [place] using RW. Patient requiring brief rest breaks throughout BLE ROM exercises secondary to fatigue and pain. Patient motivated to engage in therapy for progression toward functional independence. Patient will benefit from continued SNF rehabilitation services to address deficits as well as progress mobility towards PLOF for safe transition to home environment at time of discharge.        Performance deficits affecting function: weakness, impaired endurance, impaired self care skills, impaired functional mobility, gait instability, impaired balance, decreased lower extremity function, decreased safety awareness, pain, decreased ROM, edema, orthopedic precautions, impaired cardiopulmonary response to activity.    Rehab Potential is good    Activity Tolerance: Good    Plan:     Patient to be seen 5 x/week to address the above listed problems via  "gait training, therapeutic activities, therapeutic exercises, neuromuscular re-education, wheelchair management/training    Plan of Care Expires: 08/27/23  Plan of Care Reviewed with: patient    Subjective     "I want to do whatever I need to do to safely get back home."     Pain/Comfort:  Pain Rating 1: 10/10  Location - Side 1: Bilateral  Location - Orientation 1: generalized  Location 1: knee  Pain Addressed 1: Pre-medicate for activity, Reposition, Distraction, Cessation of Activity  Pain Rating Post-Intervention 1: 10/10    Patient's cultural, spiritual, Mandaeism conflicts given the current situation:  no    Objective:     Communicated with nursing staff prior to session.  Patient found supine with cryotherapy upon PT entry to room.     Therapeutic Activities and Exercises:   Increased time spent with BLE ROM exercises. Rest breaks throughout secondary to pain and fatigue.     Supine BLE AROM exercises x 10 reps including quad sets, heel slides (assist from PT), hip abduction (assist from PT) and SAQs with bolster    Seated BLE ROM exercises to tolerance EOB prior to OOB transfer. Patient then performed 2 x 5 reps of seated knee flexion EOM in therapy gym with PT providing passive stretch into knee flexion x 2 trials on each LE as tolerated.     Functional Mobility:  Bed Mobility:     Supine to Sit: stand by assistance and on bed (with railings) and level mat (no railings)  Sit to Supine: minimum assistance and on level mat without railings  Transfers:     Sit to Stand:  minimum assistance with rolling walker and repeated x multiple trials from bed, W/C and level mat  Bed to Chair: contact guard assistance with  rolling walker  using  Step Transfer and repeated x 3 trials (bed to W/C, W/C to mat, mat to W/C)  Gait: Patient ambulated 205 feet using RW with CGA. Initial flexion posture with progression to upright, erect posture with increased terminal knee extension. Patient with gradual increase in gait speed " over course of trial. Step through gait pattern completed. No LOB. Increased time to complete. Patient ambulated an additional 50 feet to access level mat using RW with CGA.      AM-PAC 6 CLICK MOBILITY  16    Patient left up in chair with call button in reach and nursing staff and MONTGOMERY notified.    GOALS:   Multidisciplinary Problems       Physical Therapy Goals          Problem: Physical Therapy    Goal Priority Disciplines Outcome Goal Variances Interventions   Physical Therapy Goal     PT, PT/OT Ongoing, Progressing     Description: Goals to be met by: 23     Patient will increase functional independence with mobility by performin. Supine to sit with [place]  2. Sit to supine with [place]  3. Rolling to Left and Right with [place]  4. Sit to stand transfer with Modified [place]  5. Bed to chair transfer with Modified [place] using Rolling Walker  6. Gait  x 150 feet with Modified [place] using Rolling Walker  7. Wheelchair propulsion x 150 feet with Modified [place] using bilateral upper extremities                         Time Tracking:     PT Received On: 23  PT Start Time: 0950  PT Stop Time: 1054  PT Total Time (min): 64 min    Billable Minutes: Gait Training 16, Therapeutic Activity 18, and Therapeutic Exercise 30    Treatment Type: Treatment  PT/PTA: PT     Number of PTA visits since last PT visit: 0     2023

## 2023-08-01 PROCEDURE — 25000003 PHARM REV CODE 250: Performed by: HOSPITALIST

## 2023-08-01 PROCEDURE — 11000004 HC SNF PRIVATE

## 2023-08-01 PROCEDURE — 25000003 PHARM REV CODE 250: Performed by: NURSE PRACTITIONER

## 2023-08-01 PROCEDURE — 97110 THERAPEUTIC EXERCISES: CPT | Mod: CO

## 2023-08-01 PROCEDURE — 97116 GAIT TRAINING THERAPY: CPT | Mod: CQ

## 2023-08-01 PROCEDURE — 97530 THERAPEUTIC ACTIVITIES: CPT | Mod: CQ

## 2023-08-01 PROCEDURE — 97110 THERAPEUTIC EXERCISES: CPT | Mod: CQ

## 2023-08-01 PROCEDURE — 97530 THERAPEUTIC ACTIVITIES: CPT | Mod: CO

## 2023-08-01 PROCEDURE — 94761 N-INVAS EAR/PLS OXIMETRY MLT: CPT

## 2023-08-01 RX ORDER — SYRING-NEEDL,DISP,INSUL,0.3 ML 29 G X1/2"
296 SYRINGE, EMPTY DISPOSABLE MISCELLANEOUS ONCE
Status: COMPLETED | OUTPATIENT
Start: 2023-08-01 | End: 2023-08-01

## 2023-08-01 RX ORDER — SUCRALFATE 1 G/10ML
1 SUSPENSION ORAL
Status: DISCONTINUED | OUTPATIENT
Start: 2023-08-01 | End: 2023-08-10

## 2023-08-01 RX ORDER — SIMETHICONE 80 MG
1 TABLET,CHEWABLE ORAL
Status: DISPENSED | OUTPATIENT
Start: 2023-08-01 | End: 2023-08-04

## 2023-08-01 RX ORDER — PSEUDOEPHEDRINE/ACETAMINOPHEN 30MG-500MG
100 TABLET ORAL ONCE
Status: COMPLETED | OUTPATIENT
Start: 2023-08-01 | End: 2023-08-01

## 2023-08-01 RX ADMIN — ACETAMINOPHEN 1000 MG: 500 TABLET ORAL at 10:08

## 2023-08-01 RX ADMIN — METHOCARBAMOL 500 MG: 500 TABLET ORAL at 05:08

## 2023-08-01 RX ADMIN — METHOCARBAMOL 500 MG: 500 TABLET ORAL at 01:08

## 2023-08-01 RX ADMIN — SIMETHICONE 80 MG: 80 TABLET, CHEWABLE ORAL at 05:08

## 2023-08-01 RX ADMIN — SENNOSIDES AND DOCUSATE SODIUM 1 TABLET: 50; 8.6 TABLET ORAL at 09:08

## 2023-08-01 RX ADMIN — FINASTERIDE 5 MG: 5 TABLET, FILM COATED ORAL at 09:08

## 2023-08-01 RX ADMIN — OXYCODONE HYDROCHLORIDE 15 MG: 10 TABLET ORAL at 09:08

## 2023-08-01 RX ADMIN — ACETAMINOPHEN 1000 MG: 500 TABLET ORAL at 01:08

## 2023-08-01 RX ADMIN — OXYCODONE HYDROCHLORIDE 10 MG: 10 TABLET ORAL at 06:08

## 2023-08-01 RX ADMIN — SUCRALFATE 1 G: 1 SUSPENSION ORAL at 05:08

## 2023-08-01 RX ADMIN — METHOCARBAMOL 500 MG: 500 TABLET ORAL at 09:08

## 2023-08-01 RX ADMIN — ASPIRIN 81 MG 81 MG: 81 TABLET ORAL at 09:08

## 2023-08-01 RX ADMIN — Medication 100 ML: at 06:08

## 2023-08-01 RX ADMIN — OXYCODONE HYDROCHLORIDE 15 MG: 10 TABLET ORAL at 05:08

## 2023-08-01 RX ADMIN — BE HEALTH MAGNESIUM CITRATE ORAL SOLUTION - LEMON 296 ML: 1.75 LIQUID ORAL at 06:08

## 2023-08-01 RX ADMIN — PANTOPRAZOLE SODIUM 40 MG: 40 TABLET, DELAYED RELEASE ORAL at 09:08

## 2023-08-01 RX ADMIN — ALPRAZOLAM 1 MG: 0.5 TABLET ORAL at 09:08

## 2023-08-01 RX ADMIN — ACETAMINOPHEN 1000 MG: 500 TABLET ORAL at 06:08

## 2023-08-01 RX ADMIN — SODIUM CHLORIDE 500 ML: 0.9 INJECTION, SOLUTION INTRAVENOUS at 12:08

## 2023-08-01 RX ADMIN — POLYETHYLENE GLYCOL 3350 17 G: 17 POWDER, FOR SOLUTION ORAL at 09:08

## 2023-08-01 RX ADMIN — OXYCODONE HYDROCHLORIDE 10 MG: 10 TABLET ORAL at 02:08

## 2023-08-01 RX ADMIN — OXYCODONE HYDROCHLORIDE 10 MG: 10 TABLET ORAL at 11:08

## 2023-08-01 NOTE — PLAN OF CARE
Problem: Occupational Therapy  Goal: Occupational Therapy Goal  Description: Goals to be met by: 8/26/2023     Patient will increase functional independence with ADLs by performing:    LE Dressing with Modified [place] and AE as needed.   Toileting from toilet with Modified [place] for hygiene and clothing management and AE as needed.   Bathing from  shower chair/bench with Modified [place] and AE as needed.   Supine to sit with Modified [place] and AE as needed.  Step transfer with Modified [place] and LRAD as needed/  Toilet transfer to toilet with Modified [place] and LRAD as needed.   Tolerate standing for ~10 minutes /c LRAD  Perform functional tasks in standing for ~10 min /c LRAD    Outcome: Ongoing, Progressing

## 2023-08-01 NOTE — PT/OT/SLP PROGRESS
Physical Therapy Treatment    Patient Name:  Mao Pearce   MRN:  [id number]  Admit Date: 7/27/2023  Admitting Diagnosis: Bilateral primary osteoarthritis of knee  Recent Surgeries: B TKA    General Precautions: Standard, fall  Orthopedic Precautions: RLE weight bearing as tolerated, LLE weight bearing as tolerated  Braces: N/A    Recommendations:     Discharge Recommendations: home health PT  Level of Assistance Recommended at Discharge: Intermittent assistance   Discharge Equipment Recommendations: bedside commode, bath bench, walker, rolling, hip kit  Barriers to discharge: Decreased caregiver support (Increased assistance for mobility)    Assessment:     Moa Pearce is a 77 y.o. male admitted with a medical diagnosis of Bilateral primary osteoarthritis of knee . Pt tolerated well, pt amb with inc distance, close SBA with RW. Pt tolerated Active/Passive ROM when seated in w/c. Pt able to propel w/c with BUE from room to gym and back without issue. Pt motivated/ pleasant and would continue to benefit from skilled PT services to improve overall functional mobility, strength and endurance.      Performance deficits affecting function: weakness, impaired endurance, impaired self care skills, impaired functional mobility, gait instability, impaired balance, decreased lower extremity function, decreased safety awareness, pain, decreased ROM, edema, orthopedic precautions, impaired cardiopulmonary response to activity.    Rehab Potential is good    Activity Tolerance: Good    Plan:     Patient to be seen 5 x/week to address the above listed problems via gait training, therapeutic activities, therapeutic exercises, neuromuscular re-education, wheelchair management/training    Plan of Care Expires: 08/27/23  Plan of Care Reviewed with: patient    Subjective     Pt agreeable for PT.     Pain/Comfort:  Pain Rating 1:  (did not rate)  Location - Side 1: Bilateral  Location - Orientation 1: generalized  Location 1:  knee  Pain Addressed 1: Pre-medicate for activity, Reposition, Distraction, Cessation of Activity  Pain Rating Post-Intervention 1:  (did not rate)    Patient's cultural, spiritual, Anglican conflicts given the current situation:  no    Objective:     Patient found HOB elevated with  (no lines) upon PT entry to room.     Therapeutic Activities and Exercises: seated TE (AROM): LAQ, AP X 20 reps for BLE strengthening                Seated TE (PROM) Knee ext/ knee flex:  X 2 for ~30 sec holds on BLE for inc mobility    Supine TE: hip abd/add  x10 reps, AP x 10 reps , HS x 2 reps for BLE strengthening/mobility     Patient educated on role of therapy, goals of session, and benefits of out of bed mobility.   Instructed on use of call button and importance of calling nursing staff for assistance with mobility   Questions/concerns addressed within PTA scope of practice  Pt verbalized understanding.    Functional Mobility:  Bed Mobility:     Supine to Sit: supervision, HOB elevated without rail  Sit to Supine: supervision, HOB elevated without rail  Transfers:     Sit to Stand:  contact guard assistance with rolling walker, vc for efficient technique   Bed to Chair: contact guard assistance with  no AD  using  Stand Pivot  Gait: pt amb >200 ft Close SBA with RW, vc for gaze direction and upright posture, no LOB, reciprocal gait pattern. Pt amb an additional 10 ft to bed from w/c in room  Wheelchair Propulsion:  Pt propelled Standard wheelchair 2 x 180 feet on Level tile with  Bilateral upper extremity with Modified Independent.     AM-PAC 6 CLICK MOBILITY  16    Patient left HOB elevated with call button in reach.    GOALS:   Multidisciplinary Problems       Physical Therapy Goals          Problem: Physical Therapy    Goal Priority Disciplines Outcome Goal Variances Interventions   Physical Therapy Goal     PT, PT/OT Ongoing, Progressing     Description: Goals to be met by: 8/27/23     Patient will increase functional  independence with mobility by performin. Supine to sit with [place]  2. Sit to supine with [place]  3. Rolling to Left and Right with [place]  4. Sit to stand transfer with Modified [place]  5. Bed to chair transfer with Modified [place] using Rolling Walker  6. Gait  x 150 feet with Modified [place] using Rolling Walker  7. Wheelchair propulsion x 150 feet with Modified [place] using bilateral upper extremities                         Time Tracking:     PT Received On: 23  PT Start Time: 1511  PT Stop Time: 1600  PT Total Time (min): 49 min    Billable Minutes: Gait Training 15, Therapeutic Activity 19, and Therapeutic Exercise 15    Treatment Type: Treatment  PT/PTA: PTA     Number of PTA visits since last PT visit: 2023

## 2023-08-01 NOTE — PT/OT/SLP PROGRESS
Occupational Therapy   Treatment    Name: Mao Pearce  MRN: [id number]  Admit Date: 7/27/2023  Admitting Diagnosis:  Bilateral primary osteoarthritis of knee    General Precautions: Standard, fall   Orthopedic Precautions: RLE weight bearing as tolerated, LLE weight bearing as tolerated   Braces: N/A    Recommendations:     Discharge Recommendations:  home health OT  Level of Assistance Recommended at Discharge: Intermittent assistance for ADL's and homemaking tasks  Discharge Equipment Recommendations: bedside commode, bath bench, walker, rolling, hip kit  Barriers to discharge:  Decreased caregiver support    Assessment:     Mao Pearce is a 77 y.o. male with a medical diagnosis of Bilateral primary osteoarthritis of knee.  He presents with limitations in performance of self-care, functional mobility, and ADLs. Performance deficits affecting function are weakness, impaired endurance, impaired self care skills, impaired functional mobility, gait instability, impaired balance, decreased lower extremity function, decreased safety awareness, pain, decreased ROM, orthopedic precautions, impaired skin, decreased coordination. Pt tolerated Tx without incident, however reported increased pain at the end of Tx. Pt requested to transfer back to bed and notified nurse. Pt is making progress but continues to require assist to perform self care tasks, functional mobility and functional transfers. Pt would continue to benefit from OT intervention to further functional (I)ce and safety.     Rehab Potential is good    Activity tolerance:  Good    Plan:     Patient to be seen 5 x/week to address the above listed problems via self-care/home management, community/work re-entry, therapeutic activities, therapeutic exercises, neuromuscular re-education    Plan of Care Expires: 08/26/23  Plan of Care Reviewed with: patient    Subjective     Communicated with: Nursing prior to session.     Pain/Comfort:  Pain Rating 1:  5/10  Location - Side 1: Bilateral  Location - Orientation 1: generalized  Location 1: knee  Pain Addressed 1: Pre-medicate for activity, Reposition, Distraction, Cessation of Activity  Pain Rating Post-Intervention 1: 8/10    Patient's cultural, spiritual, Scientologist conflicts given the current situation:  no    Objective:     Patient found HOB elevated with  (no lines) upon OT entry to room.    Bed Mobility:    Patient completed Scooting/Bridging with supervision and with side rail  Patient completed Sit to Supine with supervision and with side rail     Functional Mobility/Transfers:  Patient completed Sit <> Stand Transfer with contact guard assistance 1x and minimum assistance 2x with  rolling walker   Patient completed Bed <> Chair Transfer using Stand Pivot technique with contact guard assistance with rolling walker  Patient completed Chair <> Bed Stand Pivot technique with minimum assistance with rolling walker  Functional Mobility: Pt propelled W/C from room to therapy gym with sup for a total of 175 ft using BUE to propel chair.    [facility] 6 Click ADL: 21    OT Exercises: Pt performed UBE exercise for 10 minutes with Mod resistance. Pt performed bilateral UE exercise with 4 lb dumbbells through functional ROM with rep of 15 x2 for each motion. UE exercises performed to increase functional endurance and strength in order increase independence when performing self care tasks, functional ambulation, W/C propulsion, and functional standing activities .      Treatment & Education:  Pt participated in standing activity with CGA and RW. Pt at raised counter to perform fine motor and visual scanning activity with focus on standing tolerance, functional reaching, dynamic standing bal, crossing midline, and to promote independence with homemaking and self care tasks. Pt tolerated standing for 5 min and 56 sec     Pt educated on:  - role of OT  - level of assistance  - energy conservation and task modification to  maximized independence with ADL's and mobility   -  safety while performing functional transfers and self care tasks  - progress towards OT goals      Patient left HOB elevated with call button in reach    GOALS:   Multidisciplinary Problems       Occupational Therapy Goals          Problem: Occupational Therapy    Goal Priority Disciplines Outcome Interventions   Occupational Therapy Goal     OT, PT/OT Ongoing, Progressing    Description: Goals to be met by: 8/26/2023     Patient will increase functional independence with ADLs by performing:    LE Dressing with Modified [place] and AE as needed.   Toileting from toilet with Modified [place] for hygiene and clothing management and AE as needed.   Bathing from  shower chair/bench with Modified [place] and AE as needed.   Supine to sit with Modified [place] and AE as needed.  Step transfer with Modified [place] and LRAD as needed/  Toilet transfer to toilet with Modified [place] and LRAD as needed.   Tolerate standing for ~10 minutes /c LRAD  Perform functional tasks in standing for ~10 min /c LRAD                         Time Tracking:     OT Date of Treatment: 08/01/23  OT Start Time: 0950    OT Stop Time: 1044  OT Total Time (min): 54 min    Billable Minutes:Therapeutic Activity 30  Therapeutic Exercise 24    8/1/2023  A client care conference was performed between the LOTR and ULISES, prior to treatment by MONTGOMERY, to discuss the patient's status, treatment plan and established goals.

## 2023-08-01 NOTE — PROGRESS NOTES
08/28/2018  Cristian Harris [facility]                                  Skilled Nursing Facility                   Progress Note     Admit Date: 7/27/2023  FARHAT TBD  Principal Problem:  Bilateral primary osteoarthritis of knee   HPI obtained from patient interview and chart review     Chief Complaint: Re-evaluation of medical treatment and therapy status: constipation, GERD    HPI:   Mr. Pearce is a 74-year-old male with PMHx of HLD, BPH, osteoarthritis who presents to SNF following hospitalization for bilateral primary osteoarthritis s/p bilateral total knee arthroplasty on 07/25 with Dr. Cox.  For secondary weakness and debility.    Interval history:    24 hr vital sign ranges listed below. Patient still without a bowel movement, last BM was on 07/24 prior to surgery. He has trialed 2 docusate enemas on 07/28 in 7/29, receive soapsuds enema on 07/30, suppository in 7/31 along with PO lactulose, will try brown bowel minima today.  Patient also having severe GERD, he states that he is belching so much that he is having trouble breathing in between.  Pain not well controlled this time.  Patient denies shortness of breath, abdominal discomfort, nausea, or vomiting.  Patient reports an adequate appetite.  Patient denies dysuria.   Patient progressing with PT/OT- Gait: Patient ambulated 205 feet using RW with CGA. Initial flexion posture with progression to upright, erect posture with increased terminal knee extension. Patient with gradual increase in gait speed over course of trial. Step through gait pattern completed. No LOB. Increased time to complete. Patient ambulated an additional 50 feet to access level mat using RW with CGA. Continuing to follow and treat all acute and chronic conditions.    Past Medical History: Patient has a past medical history of BPH (benign prostatic hyperplasia), Hair loss, Hyperlipidemia, and Osteoarthritis.    Past Surgical History: Patient  has a past surgical history that includes Knee arthroscopy; Tonsillectomy; Hip replacement arthroplasty (Left, 5/7/2020); and Total knee arthroplasty (Bilateral, 7/25/2023).    Social History: Patient reports that he quit smoking about 37 years ago. His smoking use included cigarettes. He has never used smokeless tobacco. He reports current alcohol use. He reports that he does not currently use drugs.    Family History: family history includes No Known Problems in his father and mother. He was adopted.    Allergies: Patient is allergic to ciprofloxacin and metronidazole.    ROS  Constitutional: Negative for fever   Eyes: Negative for blurred vision, double vision   Respiratory: Negative for cough, shortness of breath   Cardiovascular: Negative for chest pain, palpitations, and leg swelling.   Gastrointestinal: Negative for abdominal pain, diarrhea, nausea, vomiting.  +constipation, indigestion  Genitourinary: Negative for dysuria, frequency   Musculoskeletal:  + generalized weakness. Negative for back pain and myalgias.   Skin: Negative for itching and rash.   Neurological: Negative for dizziness, headaches.   Psychiatric/Behavioral: Negative for depression. The patient is not nervous/anxious.      24 hour Vital Sign Range   Temp:  [96.2 °F (35.7 °C)-98.5 °F (36.9 °C)]   Pulse:  [82-86]   Resp:  [16-18]   BP: (133-140)/(76-83)   SpO2:  [96 %-98 %]     Current BMI: Body mass index is 30.95 kg/m².    PEx  Constitutional: Patient appears debilitated.  No distress noted  HENT:   Head: Normocephalic and atraumatic.   Eyes: Pupils are equal, round  Neck: Normal range of motion. Neck supple.   Cardiovascular: Normal rate, regular rhythm and normal heart sounds.    Pulmonary/Chest: Effort normal and breath sounds are clear  Abdominal: Soft. Bowel sounds are normal.   Musculoskeletal: Normal range of motion.   Neurological: Alert and oriented to person, place, and time.   Psychiatric: Normal mood and affect. Behavior is  "normal.   Skin: Skin is warm and dry. Surgical dressing to bilateral knees    No results for input(s): "GLUCOSE", "NA", "K", "CL", "CO2", "BUN", "CREATININE", "CALCIUM", "MG" in the last 24 hours.      No results for input(s): "WBC", "RBC", "HGB", "HCT", "PLT", "MCV", "MCH", "MCHC" in the last 24 hours.      No results for input(s): "POCTGLUCOSE" in the last 168 hours.     Assessment and Plan:    Constipation  - 8/1 ordered brown bottom enema today.  received multiple enemas, lactulose q.6 hours PRN , suppository on 7/31, continue senna docusate and daily MiraLax.     GERD  - unstable, initiate sucralfate 100 mg AC/HS along with simethicone 90 mg TID PC x3 days, DC Protonix 40 mg daily, continue PRN tums and mylamta    Bilateral knee osteoarthritis  S/p bilateral TKA on 07/26  - PT/OT, WBAT  - DVT PPX with ASA 81 mg BID  - dressing to be changed on postop day 3 and every 3 days afterwards or PRN for saturation  - f/u with [facility] on 8/16 1030AM , [place]    Acute postoperative pain  - unstable, increase oxycodone to 15 mg q.4 hours PRN, 1000 mg q.8 hours, methocarbamol 500 mg QID    Generalized anxiety  - stable, continue alprazolam 1 mg qHS. PRN    BPH  - stable, continue finasteride 5 mg daily     HLD  - continue atorvastatin 40 mg qHS    Debility   - Continue with PT/OT for gait training and strengthening and restoration of ADL's   - Encourage mobility, OOB in chair, and early ambulation as appropriate  - Fall precautions   - Monitor for bowel and bladder dysfunction  - Monitor for and prevent skin breakdown and pressure ulcers  - Continue DVT prophylaxis with ASA 81 mg BID    Anticipate disposition:  Home with home health      Follow-up needed during SNF stay-f/u with [facility] on 8/16 1030AM , [place]- will not show up in EPIC    Follow-up needed after discharge from SNF: PCP, [facility]    No future appointments.        Jenifer Jesus NP  Department of " Hospital Medicine [facility]     DOS: 8/1/2023       Patient note was created using MModal Dictation.  Any errors in syntax or even information may not have been identified and edited on initial review prior to signing this note.

## 2023-08-02 PROCEDURE — 97116 GAIT TRAINING THERAPY: CPT | Mod: CQ

## 2023-08-02 PROCEDURE — 25000003 PHARM REV CODE 250: Performed by: NURSE PRACTITIONER

## 2023-08-02 PROCEDURE — 11000004 HC SNF PRIVATE

## 2023-08-02 PROCEDURE — 25000003 PHARM REV CODE 250: Performed by: HOSPITALIST

## 2023-08-02 PROCEDURE — 97110 THERAPEUTIC EXERCISES: CPT | Mod: CQ

## 2023-08-02 PROCEDURE — 97535 SELF CARE MNGMENT TRAINING: CPT | Mod: CO

## 2023-08-02 PROCEDURE — 97110 THERAPEUTIC EXERCISES: CPT | Mod: CO

## 2023-08-02 PROCEDURE — 97530 THERAPEUTIC ACTIVITIES: CPT | Mod: CQ

## 2023-08-02 PROCEDURE — 97530 THERAPEUTIC ACTIVITIES: CPT | Mod: CO

## 2023-08-02 RX ORDER — AMOXICILLIN 250 MG
2 CAPSULE ORAL 2 TIMES DAILY
Status: DISCONTINUED | OUTPATIENT
Start: 2023-08-02 | End: 2023-08-08

## 2023-08-02 RX ADMIN — SUCRALFATE 1 G: 1 SUSPENSION ORAL at 10:08

## 2023-08-02 RX ADMIN — FINASTERIDE 5 MG: 5 TABLET, FILM COATED ORAL at 09:08

## 2023-08-02 RX ADMIN — ACETAMINOPHEN 1000 MG: 500 TABLET ORAL at 05:08

## 2023-08-02 RX ADMIN — OXYCODONE HYDROCHLORIDE 15 MG: 10 TABLET ORAL at 09:08

## 2023-08-02 RX ADMIN — SIMETHICONE 80 MG: 80 TABLET, CHEWABLE ORAL at 01:08

## 2023-08-02 RX ADMIN — METHOCARBAMOL 500 MG: 500 TABLET ORAL at 01:08

## 2023-08-02 RX ADMIN — ACETAMINOPHEN 1000 MG: 500 TABLET ORAL at 01:08

## 2023-08-02 RX ADMIN — SENNOSIDES AND DOCUSATE SODIUM 2 TABLET: 50; 8.6 TABLET ORAL at 09:08

## 2023-08-02 RX ADMIN — METHOCARBAMOL 500 MG: 500 TABLET ORAL at 05:08

## 2023-08-02 RX ADMIN — OXYCODONE HYDROCHLORIDE 15 MG: 10 TABLET ORAL at 01:08

## 2023-08-02 RX ADMIN — SUCRALFATE 1 G: 1 SUSPENSION ORAL at 09:08

## 2023-08-02 RX ADMIN — ASPIRIN 81 MG 81 MG: 81 TABLET ORAL at 09:08

## 2023-08-02 RX ADMIN — SIMETHICONE 80 MG: 80 TABLET, CHEWABLE ORAL at 09:08

## 2023-08-02 RX ADMIN — OXYCODONE HYDROCHLORIDE 15 MG: 10 TABLET ORAL at 07:08

## 2023-08-02 RX ADMIN — OXYCODONE HYDROCHLORIDE 15 MG: 10 TABLET ORAL at 03:08

## 2023-08-02 RX ADMIN — POLYETHYLENE GLYCOL 3350 17 G: 17 POWDER, FOR SOLUTION ORAL at 09:08

## 2023-08-02 RX ADMIN — SUCRALFATE 1 G: 1 SUSPENSION ORAL at 05:08

## 2023-08-02 RX ADMIN — METHOCARBAMOL 500 MG: 500 TABLET ORAL at 09:08

## 2023-08-02 NOTE — TREATMENT PLAN
"Rehab Services' DME recommendations    Mao Pearce  MRN: [id number]      [x] Walker Adult (5'1"-6"6")      Wheels yes       [x] 3 in 1 commode Standard      [x] Tub bench Standard (unpadded)         [x] Hip Kit Standard/Short      [x] Home health PT, OT, and Aide      Mary Jolly, PTA 8/2/2023     "

## 2023-08-02 NOTE — PROGRESS NOTES
[facility]                                  Skilled Nursing Facility                   Progress Note     Admit Date: 7/27/2023  FARHAT TBD  Principal Problem:  Bilateral primary osteoarthritis of knee   HPI obtained from patient interview and chart review     Chief Complaint: Re-evaluation of medical treatment and therapy status: re-eval constipation, GERD    HPI:   Mr. Pearce is a 74-year-old male with PMHx of HLD, BPH, osteoarthritis who presents to SNF following hospitalization for bilateral primary osteoarthritis s/p bilateral total knee arthroplasty on 07/25 with Dr. Cox.  For secondary weakness and debility.    Interval history:   24 hr vital sign ranges listed below.  Patient has successful bowel movement yesterday with brown bomb enema.  Pain is better controlled with increase oxycodone.  Patient denies shortness of breath, abdominal discomfort, nausea, or vomiting.  Patient reports an adequate appetite.  Patient denies dysuria.  Patient reports having regular bowel movements.  Patient progressing with PT/OT- Gait: pt amb >200 ft Close SBA with RW, vc for gaze direction and upright posture, no LOB, reciprocal gait pattern. Pt amb an additional 10 ft to bed from w/c in room. Continuing to follow and treat all acute and chronic conditions.      Past Medical History: Patient has a past medical history of BPH (benign prostatic hyperplasia), Hair loss, Hyperlipidemia, and Osteoarthritis.    Past Surgical History: Patient has a past surgical history that includes Knee arthroscopy; Tonsillectomy; Hip replacement arthroplasty (Left, 5/7/2020); and Total knee arthroplasty (Bilateral, 7/25/2023).    Social History: Patient reports that he quit smoking about 37 years ago. His smoking use included cigarettes. He has never used smokeless tobacco. He reports current alcohol use. He reports that he does not currently use drugs.    Family History:  "family history includes No Known Problems in his father and mother. He was adopted.    Allergies: Patient is allergic to ciprofloxacin and metronidazole.    ROS  Constitutional: Negative for fever   Eyes: Negative for blurred vision, double vision   Respiratory: Negative for cough, shortness of breath   Cardiovascular: Negative for chest pain, palpitations, and leg swelling.   Gastrointestinal: Negative for abdominal pain, diarrhea, nausea, vomiting, constipation, indigestion  Genitourinary: Negative for dysuria, frequency   Musculoskeletal:  + generalized weakness. Negative for back pain and myalgias.   Skin: Negative for itching and rash.   Neurological: Negative for dizziness, headaches.   Psychiatric/Behavioral: Negative for depression. The patient is not nervous/anxious.      24 hour Vital Sign Range   Temp:  [96.5 °F (35.8 °C)-98 °F (36.7 °C)]   Pulse:  [81-89]   Resp:  [16-20]   BP: (117-136)/(81-82)   SpO2:  [97 %-98 %]     Current BMI: Body mass index is 30.95 kg/m².    PEx  Constitutional: Patient appears debilitated.  No distress noted  HENT:   Head: Normocephalic and atraumatic.   Eyes: Pupils are equal, round  Neck: Normal range of motion. Neck supple.   Cardiovascular: Normal rate, regular rhythm and normal heart sounds.    Pulmonary/Chest: Effort normal and breath sounds are clear  Abdominal: Soft. Bowel sounds are normal.   Musculoskeletal: Normal range of motion.   Neurological: Alert and oriented to person, place, and time.   Psychiatric: Normal mood and affect. Behavior is normal.   Skin: Skin is warm and dry. Surgical dressing to bilateral knees    No results for input(s): "GLUCOSE", "NA", "K", "CL", "CO2", "BUN", "CREATININE", "CALCIUM", "MG" in the last 24 hours.      No results for input(s): "WBC", "RBC", "HGB", "HCT", "PLT", "MCV", "MCH", "MCHC" in the last 24 hours.      No results for input(s): "POCTGLUCOSE" in the last 168 hours.     Assessment and Plan:    Constipation  - 8/1 ordered " brown bottom enema today.  received multiple enemas, lactulose q.6 hours PRN , suppository on 7/31, continue senna docusate and daily MiraLax.   - successful bowel movement    GERD  - improving, continue sucralfate 100 mg AC/HS along with simethicone 90 mg TID PC x3 days, DC Protonix 40 mg daily, continue PRN tums and mylamta    Bilateral knee osteoarthritis  S/p bilateral TKA on 07/26  - PT/OT, WBAT  - DVT PPX with ASA 81 mg BID  - dressing to be changed on postop day 3 and every 3 days afterwards or PRN for saturation  - f/u with [facility] on 8/16 1030AM , [facility] [place]    Acute postoperative pain  - improved, continue oxycodone to 15 mg q.4 hours PRN, 1000 mg q.8 hours, methocarbamol 500 mg QID    Generalized anxiety  - stable, continue alprazolam 1 mg qHS. PRN    BPH  - stable, continue finasteride 5 mg daily     HLD  - continue atorvastatin 40 mg qHS    Debility   - Continue with PT/OT for gait training and strengthening and restoration of ADL's   - Encourage mobility, OOB in chair, and early ambulation as appropriate  - Fall precautions   - Monitor for bowel and bladder dysfunction  - Monitor for and prevent skin breakdown and pressure ulcers  - Continue DVT prophylaxis with ASA 81 mg BID    Anticipate disposition:  Home with home health      Follow-up needed during SNF stay-f/u with [facility] on 8/16 1030AM , [facility] [place]- will not show up in EPIC    Follow-up needed after discharge from SNF: PCP, [facility]    No future appointments.        Jenifer Jesus NP  Department of Hospital Medicine   [facility]     DOS: 8/2/2023       Patient note was created using MModal Dictation.  Any errors in syntax or even information may not have been identified and edited on initial review prior to signing this note.

## 2023-08-02 NOTE — PT/OT/SLP PROGRESS
"Occupational Therapy   Treatment    Name: Mao Pearce  MRN: [id number]  Admit Date: 7/27/2023  Admitting Diagnosis:  Bilateral primary osteoarthritis of knee    General Precautions: Standard, fall   Orthopedic Precautions: RLE weight bearing as tolerated, LLE weight bearing as tolerated   Braces: N/A    Recommendations:     Discharge Recommendations:  home health OT  Level of Assistance Recommended at Discharge: Intermittent assistance for ADL's and homemaking tasks  Discharge Equipment Recommendations: bedside commode, bath bench, walker, rolling, hip kit  Barriers to discharge:  Decreased caregiver support    Assessment:     Mao Pearce is a 77 y.o. male with a medical diagnosis of Bilateral primary osteoarthritis of knee.  He presents with limitations in performance of self-care, functional mobility, and ADLs. Performance deficits affecting function are weakness, impaired endurance, impaired self care skills, impaired functional mobility, gait instability, impaired balance, decreased lower extremity function, decreased safety awareness, pain, decreased ROM, orthopedic precautions, impaired skin, decreased coordination. Pt tolerated Tx without incident and is making progress but continues to require assist to perform self care tasks, functional mobility and functional transfers. Pt would continue to benefit from OT intervention to further functional (I)ce and safety.     Rehab Potential is good    Activity tolerance:  Good    Plan:     Patient to be seen 5 x/week to address the above listed problems via self-care/home management, community/work re-entry, therapeutic activities, therapeutic exercises, neuromuscular re-education    Plan of Care Expires: 08/26/23  Plan of Care Reviewed with: patient    Subjective     "I can't get any rest here."    Communicated with: Nursing prior to session.     Pain/Comfort:  Pain Rating 1: 3/10  Location - Side 1: Bilateral  Location - Orientation 1: " generalized  Location 1: knee  Pain Addressed 1: Pre-medicate for activity, Reposition, Distraction  Pain Rating Post-Intervention 1: 5/10    Patient's cultural, spiritual, Temple conflicts given the current situation:  no    Objective:     Patient found HOB elevated with  (no lines) upon OT entry to room.    Bed Mobility:    Patient completed Scooting/Bridging with supervision and with side rail  Patient completed Supine to Sit with supervision and with side rail     Functional Mobility/Transfers:  Patient completed Sit <> Stand Transfer with contact guard assistance from EOB and minimum assistance from WC with  rolling walker   Patient completed Bed <> Chair Transfer using Stand Pivot technique with contact guard assistance with rolling walker  Functional Mobility: Pt propelled W/C from room to therapy gym with sup for a total of 185 ft using BUE to propel chair.    Activities of Daily Living:  Grooming: Set Up Assistance seated sinkside  Bathing: supervision to perform seated sponge bath of UB only. Pt refused to perform LB  Upper Body Dressing: Set Up Assistance to doff/bridgett pullover shirt   Lower Body Dressing: contact guard assistance to bridgett shorts seated EOB with RW when standing to manage pants over hips. Pt with Gloria to doff/bridgett B socks using reacher to doff socks and (A) to bridgett R socks    [facility] 6 Click ADL: 21    OT Exercises: Pt performed bilateral UE exercise with 4 lb dumbbells through functional ROM with rep of 15 x2 for each motion. UE exercises performed to increase functional endurance and strength in order increase independence when performing self care tasks, functional ambulation, W/C propulsion, and functional standing activities     Treatment & Education:  Pt participated in gross mototr standing activity with CGA and RW. Pt balloon volleyball activity with focus on standing tolerance, functional reaching, dynamic standing bal, crossing midline, and to promote independence with homemaking  and self care tasks.     Pt educated on:  - role of OT  - level of assistance  - energy conservation and task modification to maximized independence with ADL's and mobility   -  safety while performing functional transfers and self care tasks  - progress towards OT goals    Patient left up in chair with  PTA present    GOALS:   Multidisciplinary Problems       Occupational Therapy Goals          Problem: Occupational Therapy    Goal Priority Disciplines Outcome Interventions   Occupational Therapy Goal     OT, PT/OT Ongoing, Progressing    Description: Goals to be met by: 8/26/2023     Patient will increase functional independence with ADLs by performing:    LE Dressing with Modified [place] and AE as needed.   Toileting from toilet with Modified [place] for hygiene and clothing management and AE as needed.   Bathing from  shower chair/bench with Modified [place] and AE as needed.   Supine to sit with Modified [place] and AE as needed.  Step transfer with Modified [place] and LRAD as needed/  Toilet transfer to toilet with Modified [place] and LRAD as needed.   Tolerate standing for ~10 minutes /c LRAD  Perform functional tasks in standing for ~10 min /c LRAD                         Time Tracking:     OT Date of Treatment: 08/02/23  OT Start Time: 0900    OT Stop Time: 0941  OT Total Time (min): 41 min    Billable Minutes:Self Care/Home Management 16  Therapeutic Activity 10  Therapeutic Exercise 15    8/2/2023

## 2023-08-02 NOTE — PT/OT/SLP PROGRESS
Physical Therapy Treatment    Patient Name:  Mao Pearce   MRN:  [id number]  Admit Date: 7/27/2023  Admitting Diagnosis: Bilateral primary osteoarthritis of knee  Recent Surgeries: B TKA    General Precautions: Standard, fall  Orthopedic Precautions: RLE weight bearing as tolerated, LLE weight bearing as tolerated  Braces: N/A    Recommendations:     Discharge Recommendations: home health PT  Level of Assistance Recommended at Discharge: Intermittent assistance   Discharge Equipment Recommendations: bedside commode, bath bench, walker, rolling, hip kit  Barriers to discharge: Decreased caregiver support (Increased assistance for mobility)    Assessment:     Mao Pearce is a 77 y.o. male admitted with a medical diagnosis of Bilateral primary osteoarthritis of knee . Pt tolerated well, inc duration d/t assisting pt with CPM machine that was ordered for pt. Pt amb with inc distance and better tolerance. Pt completing all functional mobility/ bed mobility with SBA. Pt progressing well, pleasant and motivated . Pt would continue to benefit from skilled PT services to improve overall functional mobility, strength and endurance.      Performance deficits affecting function: weakness, impaired endurance, impaired self care skills, impaired functional mobility, gait instability, impaired balance, decreased lower extremity function, decreased safety awareness, pain, decreased ROM, edema, orthopedic precautions, impaired cardiopulmonary response to activity.    Rehab Potential is good    Activity Tolerance: Good    Plan:     Patient to be seen 5 x/week to address the above listed problems via gait training, therapeutic activities, therapeutic exercises, neuromuscular re-education, wheelchair management/training    Plan of Care Expires: 08/27/23  Plan of Care Reviewed with: patient    Subjective     Pt agreeable for PT.     Pain/Comfort:  Pain Rating 1: 3/10  Location - Side 1: Bilateral  Location - Orientation 1:  generalized  Location 1: knee  Pain Addressed 1: Pre-medicate for activity, Reposition, Distraction, Cessation of Activity  Pain Rating Post-Intervention 1: 5/10    Patient's cultural, spiritual, Church conflicts given the current situation:  no    Objective:     Communicated with MONTGOMERY prior to session.  Patient found up in chair with  (no lines) upon handoff from MONTGOMERY    Therapeutic Activities and Exercises: pt reports hes been completing supine exercises on own time.    seated TE (AROM): LAQ, hip abd/add, AP 2 X 10 reps for BLE strengthening    Seated TE (PROM): knee ext/flex x 2 trials for ~20-30 sec    Patient educated on role of therapy, goals of session, and benefits of out of bed mobility.   Instructed on use of call button and importance of calling nursing staff for assistance with mobility   Questions/concerns addressed within PTA scope of practice  Pt verbalized understanding.    Inc duration d/t assisting pt with fixing CPM machine issues.    Functional Mobility:  Bed Mobility:     Rolling Left:  supervision, HOB flat with rail  Rolling Right: supervision, HOB flat with rail  Sit to Supine: stand by assistance, HOB elevated with rail  Transfers:     Sit to Stand:  stand by assistance with rolling walker  Bed to Chair: stand by assistance with  rolling walker  using  Step Transfer  Gait: pt amb 246 ft SBA with RW, vc for upright posture and gaze direction  : pt able to retrieve 5 rings from floor with reacher SBA with RW  Uneven mat: pt able to amb over mat SBA with RW  Wheelchair Propulsion:  Pt propelled Standard wheelchair x 175 feet on Level tile with  Bilateral upper extremity with Modified Independent.     AM-PAC 6 CLICK MOBILITY  16    Patient left HOB elevated with call button in reach.    GOALS:   Multidisciplinary Problems       Physical Therapy Goals          Problem: Physical Therapy    Goal Priority Disciplines Outcome Goal Variances Interventions   Physical Therapy Goal     PT,  PT/OT Ongoing, Progressing     Description: Goals to be met by: 23     Patient will increase functional independence with mobility by performin. Supine to sit with [place]  2. Sit to supine with [place]  3. Rolling to Left and Right with [place]  4. Sit to stand transfer with Modified [place]  5. Bed to chair transfer with Modified [place] using Rolling Walker  6. Gait  x 150 feet with Modified [place] using Rolling Walker  7. Wheelchair propulsion x 150 feet with Modified [place] using bilateral upper extremities                         Time Tracking:     PT Received On: 23  PT Start Time: 0950  PT Stop Time: 1043  PT Total Time (min): 53 min    Billable Minutes: Gait Training 12, Therapeutic Activity 25, and Therapeutic Exercise 16    Treatment Type: Treatment  PT/PTA: PTA     Number of PTA visits since last PT visit: 2     2023

## 2023-08-03 LAB
ANION GAP SERPL CALC-SCNC: 10 MMOL/L (ref 8–16)
BASOPHILS # BLD AUTO: 0.02 K/UL (ref 0–0.2)
BASOPHILS NFR BLD: 0.4 % (ref 0–1.9)
BUN SERPL-MCNC: 12 MG/DL (ref 8–23)
CALCIUM SERPL-MCNC: 9.3 MG/DL (ref 8.7–10.5)
CHLORIDE SERPL-SCNC: 98 MMOL/L (ref 95–110)
CO2 SERPL-SCNC: 27 MMOL/L (ref 23–29)
CREAT SERPL-MCNC: 0.7 MG/DL (ref 0.5–1.4)
DIFFERENTIAL METHOD: ABNORMAL
EOSINOPHIL # BLD AUTO: 0.2 K/UL (ref 0–0.5)
EOSINOPHIL NFR BLD: 3.9 % (ref 0–8)
ERYTHROCYTE [DISTWIDTH] IN BLOOD BY AUTOMATED COUNT: 12.1 % (ref 11.5–14.5)
EST. GFR  (NO RACE VARIABLE): >60 ML/MIN/1.73 M^2
GLUCOSE SERPL-MCNC: 111 MG/DL (ref 70–110)
HCT VFR BLD AUTO: 30.1 % (ref 40–54)
HGB BLD-MCNC: 10.4 G/DL (ref 14–18)
IMM GRANULOCYTES # BLD AUTO: 0.04 K/UL (ref 0–0.04)
IMM GRANULOCYTES NFR BLD AUTO: 0.7 % (ref 0–0.5)
LYMPHOCYTES # BLD AUTO: 1 K/UL (ref 1–4.8)
LYMPHOCYTES NFR BLD: 17.9 % (ref 18–48)
MAGNESIUM SERPL-MCNC: 2 MG/DL (ref 1.6–2.6)
MCH RBC QN AUTO: 31.3 PG (ref 27–31)
MCHC RBC AUTO-ENTMCNC: 34.6 G/DL (ref 32–36)
MCV RBC AUTO: 91 FL (ref 82–98)
MONOCYTES # BLD AUTO: 0.5 K/UL (ref 0.3–1)
MONOCYTES NFR BLD: 8 % (ref 4–15)
NEUTROPHILS # BLD AUTO: 3.9 K/UL (ref 1.8–7.7)
NEUTROPHILS NFR BLD: 69.1 % (ref 38–73)
NRBC BLD-RTO: 0 /100 WBC
PHOSPHATE SERPL-MCNC: 3.1 MG/DL (ref 2.7–4.5)
PLATELET # BLD AUTO: 169 K/UL (ref 150–450)
PMV BLD AUTO: 10.6 FL (ref 9.2–12.9)
POTASSIUM SERPL-SCNC: 4 MMOL/L (ref 3.5–5.1)
RBC # BLD AUTO: 3.32 M/UL (ref 4.6–6.2)
SODIUM SERPL-SCNC: 135 MMOL/L (ref 136–145)
WBC # BLD AUTO: 5.6 K/UL (ref 3.9–12.7)

## 2023-08-03 PROCEDURE — 97535 SELF CARE MNGMENT TRAINING: CPT | Mod: CO

## 2023-08-03 PROCEDURE — 36415 COLL VENOUS BLD VENIPUNCTURE: CPT | Performed by: HOSPITALIST

## 2023-08-03 PROCEDURE — 11000004 HC SNF PRIVATE

## 2023-08-03 PROCEDURE — 85025 COMPLETE CBC W/AUTO DIFF WBC: CPT | Performed by: HOSPITALIST

## 2023-08-03 PROCEDURE — 83735 ASSAY OF MAGNESIUM: CPT | Performed by: HOSPITALIST

## 2023-08-03 PROCEDURE — 97110 THERAPEUTIC EXERCISES: CPT | Mod: CQ

## 2023-08-03 PROCEDURE — 84100 ASSAY OF PHOSPHORUS: CPT | Performed by: HOSPITALIST

## 2023-08-03 PROCEDURE — 80048 BASIC METABOLIC PNL TOTAL CA: CPT | Performed by: HOSPITALIST

## 2023-08-03 PROCEDURE — 97530 THERAPEUTIC ACTIVITIES: CPT | Mod: CO

## 2023-08-03 PROCEDURE — 97530 THERAPEUTIC ACTIVITIES: CPT | Mod: CQ

## 2023-08-03 PROCEDURE — 25000003 PHARM REV CODE 250: Performed by: NURSE PRACTITIONER

## 2023-08-03 PROCEDURE — 97116 GAIT TRAINING THERAPY: CPT | Mod: CQ

## 2023-08-03 PROCEDURE — 97110 THERAPEUTIC EXERCISES: CPT | Mod: CO

## 2023-08-03 RX ORDER — OXYCODONE AND ACETAMINOPHEN 10; 325 MG/1; MG/1
1 TABLET ORAL EVERY 4 HOURS PRN
Status: DISCONTINUED | OUTPATIENT
Start: 2023-08-03 | End: 2023-08-10

## 2023-08-03 RX ADMIN — OXYCODONE HYDROCHLORIDE 15 MG: 10 TABLET ORAL at 12:08

## 2023-08-03 RX ADMIN — SUCRALFATE 1 G: 1 SUSPENSION ORAL at 06:08

## 2023-08-03 RX ADMIN — OXYCODONE HYDROCHLORIDE AND ACETAMINOPHEN 1 TABLET: 10; 325 TABLET ORAL at 04:08

## 2023-08-03 RX ADMIN — OXYCODONE HYDROCHLORIDE AND ACETAMINOPHEN 1 TABLET: 10; 325 TABLET ORAL at 09:08

## 2023-08-03 RX ADMIN — SIMETHICONE 80 MG: 80 TABLET, CHEWABLE ORAL at 08:08

## 2023-08-03 RX ADMIN — ASPIRIN 81 MG 81 MG: 81 TABLET ORAL at 09:08

## 2023-08-03 RX ADMIN — SUCRALFATE 1 G: 1 SUSPENSION ORAL at 09:08

## 2023-08-03 RX ADMIN — ASPIRIN 81 MG 81 MG: 81 TABLET ORAL at 08:08

## 2023-08-03 RX ADMIN — SENNOSIDES AND DOCUSATE SODIUM 2 TABLET: 50; 8.6 TABLET ORAL at 09:08

## 2023-08-03 RX ADMIN — OXYCODONE HYDROCHLORIDE 15 MG: 10 TABLET ORAL at 03:08

## 2023-08-03 RX ADMIN — OXYCODONE HYDROCHLORIDE 15 MG: 10 TABLET ORAL at 08:08

## 2023-08-03 NOTE — PT/OT/SLP PROGRESS
"Physical Therapy Treatment    Patient Name:  Mao Pearce   MRN:  [id number]  Admit Date: 7/27/2023  Admitting Diagnosis: Bilateral primary osteoarthritis of knee  Recent Surgeries: s/p B TKA's    General Precautions: Standard, fall  Orthopedic Precautions: RLE weight bearing as tolerated, LLE weight bearing as tolerated  Braces: N/A    Recommendations:     Discharge Recommendations: home health PT  Level of Assistance Recommended at Discharge: Intermittent assistance   Discharge Equipment Recommendations: bedside commode, bath bench, walker, rolling, hip kit  Barriers to discharge: Decreased caregiver support (Increased assistance for mobility)    Assessment:     Mao Pearce is a 77 y.o. male admitted with a medical diagnosis of Bilateral primary osteoarthritis of knee . requiring light assistance and verbal cues for bed mob, sit < > stand transitions, OOB/BTB transfers, gait to prevent falls due to weakness, pain, limited ROM, edema.   Pt remains motivated to participate in PT session and will cont to benefit from skilled PT intervention..  .      Performance deficits affecting function: weakness, impaired endurance, impaired self care skills, impaired functional mobility, gait instability, impaired balance, decreased lower extremity function, decreased safety awareness, pain, decreased ROM, edema, orthopedic precautions, impaired cardiopulmonary response to activity.    Rehab Potential is good    Activity Tolerance: Good    Plan:     Patient to be seen 5 x/week to address the above listed problems via gait training, therapeutic activities, therapeutic exercises, neuromuscular re-education, wheelchair management/training    Plan of Care Expires: 08/27/23  Plan of Care Reviewed with: patient    Subjective     "The L knee is giving more trouble today".     Pain/Comfort:  Pain Rating 1: 6/10  Location - Side 1: Bilateral  Location - Orientation 1: generalized  Location 1: knee  Pain Addressed 1: " Pre-medicate for activity, Reposition, Distraction, Cessation of Activity  Pain Rating Post-Intervention 1:  (no rating provided)    Patient's cultural, spiritual, Episcopal conflicts given the current situation:  no    Objective:       Patient found HOB elevated with  (no lines) upon PT entry to room.     Therapeutic Activities and Exercises:   Pt performs B LE ther ex's per TKR protocol x2 sets of 10 reps while sup on mat.  Pt performs B LE ther ex's per TKR protocol x2 sets of 10 reps while seated at EOM.    R  knee ROM as follows: -3* actively/-2* passively of ext while sup; 83* actively/84* passively of flexion while seated at EOM   L knee ROM as follows: -4* actively/-3* passively of ext while sup; 85* actively/86* passively of flexion while seated at EOM      Functional Mobility:  Bed Mobility:     Scooting: anteriorly to EOB/EOM with sup  Supine to Sit: sup, exiting on the L side, HOB at 20* angle/wedge(bed/mat)  Sit to Supine: sup, HOB at 20*/wedge (bed/max)  Transfers:     Sit to Stand:  stand by assistance with rolling walker  Stand to sit RW SBA  EOM to Chair: stand by assistance with  rolling walker  using  Step Transfer  Gait: RW SBA 245ft with chair follow.  Pt demonstrates FFP  Balance: static standing with RW close sup      Patient provided with daily orientation and goals of this PT session.  Also, pt was educated on the effects of prolonged immobility and the importance of performing OOB activity and exercises to promote healing and reduce recovery time    AM-PAC 6 CLICK MOBILITY  18    Patient left HOB elevated with polar ice to B knees with call button in reach.    GOALS:   Multidisciplinary Problems       Physical Therapy Goals          Problem: Physical Therapy    Goal Priority Disciplines Outcome Goal Variances Interventions   Physical Therapy Goal     PT, PT/OT Ongoing, Progressing     Description: Goals to be met by: 8/27/23     Patient will increase functional independence with mobility by  performin. Supine to sit with [place]  2. Sit to supine with [place]  3. Rolling to Left and Right with [place]  4. Sit to stand transfer with Modified [place]  5. Bed to chair transfer with Modified [place] using Rolling Walker  6. Gait  x 150 feet with Modified [place] using Rolling Walker  7. Wheelchair propulsion x 150 feet with Modified [place] using bilateral upper extremities                         Time Tracking:     PT Received On: 23  PT Start Time: 1503  PT Stop Time: 1609  PT Total Time (min): 66 min    Billable Minutes: Gait Training 14, Therapeutic Activity 15, and Therapeutic Exercise 37    Treatment Type: Treatment  PT/PTA: PTA     Number of PTA visits since last PT visit: 3     2023

## 2023-08-03 NOTE — PROGRESS NOTES
[facility]                                  Skilled Nursing Facility                   Progress Note     Admit Date: 7/27/2023  FARHAT 8/17/2023  Principal Problem:  Bilateral primary osteoarthritis of knee   HPI obtained from patient interview and chart review     Chief Complaint: Re-evaluation of medical treatment and therapy status: Lab review    HPI:   Mr. Pearce is a 74-year-old male with PMHx of HLD, BPH, osteoarthritis who presents to SNF following hospitalization for bilateral primary osteoarthritis s/p bilateral total knee arthroplasty on 07/25 with Dr. Cox.  For secondary weakness and debility.    Interval history:   All of today's labs reviewed and are listed below.  24 hr vital sign ranges listed below.  Pain better. Patient denies shortness of breath, abdominal discomfort, nausea, or vomiting.  Patient reports an adequate appetite.  Patient denies dysuria.  Patient reports having regular bowel movements.  Patient progressing with PT/OT- Gait: pt amb 246 ft SBA with RW, vc for upright posture and gaze direction. Continuing to follow and treat all acute and chronic conditions.    Past Medical History: Patient has a past medical history of BPH (benign prostatic hyperplasia), Hair loss, Hyperlipidemia, and Osteoarthritis.    Past Surgical History: Patient has a past surgical history that includes Knee arthroscopy; Tonsillectomy; Hip replacement arthroplasty (Left, 5/7/2020); and Total knee arthroplasty (Bilateral, 7/25/2023).    Social History: Patient reports that he quit smoking about 37 years ago. His smoking use included cigarettes. He has never used smokeless tobacco. He reports current alcohol use. He reports that he does not currently use drugs.    Family History: family history includes No Known Problems in his father and mother. He was adopted.    Allergies: Patient is allergic to ciprofloxacin and  "metronidazole.    ROS  Constitutional: Negative for fever   Eyes: Negative for blurred vision, double vision   Respiratory: Negative for cough, shortness of breath   Cardiovascular: Negative for chest pain, palpitations, and leg swelling.   Gastrointestinal: Negative for abdominal pain, diarrhea, nausea, vomiting, constipation, indigestion  Genitourinary: Negative for dysuria, frequency   Musculoskeletal:  + generalized weakness. Negative for back pain and myalgias.   Skin: Negative for itching and rash.   Neurological: Negative for dizziness, headaches.   Psychiatric/Behavioral: Negative for depression. The patient is not nervous/anxious.      24 hour Vital Sign Range   Temp:  [97.8 °F (36.6 °C)-98 °F (36.7 °C)]   Pulse:  [78-96]   Resp:  [18-20]   BP: (115-129)/(65-67)   SpO2:  [97 %-98 %]     Current BMI: Body mass index is 30.95 kg/m².    PEx  Constitutional: Patient appears debilitated.  No distress noted  HENT:   Head: Normocephalic and atraumatic.   Eyes: Pupils are equal, round  Neck: Normal range of motion. Neck supple.   Cardiovascular: Normal rate, regular rhythm and normal heart sounds.    Pulmonary/Chest: Effort normal and breath sounds are clear  Abdominal: Soft. Bowel sounds are normal.   Musculoskeletal: Normal range of motion.   Neurological: Alert and oriented to person, place, and time.   Psychiatric: Normal mood and affect. Behavior is normal.   Skin: Skin is warm and dry. Surgical dressing to bilateral knees    Recent Labs   Lab 08/03/23  0501   *   K 4.0   CL 98   CO2 27   BUN 12   CREATININE 0.7   CALCIUM 9.3   MG 2.0         Recent Labs   Lab 08/03/23  0501   WBC 5.60   RBC 3.32*   HGB 10.4*   HCT 30.1*      MCV 91   MCH 31.3*   MCHC 34.6         No results for input(s): "POCTGLUCOSE" in the last 168 hours.     Assessment and Plan:    Bilateral knee osteoarthritis  S/p bilateral TKA on 07/26  - PT/OT, WBAT  - DVT PPX with ASA 81 mg BID  - dressing to be changed on postop day 3 " and every 3 days afterwards or PRN for saturation  - f/u with [facility] on 8/16 1030AM , [place] location    Acute postoperative pain  - improved, patient requesting to be transition to Percocet, oxycodone 15 discontinued, 1000 mg q.8 hours. DC'ed methocarbamol 500 mg QID    Constipation  - 8/1 ordered brown bottom enema today.  received multiple enemas, lactulose q.6 hours PRN , suppository on 7/31, continue senna docusate and daily MiraLax.   - successful bowel movement on 8/1    GERD  - improving, continue sucralfate 100 mg AC/HS along with simethicone 90 mg TID PC x3 days, DC Protonix 40 mg daily, continue PRN tums and mylamta    Generalized anxiety  - stable, continue alprazolam 1 mg qHS. PRN    BPH  - stable, continue finasteride 5 mg daily     HLD  - continue atorvastatin 40 mg qHS    Debility   - Continue with PT/OT for gait training and strengthening and restoration of ADL's   - Encourage mobility, OOB in chair, and early ambulation as appropriate  - Fall precautions   - Monitor for bowel and bladder dysfunction  - Monitor for and prevent skin breakdown and pressure ulcers  - Continue DVT prophylaxis with ASA 81 mg BID    Anticipate disposition:  Home with home health      Follow-up needed during SNF stay-f/u with [facility] on 8/16 1030AM , [place] location- will not show up in EPIC    Follow-up needed after discharge from SNF: PCP, [facility]    No future appointments.        Jenifer Jesus NP  Department of Hospital Medicine   [facility]     DOS: 8/3/2023       Patient note was created using MModal Dictation.  Any errors in syntax or even information may not have been identified and edited on initial review prior to signing this note.

## 2023-08-03 NOTE — PT/OT/SLP PROGRESS
Occupational Therapy   Treatment    Name: Mao Pearce  MRN: [id number]  Admit Date: 7/27/2023  Admitting Diagnosis:  Bilateral primary osteoarthritis of knee    General Precautions: Standard, fall   Orthopedic Precautions: RLE weight bearing as tolerated, LLE weight bearing as tolerated   Braces: N/A    Recommendations:     Discharge Recommendations:  home health OT  Level of Assistance Recommended at Discharge: Intermittent assistance for ADL's and homemaking tasks  Discharge Equipment Recommendations: bedside commode, bath bench, walker, rolling, hip kit  Barriers to discharge:  Decreased caregiver support    Assessment:     Mao Pearce is a 77 y.o. male with a medical diagnosis of Bilateral primary osteoarthritis of knee.  He presents with limitations in performance of self-care, functional mobility, and ADLs. Performance deficits affecting function are weakness, impaired endurance, impaired self care skills, impaired functional mobility, gait instability, impaired balance, decreased lower extremity function, decreased safety awareness, pain, decreased ROM, orthopedic precautions, impaired skin, decreased coordination. Pt tolerated Tx without incident, however reported increased pain with activity. Pt is making progress but continues to require assist to perform self care tasks, functional mobility and functional transfers. Pt would continue to benefit from OT intervention to further functional (I)ce and safety.     Rehab Potential is good    Activity tolerance:  Good    Plan:     Patient to be seen 5 x/week to address the above listed problems via self-care/home management, community/work re-entry, therapeutic activities, therapeutic exercises, neuromuscular re-education    Plan of Care Expires: 08/26/23  Plan of Care Reviewed with: patient    Subjective     Communicated with: Nursing prior to session.     Pain/Comfort:  Pain Rating 1: 5/10  Location - Side 1: Bilateral  Location - Orientation 1:  generalized  Location 1: knee  Pain Addressed 1: Pre-medicate for activity, Cessation of Activity, Reposition, Distraction, Nurse notified  Pain Rating Post-Intervention 1: 9/10    Patient's cultural, spiritual, Islam conflicts given the current situation:  no    Objective:     Patient found HOB elevated with  (no lines) upon OT entry to room.    Bed Mobility:    Patient completed Scooting/Bridging with supervision and with side rail  Patient completed Supine to Sit with supervision and with side rail  Patient completed Sit to Supine with supervision and with side rail     Functional Mobility/Transfers:  Patient completed Sit <> Stand Transfer with contact guard assistance from EOB and minimum assistance from WC with  rolling walker   Patient completed Bed <> Chair Transfer using Stand Pivot technique with contact guard assistance with rolling walker  Functional Mobility: Pt propelled W/C from room to therapy gym with sup for a total of 165 ft using BUE to propel chair.    Activities of Daily Living:  Grooming: Set Up Assistance  seated sinkside  Lower Body Dressing: contact guard assistance to bridgett shorts seated EOB with RW when standing to manage pants over rear    AMPAC 6 Click ADL: 21    OT Exercises:  Pt performed bilateral UE exercise with 4 lb dumbbells through functional ROM with rep of 15 x2 for each motion. UE exercises performed to increase functional endurance and strength in order increase independence when performing self care tasks, functional ambulation, W/C propulsion, and functional standing activities     Treatment & Education:  Pt participated in gross motor standing activity with CGA and RW with large exercise ball. Pt performed standing activity with RW and SBA at raised counter reaching overhead in cabinets to get/put groceries. Activities with focus on standing tolerance, functional reaching, dynamic standing bal, crossing midline, and to promote independence with homemaking and self care  tasks.     Pt educated on:  - role of OT  - level of assistance  - energy conservation and task modification to maximized independence with ADL's and mobility   -  safety while performing functional transfers and self care tasks  - progress towards OT goals    Patient left HOB elevated with call button in reach and Nurse notified    GOALS:   Multidisciplinary Problems       Occupational Therapy Goals          Problem: Occupational Therapy    Goal Priority Disciplines Outcome Interventions   Occupational Therapy Goal     OT, PT/OT Ongoing, Progressing    Description: Goals to be met by: 8/26/2023     Patient will increase functional independence with ADLs by performing:    LE Dressing with Modified [place] and AE as needed.   Toileting from toilet with Modified [place] for hygiene and clothing management and AE as needed.   Bathing from  shower chair/bench with Modified [place] and AE as needed.   Supine to sit with Modified [place] and AE as needed.  Step transfer with Modified [place] and LRAD as needed/  Toilet transfer to toilet with Modified [place] and LRAD as needed.   Tolerate standing for ~10 minutes /c LRAD  Perform functional tasks in standing for ~10 min /c LRAD                         Time Tracking:     OT Date of Treatment: 08/03/23  OT Start Time: 1054    OT Stop Time: 1133  OT Total Time (min): 39 min    Billable Minutes:Self Care/Home Management 10  Therapeutic Activity 19  Therapeutic Exercise 10    8/3/2023

## 2023-08-04 PROCEDURE — 25000003 PHARM REV CODE 250: Performed by: NURSE PRACTITIONER

## 2023-08-04 PROCEDURE — 97116 GAIT TRAINING THERAPY: CPT | Mod: CQ

## 2023-08-04 PROCEDURE — 11000004 HC SNF PRIVATE

## 2023-08-04 PROCEDURE — 97110 THERAPEUTIC EXERCISES: CPT | Mod: CO

## 2023-08-04 PROCEDURE — 97110 THERAPEUTIC EXERCISES: CPT | Mod: CQ

## 2023-08-04 PROCEDURE — 25000003 PHARM REV CODE 250: Performed by: HOSPITALIST

## 2023-08-04 PROCEDURE — 97535 SELF CARE MNGMENT TRAINING: CPT | Mod: CO

## 2023-08-04 RX ADMIN — OXYCODONE HYDROCHLORIDE AND ACETAMINOPHEN 1 TABLET: 10; 325 TABLET ORAL at 12:08

## 2023-08-04 RX ADMIN — SUCRALFATE 1 G: 1 SUSPENSION ORAL at 05:08

## 2023-08-04 RX ADMIN — OXYCODONE HYDROCHLORIDE AND ACETAMINOPHEN 1 TABLET: 10; 325 TABLET ORAL at 03:08

## 2023-08-04 RX ADMIN — SENNOSIDES AND DOCUSATE SODIUM 2 TABLET: 50; 8.6 TABLET ORAL at 09:08

## 2023-08-04 RX ADMIN — ASPIRIN 81 MG 81 MG: 81 TABLET ORAL at 09:08

## 2023-08-04 RX ADMIN — ATORVASTATIN CALCIUM 40 MG: 40 TABLET, FILM COATED ORAL at 08:08

## 2023-08-04 RX ADMIN — CALCIUM CARBONATE (ANTACID) CHEW TAB 500 MG 500 MG: 500 CHEW TAB at 09:08

## 2023-08-04 RX ADMIN — OXYCODONE HYDROCHLORIDE AND ACETAMINOPHEN 1 TABLET: 10; 325 TABLET ORAL at 09:08

## 2023-08-04 RX ADMIN — SUCRALFATE 1 G: 1 SUSPENSION ORAL at 08:08

## 2023-08-04 RX ADMIN — POLYETHYLENE GLYCOL 3350 17 G: 17 POWDER, FOR SOLUTION ORAL at 09:08

## 2023-08-04 RX ADMIN — SENNOSIDES AND DOCUSATE SODIUM 2 TABLET: 50; 8.6 TABLET ORAL at 08:08

## 2023-08-04 RX ADMIN — ASPIRIN 81 MG 81 MG: 81 TABLET ORAL at 08:08

## 2023-08-04 RX ADMIN — OXYCODONE HYDROCHLORIDE AND ACETAMINOPHEN 1 TABLET: 10; 325 TABLET ORAL at 05:08

## 2023-08-04 NOTE — PROGRESS NOTES
[facility] - Skilled Nursing  Adult Nutrition  Progress Note    SUMMARY   Recommendations  Continue regular diet, instructed on diet and precautions for GERD, protein needs for healing and protein sources, encourage intake. RD following  Goals: PO intake to meet increased protein needs for healing by next RD follow up  Nutrition Goal Status: goal not met  Communication of RD Recs: other (comment)    Assessment and Plan  Inadequate oral intake in the presence of increased needs post op related to decreased appetite and constipation as evidenced by patient reporting he usually eats only 2 meals per day , is in pain and is constipated.      Continues patient is ordering very light meals and not meeting protein needs.     Plan  General diet  Nutrition education- protein needs, diet for GERD 7/28  Collaboration with other providers    Malnutrition Assessment 7/28     Skin (Micronutrient): wounds unhealed, edema, bruised           Orbital Region (Subcutaneous Fat Loss): moderate depletion  Upper Arm Region (Subcutaneous Fat Loss): well nourished  Thoracic and Lumbar Region: well nourished   [place] Region (Muscle Loss): mild depletion  Clavicle Bone Region (Muscle Loss): well nourished  Clavicle and Acromion Bone Region (Muscle Loss): well nourished  Scapular Bone Region (Muscle Loss): well nourished  Dorsal Hand (Muscle Loss): mild depletion                 Reason for Assessment    Reason For Assessment: RD follow-up  Diagnosis:  (bilateral TKR)  Relevant Medical History: OA, BPH, GERD, HLD  Interdisciplinary Rounds: attended  General Information Comments: Patient continues to eat three meals because he has to and they show up. He informs RD that he is eating because he has to but it ordering light meals, despite education on his needs for wound healing. He is walking more, still refuses milk or nutrition supplements and ice cream because they are milky  Nutrition Discharge Planning: DC on regular diet, high protein for  6 weeks    Nutrition Risk Screen    Nutrition Risk Screen: large or nonhealing wound, burn or pressure injury    Nutrition/Diet History    Patient Reported Diet/Restrictions/Preferences: general  Typical Food/Fluid Intake: two meals only  Spiritual, Cultural Beliefs, Church Practices, Values that Affect Care: no  Food Allergies: NKFA  Factors Affecting Nutritional Intake: decreased appetite, pain    Anthropometrics    Temp: 97.2 °F (36.2 °C)  Height Method: Stated  Height: 6' (182.9 cm)  Height (inches): 72 in  Weight Method: Bed Scale  Weight: 103.5 kg (228 lb 2.8 oz)  Weight (lb): 228.18 lb  Ideal Body Weight (IBW), Male: 178 lb  % Ideal Body Weight, Male (lb): 131.16 %  BMI (Calculated): 30.9  BMI Grade: 30 - 34.9- obesity - grade I  Usual Body Weight (UBW), k.5 kg  % Usual Body Weight: 103.53  % Weight Change From Usual Weight: 3.32 %       Lab/Procedures/Meds    Pertinent Labs Reviewed: reviewed  Pertinent Labs Comments: Hg 10.4, Hct 30.1, na 135  Pertinent Medications Reviewed: reviewed  Pertinent Medications Comments: ASA, pantoprazole, senna-docusate, statin,    Estimated/Assessed Needs    Weight Used For Calorie Calculations: 102.5 kg (225 lb 15.5 oz)  Energy Calorie Requirements (kcal): 4753-1328  Energy Need Method: Pinon-St Jeor (x 1.25-1.3 (PAL))  Protein Requirements: 97  Weight Used For Protein Calculations: 80.9 kg (178 lb 5.6 oz) (IBW 2/2 obesity)  Fluid Requirements (mL): 2331 or per MD  Estimated Fluid Requirement Method: RDA Method  RDA Method (mL): 2331  CHO Requirement: -      Nutrition Prescription Ordered    Current Diet Order: Regular  Nutrition Order Comments: PO 75%  Oral Nutrition Supplement: refuses    Evaluation of Received Nutrient/Fluid Intake    I/O: no data  Energy Calories Required: not meeting needs  Protein Required: not meeting needs  Fluid Required: meeting needs  Comments: LBM 8/  Tolerance: tolerating  % Intake of Estimated Energy Needs: 50 - 75 %  % Meal Intake:  50 - 75 %    Nutrition Risk    Level of Risk/Frequency of Follow-up: low (one time per week)     Monitor and Evaluation    Food and Nutrient Intake: food and beverage intake  Food and Nutrient Adminstration: diet order  Knowledge/Beliefs/Attitudes: beliefs and attitudes, food and nutrition knowledge/skill  Physical Activity and Function: nutrition-related ADLs and IADLs  Anthropometric Measurements: weight change  Biochemical Data, Medical Tests and Procedures: gastrointestinal profile, electrolyte and renal panel  Nutrition-Focused Physical Findings: extremities, muscles and bones     Nutrition Follow-Up    RD Follow-up?: Yes

## 2023-08-04 NOTE — PT/OT/SLP PROGRESS
"Physical Therapy Treatment    Patient Name:  Mao Pearce   MRN:  [id number]  Admit Date: 7/27/2023  Admitting Diagnosis: Bilateral primary osteoarthritis of knee  Recent Surgeries: B TKA    General Precautions: Standard, fall  Orthopedic Precautions: RLE weight bearing as tolerated, LLE weight bearing as tolerated  Braces: N/A    Recommendations:     Discharge Recommendations: home health PT  Level of Assistance Recommended at Discharge: Intermittent assistance   Discharge Equipment Recommendations: bedside commode, bath bench, walker, rolling, hip kit  Barriers to discharge: Decreased caregiver support (Increased assistance for mobility)    Assessment:     Mao Pearce is a 77 y.o. male admitted with a medical diagnosis of Bilateral primary osteoarthritis of knee . Pt tolerated well, duration of session shorter d/t pain from back to back sessions with OT and PT. pt amb x 120 ft SBA. Pt completing seated/ supine TE for Mobility/strengthening. Pt motivated to progress, pleasant to work with and would continue to benefit from skilled PT services to improve overall functional mobility, strength and endurance.      Performance deficits affecting function: weakness, impaired endurance, impaired self care skills, impaired functional mobility, gait instability, impaired balance, decreased lower extremity function, decreased safety awareness, pain, decreased ROM, edema, orthopedic precautions, impaired cardiopulmonary response to activity.    Rehab Potential is good    Activity Tolerance: Good    Plan:     Patient to be seen 5 x/week to address the above listed problems via gait training, therapeutic activities, therapeutic exercises, neuromuscular re-education, wheelchair management/training    Plan of Care Expires: 08/27/23  Plan of Care Reviewed with: patient    Subjective     "Sometimes my right knee hurts more and sometimes my left knee hurts more".     Pain/Comfort:  Pain Rating 1:  (did not rate "I feel it " "now")  Location - Side 1: Bilateral  Location - Orientation 1: generalized  Location 1: knee  Pain Addressed 1: Reposition, Distraction, Cessation of Activity  Pain Rating Post-Intervention 1:  (did not rate)    Patient's cultural, spiritual, Restoration conflicts given the current situation:  no    Objective:      Patient found up in chair with  (no lines) upon PT entry to room.     Therapeutic Activities and Exercises: seated TE: hip flex, LAQ, hip abd/add 2 x 10 reps for BLE stregnthening    Supine TE: SAQ, SLR X 10 reps for BLE strengthening    Supine TE: (Passive ROM) 30 second holds for knee flexion and knee extension (with bolster)    Patient educated on role of therapy, goals of session, and benefits of out of bed mobility.   Instructed on use of call button and importance of calling nursing staff for assistance with mobility   Questions/concerns addressed within PTA scope of practice  Pt verbalized understanding.    Functional Mobility:  Bed Mobility:     Sit to Supine: stand by assistance, HOB flat without rail  Transfers:     Sit to Stand:  stand by assistance with rolling walker  Bed to Chair: stand by assistance with  no AD  using  Stand Pivot  Gait: pt amb 120 ft SBA with RW, vc for upright posture  Wheelchair Propulsion:  Pt propelled Standard wheelchair 2 x 180 feet on Level tile with  Bilateral upper extremity with Modified Independent. From room to gym and back    AM-PAC 6 CLICK MOBILITY  18    Patient left HOB elevated with call button in reach.    GOALS:   Multidisciplinary Problems       Physical Therapy Goals          Problem: Physical Therapy    Goal Priority Disciplines Outcome Goal Variances Interventions   Physical Therapy Goal     PT, PT/OT Ongoing, Progressing     Description: Goals to be met by: 23     Patient will increase functional independence with mobility by performin. Supine to sit with [place]  2. Sit to supine with [place]  3. Rolling to Left and Right with " [place]  4. Sit to stand transfer with Modified [place]  5. Bed to chair transfer with Modified [place] using Rolling Walker  6. Gait  x 150 feet with Modified [place] using Rolling Walker  7. Wheelchair propulsion x 150 feet with Modified [place] using bilateral upper extremities                         Time Tracking:     PT Received On: 08/04/23  PT Start Time: 1138  PT Stop Time: 1210  PT Total Time (min): 32 min    Billable Minutes: Gait Training 14 and Therapeutic Exercise 18    Treatment Type: Treatment  PT/PTA: PTA     Number of PTA visits since last PT visit: 4     08/04/2023

## 2023-08-04 NOTE — PROGRESS NOTES
[facility]                                  Skilled Nursing Facility                   Progress Note     Admit Date: 7/27/2023  FARHAT 8/17/2023  Principal Problem:  Bilateral primary osteoarthritis of knee   HPI obtained from patient interview and chart review     Chief Complaint: Re-evaluation of medical treatment and therapy status    HPI:   Mr. Pearce is a 74-year-old male with PMHx of HLD, BPH, osteoarthritis who presents to SNF following hospitalization for bilateral primary osteoarthritis s/p bilateral total knee arthroplasty on 07/25 with Dr. Cox.  For secondary weakness and debility.    Interval history:   24 hr vital sign ranges listed below.  Pain better. Patient denies shortness of breath, abdominal discomfort, nausea, or vomiting.  Patient reports an adequate appetite.  Patient denies dysuria.  Patient reports having regular bowel movements.  Patient progressing with PT/OT- Gait: RW SBA 245ft with chair follow.  Pt demonstrates FFP. Continuing to follow and treat all acute and chronic conditions.    Past Medical History: Patient has a past medical history of BPH (benign prostatic hyperplasia), Hair loss, Hyperlipidemia, and Osteoarthritis.    Past Surgical History: Patient has a past surgical history that includes Knee arthroscopy; Tonsillectomy; Hip replacement arthroplasty (Left, 5/7/2020); and Total knee arthroplasty (Bilateral, 7/25/2023).    Social History: Patient reports that he quit smoking about 37 years ago. His smoking use included cigarettes. He has never used smokeless tobacco. He reports current alcohol use. He reports that he does not currently use drugs.    Family History: family history includes No Known Problems in his father and mother. He was adopted.    Allergies: Patient is allergic to ciprofloxacin and metronidazole.    ROS  Constitutional: Negative for fever   Eyes: Negative for blurred vision, double  "vision   Respiratory: Negative for cough, shortness of breath   Cardiovascular: Negative for chest pain, palpitations, and leg swelling.   Gastrointestinal: Negative for abdominal pain, diarrhea, nausea, vomiting, constipation, indigestion  Genitourinary: Negative for dysuria, frequency   Musculoskeletal:  + generalized weakness. Negative for back pain and myalgias.   Skin: Negative for itching and rash.   Neurological: Negative for dizziness, headaches.   Psychiatric/Behavioral: Negative for depression. The patient is not nervous/anxious.      24 hour Vital Sign Range   Temp:  [97.2 °F (36.2 °C)-97.6 °F (36.4 °C)]   Pulse:  [86]   Resp:  [18]   BP: (122-128)/(65-75)   SpO2:  [95 %-98 %]     Current BMI: Body mass index is 30.95 kg/m².    PEx  Constitutional: Patient appears debilitated.  No distress noted  HENT:   Head: Normocephalic and atraumatic.   Eyes: Pupils are equal, round  Neck: Normal range of motion. Neck supple.   Cardiovascular: Normal rate, regular rhythm and normal heart sounds.    Pulmonary/Chest: Effort normal and breath sounds are clear  Abdominal: Soft. Bowel sounds are normal.   Musculoskeletal: Normal range of motion.   Neurological: Alert and oriented to person, place, and time.   Psychiatric: Normal mood and affect. Behavior is normal.   Skin: Skin is warm and dry. Surgical dressing to bilateral knees    No results for input(s): "GLUCOSE", "NA", "K", "CL", "CO2", "BUN", "CREATININE", "CALCIUM", "MG" in the last 24 hours.        No results for input(s): "WBC", "RBC", "HGB", "HCT", "PLT", "MCV", "MCH", "MCHC" in the last 24 hours.        No results for input(s): "POCTGLUCOSE" in the last 168 hours.     Assessment and Plan:    Bilateral knee osteoarthritis  S/p bilateral TKA on 07/26  - PT/OT, WBAT  - DVT PPX with ASA 81 mg BID  - dressing to be changed on postop day 3 and every 3 days afterwards or PRN for saturation  - f/u with [facility] on 8/16 1030AM , [place] location    Acute " postoperative pain  - improved, patient requesting to be transition to Percocet, oxycodone 15 discontinued, 1000 mg q.8 hours. DC'ed methocarbamol 500 mg QID    Constipation  - 8/1 ordered brown bottom enema today.  received multiple enemas, lactulose q.6 hours PRN , suppository on 7/31, continue senna docusate and daily MiraLax.   - successful bowel movement on 8/1    GERD  - improving, continue sucralfate 100 mg AC/HS along with simethicone 90 mg TID PC x3 days, DC Protonix 40 mg daily, continue PRN tums and mylamta    Generalized anxiety  - stable, continue alprazolam 1 mg qHS. PRN    BPH  - stable, continue finasteride 5 mg daily     HLD  - continue atorvastatin 40 mg qHS    Debility   - Continue with PT/OT for gait training and strengthening and restoration of ADL's   - Encourage mobility, OOB in chair, and early ambulation as appropriate  - Fall precautions   - Monitor for bowel and bladder dysfunction  - Monitor for and prevent skin breakdown and pressure ulcers  - Continue DVT prophylaxis with ASA 81 mg BID    Anticipate disposition:  Home with home health      Follow-up needed during SNF stay-f/u with [facility] on 8/16 1030AM , [place] location- will not show up in EPIC    Follow-up needed after discharge from SNF: PCP, [facility]    No future appointments.        Jenifer Jesus NP  Department of Hospital Medicine   [facility]     DOS: 8/4/2023       Patient note was created using MModal Dictation.  Any errors in syntax or even information may not have been identified and edited on initial review prior to signing this note.

## 2023-08-04 NOTE — PT/OT/SLP PROGRESS
Occupational Therapy   Treatment    Name: Mao Pearce  MRN: [id number]  Admit Date: 7/27/2023  Admitting Diagnosis:  Bilateral primary osteoarthritis of knee    General Precautions: Standard, fall   Orthopedic Precautions: RLE weight bearing as tolerated, LLE weight bearing as tolerated   Braces: N/A    Recommendations:     Discharge Recommendations:  home health OT  Level of Assistance Recommended at Discharge: Intermittent assistance for ADL's and homemaking tasks  Discharge Equipment Recommendations: bedside commode, bath bench, walker, rolling, hip kit  Barriers to discharge:  Decreased caregiver support    Assessment:     Mao Pearce is a 77 y.o. male with a medical diagnosis of Bilateral primary osteoarthritis of knee.  He presents with limitations in performance of self-care, functional mobility, and ADLs. Performance deficits affecting function are weakness, impaired endurance, impaired self care skills, impaired functional mobility, gait instability, impaired balance, decreased lower extremity function, decreased safety awareness, pain, decreased ROM, orthopedic precautions, impaired skin, decreased coordination. Pt tolerated Tx without incident and is making progress but continues to require assist to perform self care tasks, functional mobility and functional transfers. Pt would continue to benefit from OT intervention to further functional (I)ce and safety.     Rehab Potential is good    Activity tolerance:  Good    Plan:     Patient to be seen 5 x/week to address the above listed problems via self-care/home management, community/work re-entry, therapeutic activities, therapeutic exercises, neuromuscular re-education    Plan of Care Expires: 08/26/23  Plan of Care Reviewed with: patient    Subjective     Communicated with: Nursing prior to session.     Pain/Comfort:  Pain Rating 1: 3/10  Location - Side 1: Bilateral  Location - Orientation 1: generalized  Location 1: knee  Pain Addressed 1:  Pre-medicate for activity, Reposition, Distraction  Pain Rating Post-Intervention 1: 3/10    Patient's cultural, spiritual, Zoroastrianism conflicts given the current situation:  no    Objective:     Patient found HOB elevated with  (no lines) upon OT entry to room.    Bed Mobility:    Patient completed Scooting/Bridging with supervision and with side rail  Patient completed Supine to Sit with supervision and with side rail     Functional Mobility/Transfers:  Patient completed Sit <> Stand Transfer with stand by assistance  with  rolling walker   Patient completed Toilet Transfer Step Transfer technique with stand by assistance with  rolling walker to  front of toilet to void  Functional Mobility: Pt ambulated functional distances around room/bathroom with SBA and RW. Pt propelled W/C from room to therapy gym with sup for a total of 165 ft using BUE to propel chair.    Activities of Daily Living:  Grooming: supervision standing sinkside with set up and RW for oral care and to shave face  Bathing: stand by assistance standing sinkside with RW for UB sponge bath and to wash hair pent over sink   Upper Body Dressing: modified independence to doff/bridgett pullover shirt  Lower Body Dressing: stand by assistance to bridgett shorts seated EOB with bridging/weight shifting to manage over hips  Toileting: stand by assistance standing with RW in front of toilet to void    AMPAC 6 Click ADL: 21    OT Exercises:     Treatment & Education:  Pt tolerated standing/ambulating with SBA/Sup and RW for approximately 15 min when performing self care tasks.     Pt performed UBE exercise for 10 minutes with Mod resistance. Pt performed bilateral UE exercise with 6 lb dumbbells through functional ROM with rep of 10 x2 for each motion. UE exercises performed to increase functional endurance and strength in order increase independence when performing self care tasks, functional ambulation, W/C propulsion, and functional standing activities  .      Patient left up in chair with call button in reach    GOALS:   Multidisciplinary Problems       Occupational Therapy Goals          Problem: Occupational Therapy    Goal Priority Disciplines Outcome Interventions   Occupational Therapy Goal     OT, PT/OT Ongoing, Progressing    Description: Goals to be met by: 8/26/2023     Patient will increase functional independence with ADLs by performing:    LE Dressing with Modified [place] and AE as needed.   Toileting from toilet with Modified [place] for hygiene and clothing management and AE as needed.   Bathing from  shower chair/bench with Modified [place] and AE as needed.   Supine to sit with Modified [place] and AE as needed.  Step transfer with Modified [place] and LRAD as needed/  Toilet transfer to toilet with Modified [place] and LRAD as needed.   Tolerate standing for ~10 minutes /c LRAD  Perform functional tasks in standing for ~10 min /c LRAD                         Time Tracking:     OT Date of Treatment: 08/04/23  OT Start Time: 1004    OT Stop Time: 1048  OT Total Time (min): 44 min    Billable Minutes:Self Care/Home Management 24  Therapeutic Exercise 20    8/4/2023

## 2023-08-05 PROCEDURE — 97530 THERAPEUTIC ACTIVITIES: CPT

## 2023-08-05 PROCEDURE — 11000004 HC SNF PRIVATE

## 2023-08-05 PROCEDURE — 97116 GAIT TRAINING THERAPY: CPT

## 2023-08-05 PROCEDURE — 25000003 PHARM REV CODE 250: Performed by: NURSE PRACTITIONER

## 2023-08-05 PROCEDURE — 97110 THERAPEUTIC EXERCISES: CPT

## 2023-08-05 RX ADMIN — OXYCODONE HYDROCHLORIDE AND ACETAMINOPHEN 1 TABLET: 10; 325 TABLET ORAL at 09:08

## 2023-08-05 RX ADMIN — POLYETHYLENE GLYCOL 3350 17 G: 17 POWDER, FOR SOLUTION ORAL at 09:08

## 2023-08-05 RX ADMIN — SUCRALFATE 1 G: 1 SUSPENSION ORAL at 06:08

## 2023-08-05 RX ADMIN — OXYCODONE HYDROCHLORIDE AND ACETAMINOPHEN 1 TABLET: 10; 325 TABLET ORAL at 01:08

## 2023-08-05 RX ADMIN — SUCRALFATE 1 G: 1 SUSPENSION ORAL at 12:08

## 2023-08-05 RX ADMIN — SENNOSIDES AND DOCUSATE SODIUM 2 TABLET: 50; 8.6 TABLET ORAL at 09:08

## 2023-08-05 RX ADMIN — SUCRALFATE 1 G: 1 SUSPENSION ORAL at 05:08

## 2023-08-05 RX ADMIN — OXYCODONE HYDROCHLORIDE AND ACETAMINOPHEN 1 TABLET: 10; 325 TABLET ORAL at 03:08

## 2023-08-05 RX ADMIN — SUCRALFATE 1 G: 1 SUSPENSION ORAL at 09:08

## 2023-08-05 RX ADMIN — ASPIRIN 81 MG 81 MG: 81 TABLET ORAL at 09:08

## 2023-08-05 RX ADMIN — OXYCODONE HYDROCHLORIDE AND ACETAMINOPHEN 1 TABLET: 10; 325 TABLET ORAL at 06:08

## 2023-08-05 NOTE — PLAN OF CARE
Problem: Adult Inpatient Plan of Care  Goal: Plan of Care Review  Outcome: Ongoing, Progressing     Problem: Adult Inpatient Plan of Care  Goal: Patient-Specific Goal (Individualized)  Outcome: Ongoing, Progressing     Problem: Adult Inpatient Plan of Care  Goal: Absence of Hospital-Acquired Illness or Injury  Outcome: Ongoing, Progressing     Problem: Adult Inpatient Plan of Care  Goal: Readiness for Transition of Care  Outcome: Ongoing, Progressing     Problem: Skin Injury Risk Increased  Goal: Skin Health and Integrity  Outcome: Ongoing, Progressing     Problem: Fall Injury Risk  Goal: Absence of Fall and Fall-Related Injury  Outcome: Ongoing, Progressing

## 2023-08-05 NOTE — PLAN OF CARE
Problem: Adult Inpatient Plan of Care  Goal: Plan of Care Review  8/4/2023 2323 by Evangelina Hagan LPN  Outcome: Ongoing, Progressing  8/4/2023 2322 by Evangelina Hagan LPN  Outcome: Ongoing, Progressing     Problem: Adult Inpatient Plan of Care  Goal: Patient-Specific Goal (Individualized)  8/4/2023 2323 by Evangelina Hagan LPN  Outcome: Ongoing, Progressing  8/4/2023 2322 by Evangelina Hagan LPN  Outcome: Ongoing, Progressing     Problem: Adult Inpatient Plan of Care  Goal: Absence of Hospital-Acquired Illness or Injury  8/4/2023 2323 by Evangelina Hagan LPN  Outcome: Ongoing, Progressing  8/4/2023 2322 by Evangelina Hagan LPN  Outcome: Ongoing, Progressing     Problem: Adult Inpatient Plan of Care  Goal: Optimal Comfort and Wellbeing  8/4/2023 2323 by Evangelina Hagan LPN  Outcome: Ongoing, Progressing  8/4/2023 2322 by Evangelina Hagan LPN  Outcome: Ongoing, Progressing     Problem: Fall Injury Risk  Goal: Absence of Fall and Fall-Related Injury  8/4/2023 2323 by Evangelina Hagan LPN  Outcome: Ongoing, Progressing  8/4/2023 2322 by Evangelina Hagan LPN  Outcome: Ongoing, Progressing     Problem: Skin Injury Risk Increased  Goal: Skin Health and Integrity  8/4/2023 2323 by Evangelina Hagan LPN  Outcome: Ongoing, Progressing  8/4/2023 2322 by Evangelina Hagan LPN  Outcome: Ongoing, Progressing

## 2023-08-05 NOTE — PT/OT/SLP PROGRESS
Physical Therapy Treatment    Patient Name:  Mao Pearce   MRN:  [id number]  Admit Date: 7/27/2023  Admitting Diagnosis: Bilateral primary osteoarthritis of knee  Recent Surgeries: Procedure(s) (LRB): ARTHROPLASTY, KNEE, TOTAL REPLACEMENT (Bilateral)    General Precautions: Standard, fall  Orthopedic Precautions: RLE weight bearing as tolerated, LLE weight bearing as tolerated  Braces: N/A    Recommendations:     Discharge Recommendations: home health PT  Level of Assistance Recommended at Discharge: Intermittent assistance   Discharge Equipment Recommendations: bedside commode, bath bench, walker, rolling, hip kit  Barriers to discharge: Decreased caregiver support (increased assistance needed at this time)    Assessment:     Mao Pearce is a 77 y.o. male admitted with a medical diagnosis of Bilateral primary osteoarthritis of knee. Today's treatment included strengthening, range of motion, balance training, gait training, and cardiovascular endurance interventions. Progressed exercises by performing more standing exercises and continuing to increase gait distance each therapy session. Patient continues to remain limited in bilateral tibiofemoral mobility mostly limited by pain and decreased strength. Patient requires skilled physical therapy services to address deficits and return patient to PLOF with no limitations.     Performance deficits affecting function: weakness, impaired self care skills, impaired functional mobility, gait instability, impaired balance, impaired endurance, impaired sensation, decreased lower extremity function, pain, decreased safety awareness, decreased ROM, impaired skin, impaired cardiopulmonary response to activity, orthopedic precautions.    Rehab Potential is good    Activity Tolerance: Good    Plan:     Patient to be seen 5 x/week to address the above listed problems via gait training, therapeutic activities, therapeutic exercises, neuromuscular re-education, wheelchair  "management/training    Plan of Care Expires: 08/17/23  Plan of Care Reviewed with: patient    Subjective     He is doing good today with pain reports being 8/10 in his knees but is agreeable to participate in therapy.       Pain/Comfort:  Pain Rating 1: 8/10  Location - Side 1: Bilateral  Location - Orientation 1: generalized  Location 1: knee  Pain Addressed 1: Pre-medicate for activity, Reposition, Distraction, Nurse notified  Pain Rating Post-Intervention 1: 8/10    Patient's cultural, spiritual, Holiness conflicts given the current situation:  no    Objective:     Communicated with nursing staff prior to session. Patient found HOB elevated with  (no active lines) upon PT entry to room.     Functional Mobility:  Gait belt applied - Yes  Bed Mobility  Rolling Left: stand by assistance using bed rails  Rolling Right: stand by assistance using bed rails  Scooting: contact guard assistance  Supine to Sit: stand by assistance for LE management  Sit to Supine: stand by assistance for LE management    Transfers:  Sit to Stand x multiple trials: stand by assistance with rolling walker.  Bed to WC: contact guard assistance with rolling walker using Stand Pivot.  WC to bed: contact guard assistance with rolling walker using Stand Pivot.  WC to [place]: contact guard assistance with rolling walker using Stand Pivot.  [place] to WC: contact guard assistance with rolling walker using Stand Pivot.    Gait: Patient ambulated 15 (in room to bathroom) + 116 + 116 feet with rolling walker and stand by assistance. He demonstrated decreased mica, decreased step length, decreased stride length, decreased swing-to-stance ratio, decreased toe-to-floor clearance, decreased weight-shifting ability, forward trunk lean, and reciprocal gait pattern.    Curb Step: Pt ascended/descended 2 x 4" curb step with Rolling Walker with Contact Guard Assistance.    Wheelchair Propulsion: Pt propelled Standard wheelchair x 170 feet on Level tile with " Bilateral upper extremity with Stand-by Assistance.    Therapeutic Activities and Exercises:   Nu step: x10 minutes with seat on 12 and load on 5.   Seated exercises: Patient performed with bilateral lower extremities.  LAQ: 2x10 reps  Hamstring curls: Red band, 2x10 reps  Standing exercises: Patient performed with bilateral lower extremities and stand by assistance for balance.   Calf raises: 2x10 reps  Marches: 10 reps  Hip abduction: 10 reps  Standing hamstring curls: 10 reps  Patient donned shorts with stand by assistance.    Neuromuscular Exercises:  Balance:  Sitting: Performed sitting edge of bed and in wheelchair with bilateral upper extremities as needed. No loss of balance noted.  Standing: Performed with rolling walker and stand by assistance. No loss of balance noted. Patient performed self-care tasks including brushing teeth, washing his face, and combing his hair with set-up assistance.     Patient Education:   Patient was educated to call nurse if he wanted to get back into bed/need anything. He verbalized understanding.  Patient was educated on benefits of physical therapy on continued improvement in patient's quality of life.  Patient was provided with daily orientation and goals of this PT session.  All questions answered to satisfaction, within scope of PT practice.   Patient voiced no other concerns.  RN notified of PT therapy session.     AM-PAC 6 CLICK MOBILITY  19    Patient left HOB elevated with all lines intact, call button in reach, and XIOMARA Rincon notified of patient's pain.    GOALS:   Multidisciplinary Problems       Physical Therapy Goals          Problem: Physical Therapy    Goal Priority Disciplines Outcome Goal Variances Interventions   Physical Therapy Goal     PT, PT/OT Ongoing, Progressing     Description: Goals to be met by: 23     Patient will increase functional independence with mobility by performin. Supine to sit with [place].  2. Sit to supine with  [place].  3. Rolling to Left and Right with [place].  4. Sit to stand transfer with Modified [place].  5. Bed to chair transfer with Modified [place] using Rolling Walker.  6. Gait x 150 feet with Modified [place] using Rolling Walker.  7. Wheelchair propulsion x 150 feet with Modified [place] using bilateral upper extremities.                         Time Tracking:     PT Received On: 08/05/23  PT Start Time: 1100  PT Stop Time: 1150  PT Total Time (min): 50 min    Billable Minutes: Gait Training 15, Therapeutic Activity 15, and Therapeutic Exercise 20    Treatment Type: Treatment  PT/PTA: PT     Number of PTA visits since last PT visit: 0     08/05/2023

## 2023-08-06 LAB
BILIRUB UR QL STRIP: NEGATIVE
CLARITY UR REFRACT.AUTO: CLEAR
COLOR UR AUTO: YELLOW
GLUCOSE UR QL STRIP: NEGATIVE
HGB UR QL STRIP: NEGATIVE
KETONES UR QL STRIP: NEGATIVE
LEUKOCYTE ESTERASE UR QL STRIP: NEGATIVE
NITRITE UR QL STRIP: NEGATIVE
PH UR STRIP: 6 [PH] (ref 5–8)
PROT UR QL STRIP: NEGATIVE
SP GR UR STRIP: 1.01 (ref 1–1.03)
URN SPEC COLLECT METH UR: NORMAL

## 2023-08-06 PROCEDURE — 25000003 PHARM REV CODE 250: Performed by: HOSPITALIST

## 2023-08-06 PROCEDURE — 25000003 PHARM REV CODE 250: Performed by: NURSE PRACTITIONER

## 2023-08-06 PROCEDURE — 81003 URINALYSIS AUTO W/O SCOPE: CPT | Performed by: HOSPITALIST

## 2023-08-06 PROCEDURE — 11000004 HC SNF PRIVATE

## 2023-08-06 PROCEDURE — 63600175 PHARM REV CODE 636 W HCPCS: Performed by: HOSPITALIST

## 2023-08-06 RX ADMIN — SUCRALFATE 1 G: 1 SUSPENSION ORAL at 12:08

## 2023-08-06 RX ADMIN — SUCRALFATE 1 G: 1 SUSPENSION ORAL at 05:08

## 2023-08-06 RX ADMIN — OXYCODONE HYDROCHLORIDE AND ACETAMINOPHEN 1 TABLET: 10; 325 TABLET ORAL at 10:08

## 2023-08-06 RX ADMIN — OXYCODONE HYDROCHLORIDE AND ACETAMINOPHEN 1 TABLET: 10; 325 TABLET ORAL at 05:08

## 2023-08-06 RX ADMIN — FINASTERIDE 5 MG: 5 TABLET, FILM COATED ORAL at 10:08

## 2023-08-06 RX ADMIN — ASPIRIN 81 MG 81 MG: 81 TABLET ORAL at 10:08

## 2023-08-06 RX ADMIN — CEFTRIAXONE 1 G: 1 INJECTION, POWDER, FOR SOLUTION INTRAMUSCULAR; INTRAVENOUS at 10:08

## 2023-08-07 LAB
ANION GAP SERPL CALC-SCNC: 11 MMOL/L (ref 8–16)
BASOPHILS # BLD AUTO: 0.03 K/UL (ref 0–0.2)
BASOPHILS NFR BLD: 0.5 % (ref 0–1.9)
BUN SERPL-MCNC: 12 MG/DL (ref 8–23)
CALCIUM SERPL-MCNC: 9.2 MG/DL (ref 8.7–10.5)
CHLORIDE SERPL-SCNC: 103 MMOL/L (ref 95–110)
CO2 SERPL-SCNC: 23 MMOL/L (ref 23–29)
CREAT SERPL-MCNC: 0.7 MG/DL (ref 0.5–1.4)
DIFFERENTIAL METHOD: ABNORMAL
EOSINOPHIL # BLD AUTO: 0.1 K/UL (ref 0–0.5)
EOSINOPHIL NFR BLD: 1.7 % (ref 0–8)
ERYTHROCYTE [DISTWIDTH] IN BLOOD BY AUTOMATED COUNT: 12.3 % (ref 11.5–14.5)
EST. GFR  (NO RACE VARIABLE): >60 ML/MIN/1.73 M^2
GLUCOSE SERPL-MCNC: 104 MG/DL (ref 70–110)
HCT VFR BLD AUTO: 30.1 % (ref 40–54)
HGB BLD-MCNC: 10.2 G/DL (ref 14–18)
IMM GRANULOCYTES # BLD AUTO: 0.05 K/UL (ref 0–0.04)
IMM GRANULOCYTES NFR BLD AUTO: 0.9 % (ref 0–0.5)
LYMPHOCYTES # BLD AUTO: 1.4 K/UL (ref 1–4.8)
LYMPHOCYTES NFR BLD: 24 % (ref 18–48)
MAGNESIUM SERPL-MCNC: 2 MG/DL (ref 1.6–2.6)
MCH RBC QN AUTO: 30.9 PG (ref 27–31)
MCHC RBC AUTO-ENTMCNC: 33.9 G/DL (ref 32–36)
MCV RBC AUTO: 91 FL (ref 82–98)
MONOCYTES # BLD AUTO: 0.5 K/UL (ref 0.3–1)
MONOCYTES NFR BLD: 7.8 % (ref 4–15)
NEUTROPHILS # BLD AUTO: 3.7 K/UL (ref 1.8–7.7)
NEUTROPHILS NFR BLD: 65.1 % (ref 38–73)
NRBC BLD-RTO: 0 /100 WBC
PHOSPHATE SERPL-MCNC: 3.6 MG/DL (ref 2.7–4.5)
PLATELET # BLD AUTO: 220 K/UL (ref 150–450)
PMV BLD AUTO: 10.7 FL (ref 9.2–12.9)
POTASSIUM SERPL-SCNC: 4 MMOL/L (ref 3.5–5.1)
RBC # BLD AUTO: 3.3 M/UL (ref 4.6–6.2)
SODIUM SERPL-SCNC: 137 MMOL/L (ref 136–145)
WBC # BLD AUTO: 5.74 K/UL (ref 3.9–12.7)

## 2023-08-07 PROCEDURE — 25000003 PHARM REV CODE 250: Performed by: HOSPITALIST

## 2023-08-07 PROCEDURE — 97110 THERAPEUTIC EXERCISES: CPT | Mod: CQ

## 2023-08-07 PROCEDURE — 97530 THERAPEUTIC ACTIVITIES: CPT

## 2023-08-07 PROCEDURE — 97530 THERAPEUTIC ACTIVITIES: CPT | Mod: CQ

## 2023-08-07 PROCEDURE — 11000004 HC SNF PRIVATE

## 2023-08-07 PROCEDURE — 83735 ASSAY OF MAGNESIUM: CPT | Performed by: HOSPITALIST

## 2023-08-07 PROCEDURE — 97116 GAIT TRAINING THERAPY: CPT | Mod: CQ

## 2023-08-07 PROCEDURE — 25000003 PHARM REV CODE 250: Performed by: NURSE PRACTITIONER

## 2023-08-07 PROCEDURE — 80048 BASIC METABOLIC PNL TOTAL CA: CPT | Performed by: HOSPITALIST

## 2023-08-07 PROCEDURE — 36415 COLL VENOUS BLD VENIPUNCTURE: CPT | Performed by: HOSPITALIST

## 2023-08-07 PROCEDURE — 97535 SELF CARE MNGMENT TRAINING: CPT

## 2023-08-07 PROCEDURE — 85025 COMPLETE CBC W/AUTO DIFF WBC: CPT | Performed by: HOSPITALIST

## 2023-08-07 PROCEDURE — 84100 ASSAY OF PHOSPHORUS: CPT | Performed by: HOSPITALIST

## 2023-08-07 RX ORDER — MICONAZOLE NITRATE 2 %
POWDER (GRAM) TOPICAL 2 TIMES DAILY
Status: DISCONTINUED | OUTPATIENT
Start: 2023-08-07 | End: 2023-08-10

## 2023-08-07 RX ADMIN — ALUMINUM HYDROXIDE, MAGNESIUM HYDROXIDE, AND DIMETHICONE 30 ML: 400; 400; 40 SUSPENSION ORAL at 10:08

## 2023-08-07 RX ADMIN — SUCRALFATE 1 G: 1 SUSPENSION ORAL at 09:08

## 2023-08-07 RX ADMIN — MICONAZOLE NITRATE 2 % TOPICAL POWDER: at 04:08

## 2023-08-07 RX ADMIN — ASPIRIN 81 MG 81 MG: 81 TABLET ORAL at 09:08

## 2023-08-07 RX ADMIN — OXYCODONE HYDROCHLORIDE AND ACETAMINOPHEN 1 TABLET: 10; 325 TABLET ORAL at 02:08

## 2023-08-07 RX ADMIN — SUCRALFATE 1 G: 1 SUSPENSION ORAL at 05:08

## 2023-08-07 RX ADMIN — ASPIRIN 81 MG 81 MG: 81 TABLET ORAL at 08:08

## 2023-08-07 RX ADMIN — ACETAMINOPHEN 650 MG: 325 TABLET ORAL at 02:08

## 2023-08-07 RX ADMIN — FINASTERIDE 5 MG: 5 TABLET, FILM COATED ORAL at 05:08

## 2023-08-07 RX ADMIN — SUCRALFATE 1 G: 1 SUSPENSION ORAL at 12:08

## 2023-08-07 RX ADMIN — OXYCODONE HYDROCHLORIDE AND ACETAMINOPHEN 1 TABLET: 10; 325 TABLET ORAL at 01:08

## 2023-08-07 RX ADMIN — ATORVASTATIN CALCIUM 40 MG: 40 TABLET, FILM COATED ORAL at 09:08

## 2023-08-07 NOTE — CARE UPDATE
Contacted on call team 8/6/23 after assessing patient based on primary nurse concerns of changed LOC significant, patient was also c/o of urine burning & frequency. UA ordered & started on daily IV Rocephin. Patient refused to take any other medications except pain medications, he is concerned with his abdomen size but non-distended and soft at this time with multiple bowel movements.

## 2023-08-07 NOTE — PLAN OF CARE
CHW served per facility [facility] to patient. Patient signed, a copy was given and faxed to [facility].

## 2023-08-07 NOTE — NURSING
Patient with C/O burning during urination and irritation of penis. Urine dark yellow, UA ordered and sent to lab. Pt started on IV Rocephin.

## 2023-08-07 NOTE — PLAN OF CARE
Problem: Occupational Therapy  Goal: Occupational Therapy Goal  Description: Goals to be met by: 8/26/2023     Patient will increase functional independence with ADLs by performing:    LE Dressing with Modified [place] and AE as needed- Partially met- Set-up  Toileting from toilet with Modified [place] for hygiene and clothing management and AE as needed.   Bathing from  shower chair/bench with Modified [place] and AE as needed.   Supine to sit with Modified [place] and AE as needed - Met  Step transfer with Modified [place] and LRAD as needed  Toilet transfer to toilet with Modified [place] and LRAD as needed.   Tolerate standing for ~10 minutes /c LRAD- Met  Perform functional tasks in standing for ~10 min /c LRAD- Met    Outcome: Ongoing, Progressing  Goals reassessed and edited to reflect current level of function.

## 2023-08-07 NOTE — PT/OT/SLP PROGRESS
"Physical Therapy Treatment    Patient Name:  Mao Pearce   MRN:  [id number]  Admit Date: 7/27/2023  Admitting Diagnosis: Bilateral primary osteoarthritis of knee  Recent Surgeries:     General Precautions: Standard, fall  Orthopedic Precautions: RLE weight bearing as tolerated, LLE weight bearing as tolerated  Braces: N/A    Recommendations:     Discharge Recommendations: home health PT  Level of Assistance Recommended at Discharge: Intermittent assistance   Discharge Equipment Recommendations: bedside commode, bath bench, hip kit, walker, rolling  Barriers to discharge: Decreased caregiver support    Assessment:     Mao Pearce is a 77 y.o. male admitted with a medical diagnosis of Bilateral primary osteoarthritis of knee . Patient showed good participation despite c/o pain on knees and burning sensation on his penis when peeing. Patient presents with decreased B knee flexion which limits proper transfer mechanics from sit<>stand.      Performance deficits affecting function: weakness, impaired endurance, impaired self care skills, impaired functional mobility, gait instability, impaired balance, decreased coordination, pain, decreased ROM, impaired skin, orthopedic precautions.    Rehab Potential is good    Activity Tolerance: Good    Plan:     Patient to be seen 5 x/week to address the above listed problems via gait training, therapeutic activities, therapeutic exercises, neuromuscular re-education, wheelchair management/training    Plan of Care Expires: 08/17/23  Plan of Care Reviewed with: patient    Subjective     Patient states " yesterday I almost call 911, because I asked the nurse to give me some antibiotics because my penis was burning when I pee".     Pain/Comfort:  Pain Rating 1: 5/10  Location - Side 1: Bilateral  Location - Orientation 1: generalized  Location 1: knee  Pain Addressed 1: Reposition, Distraction  Pain Rating Post-Intervention 1:  (Did not rate)    Patient's cultural, " spiritual, Voodoo conflicts given the current situation:  no    Objective:     Communicated with NSG prior to session.  Patient found up in chair with   upon PT entry to room.     Therapeutic Activities and Exercises: Transferred from bed<>wheelchair with RW and CGA. LE Ergometer with x 12 minutes. There ex: LAQ AND QUAD SETS 2X10 reps B LE.    Functional Mobility:  Bed Mobility:     Scooting: modified independence  Sit to Supine: modified independence  Transfers:     Sit to Stand:  contact guard assistance with rolling walker  Bed to Chair: contact guard assistance with  rolling walker  using  Stand Pivot  Gait: 242 ft with RW and CGA to SBA, with B LE WBAT and cues to correct fwd/flexed posture.  Wheelchair Propulsion:  Pt propelled Standard wheelchair x 120 feet on Level tile with  Right upper extremity and Left upper extremity with Supervision or Set-up Assistance.     AM-PAC 6 CLICK MOBILITY  20    Patient left HOB elevated with call button in reach.    GOALS:   Multidisciplinary Problems       Physical Therapy Goals          Problem: Physical Therapy    Goal Priority Disciplines Outcome Goal Variances Interventions   Physical Therapy Goal     PT, PT/OT Ongoing, Progressing     Description: Goals to be met by: 23     Patient will increase functional independence with mobility by performin. Supine to sit with [place].  2. Sit to supine with [place].  3. Rolling to Left and Right with [place].  4. Sit to stand transfer with Modified [place].  5. Bed to chair transfer with Modified [place] using Rolling Walker.  6. Gait x 150 feet with Modified [place] using Rolling Walker.  7. Wheelchair propulsion x 150 feet with Modified [place] using bilateral upper extremities.                         Time Tracking:     PT Received On: 23  PT Start Time: 1117  PT Stop Time: 1206  PT Total Time (min): 49 min    Billable Minutes: Gait Training 20, Therapeutic Activity 9,  and Therapeutic Exercise 20    Treatment Type: Treatment  PT/PTA: PTA     Number of PTA visits since last PT visit: 1 08/07/2023

## 2023-08-07 NOTE — PT/OT/SLP PROGRESS
Occupational Therapy   Treatment    Name: Mao Pearce  MRN: [id number]  Admit Date: 7/27/2023  Admitting Diagnosis:  Bilateral primary osteoarthritis of knee    General Precautions: Standard, fall   Orthopedic Precautions: RLE weight bearing as tolerated, LLE weight bearing as tolerated   Braces: N/A    Recommendations:     Discharge Recommendations:  home health OT  Level of Assistance Recommended at Discharge: Intermittent supervision  Discharge Equipment Recommendations: bedside commode, bath bench, walker, rolling, hip kit  Barriers to discharge:  Decreased caregiver support    Assessment:     Mao Pearce is a 77 y.o. male with a medical diagnosis of Bilateral primary osteoarthritis of knee . Pt tolerated session well and without incident and shows excellent motivation and potential to improve, but the pt continues to require assistance to perform self-care tasks and mobility. Pt strengths include Functional cognition, Following multi-step tasks, and Attention to task and PLOF, Motivation, and Willingness to participate. Pt improved LBD, Grooming/hygiene, Toilet transfers, Dynamic standing balance, Standing tolerance, and Tolerance for sitting at EOB. However, pt would continue to benefit from cont'd OT services in the SNF setting to improve safety and independence /c functional tasks and ADLs upon discharge.  Performance deficits  affecting function are weakness, impaired endurance, impaired self care skills, impaired functional mobility, gait instability, impaired balance, decreased lower extremity function, decreased safety awareness, pain, decreased ROM, orthopedic precautions, impaired skin, decreased coordination.     Rehab Potential is excellent    Activity tolerance:  Good    Plan:     Patient to be seen 5 x/week to address the above listed problems via self-care/home management, community/work re-entry, therapeutic activities, therapeutic exercises, neuromuscular re-education    Plan of Care  "Expires: 08/26/23  Plan of Care Reviewed with: patient    Subjective     Communicated with: NSG prior to session.     "I feel like I could get out of here sooner." Pt education on IDT meeting in PM on day of tx to discuss possible earlier d/c.    Pain/Comfort:  Pain Rating 1: 0/10 (did not rate when asked- "I have so many other things going on, I don't know!")  Pain Rating Post-Intervention 1: 0/10    Patient's cultural, spiritual, Denominational conflicts given the current situation:  no    Objective:     Patient found supine with  (no active lines) upon OT entry to room. Pt alert and agreeable to OT tx.    Bed Mobility:    Patient completed Sit to Supine with independence     Functional Mobility/Transfers:  Patient completed Sit <> Stand Transfer with supervision  with  rolling walker   Patient completed Toilet Transfer Step Transfer technique with supervision with  4 wheeled walker and raised toilet seat. Pt educated on use of raised toilet seat to increase I /c toilet transfers at home.  Functional Mobility: Pt able to ambulate around room to perform ADL tasks /c SPV and RW.   Pt able to tolerate standing ADLS for ~11 minutes.   Pt able to propel WC (I) from room>gym (~ 155 ft)      Activities of Daily Living:  Grooming: supervision in standing /c RW for support to brush teeth and shave. Pt /c no noted LOB.   Upper Body Dressing: independence   Lower Body Dressing: supervision for retrieval; Pt able to perform clothing mgmt without seated at EOB without assist      [facility] 6 Click ADL: 22    OT Exercises:   ~ Pt performed 2 trials standing balance task to improve standing tolerance, dynamic standing balance, functional cognition, and functional reach. Pt asked to bounce ball /c 3rd party while taking turns recalling words in a designated pattern. 2nd trial graded to increase difficulty maintaining balance and difficulty of pattern increased cognitive demand. Pt able to perform both trials /c SBA and intermittent use of " RW for support.       Treatment & Education:  Pt educated on POC, role of OT, adaptive equipment, importance of functional cognition tasks to improve performance of ADLs, safety, and importance of rest breaks. Pt performed tasks to improve safety and independence in functional tasks and ADLs as mentioned above.       Patient left up in chair with  tech Leena present and all needs met.    GOALS:   Multidisciplinary Problems       Occupational Therapy Goals          Problem: Occupational Therapy    Goal Priority Disciplines Outcome Interventions   Occupational Therapy Goal     OT, PT/OT Ongoing, Progressing    Description: Goals to be met by: 8/26/2023     Patient will increase functional independence with ADLs by performing:    LE Dressing with Modified [place] and AE as needed- Partially met- Set-up  Toileting from toilet with Modified [place] for hygiene and clothing management and AE as needed.   Bathing from  shower chair/bench with Modified [place] and AE as needed.   Supine to sit with Modified [place] and AE as needed - Met  Step transfer with Modified [place] and LRAD as needed  Toilet transfer to toilet with Modified [place] and LRAD as needed.   Tolerate standing for ~10 minutes /c LRAD- Met  Perform functional tasks in standing for ~10 min /c LRAD- Met                         Time Tracking:     OT Date of Treatment: 08/07/23  OT Start Time: 0756    OT Stop Time: 0836  OT Total Time (min): 40 min    Billable Minutes:Self Care/Home Management 10  Therapeutic Activity 30    8/7/2023

## 2023-08-07 NOTE — PROGRESS NOTES
[facility]                                  Skilled Nursing Facility                   Progress Note     Admit Date: 7/27/2023  FARHAT 8/17/2023  Principal Problem:  Bilateral primary osteoarthritis of knee   HPI obtained from patient interview and chart review     Chief Complaint:Re-evaluation of medical treatment and therapy status: Lab review    HPI:   Mr. Pearce is a 74-year-old male with PMHx of HLD, BPH, osteoarthritis who presents to SNF following hospitalization for bilateral primary osteoarthritis s/p bilateral total knee arthroplasty on 07/25 with Dr. Cox.  For secondary weakness and debility.    Interval history:   All of today's labs reviewed and are listed below.  24 hr vital sign ranges listed below. per nursing report, patient with significant change in mental status overnight.  UA completed and is negative for infectious process.  No leukocytosis or febrile episodes.  Patient's mentation is fully back to baseline which is oriented x3 during my assessment.  Patient states he is having dysuria and testicular discomfort.  He states that he feels like it is all on fire.  Observed his penis and testicles, there is no redness no irritation nothing abnormal observed.  Patient denies shortness of breath, abdominal discomfort, nausea, or vomiting.  Patient reports an adequate appetite.  Patient denies dysuria.  Patient reports having regular bowel movements.  Patient progressing with PT/OT- Gait: Patient ambulated 15 (in room to bathroom) + 116 + 116 feet with rolling walker and stand by assistance. He demonstrated decreased mica, decreased step length, decreased stride length, decreased swing-to-stance ratio, decreased toe-to-floor clearance, decreased weight-shifting ability, forward trunk lean, and reciprocal gait pattern. Continuing to follow and treat all acute and chronic conditions.      Past Medical History: Patient has a  past medical history of BPH (benign prostatic hyperplasia), Hair loss, Hyperlipidemia, and Osteoarthritis.    Past Surgical History: Patient has a past surgical history that includes Knee arthroscopy; Tonsillectomy; Hip replacement arthroplasty (Left, 5/7/2020); and Total knee arthroplasty (Bilateral, 7/25/2023).    Social History: Patient reports that he quit smoking about 37 years ago. His smoking use included cigarettes. He has never used smokeless tobacco. He reports current alcohol use. He reports that he does not currently use drugs.    Family History: family history includes No Known Problems in his father and mother. He was adopted.    Allergies: Patient is allergic to ciprofloxacin and metronidazole.    ROS  Constitutional: Negative for fever   Eyes: Negative for blurred vision, double vision   Respiratory: Negative for cough, shortness of breath   Cardiovascular: Negative for chest pain, palpitations, and leg swelling.   Gastrointestinal: Negative for abdominal pain, diarrhea, nausea, vomiting, constipation, indigestion  Genitourinary: Negative for dysuria, frequency.  + perineal area discomfort  Musculoskeletal:  + generalized weakness. Negative for back pain and myalgias.   Skin: Negative for itching and rash.   Neurological: Negative for dizziness, headaches.   Psychiatric/Behavioral: Negative for depression. The patient is not nervous/anxious.      24 hour Vital Sign Range   Temp:  [98.2 °F (36.8 °C)]   Pulse:  [76]   Resp:  [18]   BP: (158)/(78)   SpO2:  [99 %]     Current BMI: Body mass index is 28.88 kg/m².    PEx  Constitutional: Patient appears debilitated.  No distress noted  HENT:   Head: Normocephalic and atraumatic.   Eyes: Pupils are equal, round  Neck: Normal range of motion. Neck supple.   Cardiovascular: Normal rate, regular rhythm and normal heart sounds.    Pulmonary/Chest: Effort normal and breath sounds are clear  Abdominal: Soft. Bowel sounds are normal.   Musculoskeletal: Normal  "range of motion.   Neurological: Alert and oriented to person, place, and time.   Psychiatric: Normal mood and affect. Behavior is normal.   Skin: Skin is warm and dry. Surgical dressing to bilateral knees    Recent Labs   Lab 08/07/23  0524      K 4.0      CO2 23   BUN 12   CREATININE 0.7   CALCIUM 9.2   MG 2.0           Recent Labs   Lab 08/07/23  0524   WBC 5.74   RBC 3.30*   HGB 10.2*   HCT 30.1*      MCV 91   MCH 30.9   MCHC 33.9           No results for input(s): "POCTGLUCOSE" in the last 168 hours.     Assessment and Plan:    AMS  - per nursing report, patient with significant change in mental status overnight.  UA completed and is negative for infectious process.  No leukocytosis or febrile episodes.  Discontinuing IV ceftriaxone  - delirium precautions  - possibly experiencing hospital delirium, asked for therapy to be completed outside today if safe  - resolved at the time of my assessment    Perineal area discomfort  - instructed patient to bathe perineal area today, initiated miconazole 2% BID    Bilateral knee osteoarthritis  S/p bilateral TKA on 07/26  - PT/OT, WBAT  - DVT PPX with ASA 81 mg BID  - dressing to be changed on postop day 3 and every 3 days afterwards or PRN for saturation  - f/u with [facility] on 8/16 1030AM , [place] location    Acute postoperative pain  - improved, patient requesting to be transition to Percocet, oxycodone 15 discontinued, 1000 mg q.8 hours. DC'ed methocarbamol 500 mg QID    Constipation  - 8/1 ordered brown bottom enema today.  received multiple enemas, lactulose q.6 hours PRN , suppository on 7/31, continue senna docusate and daily MiraLax.   - successful bowel movement on 8/1  - resolved, 2 BMs on 8/5 and 8/6    GERD  - improving, continue sucralfate 100 mg AC/HS along with simethicone 90 mg TID PC x3 days, DC Protonix 40 mg daily, continue PRN tums and mylamta    Generalized anxiety  - stable, continue alprazolam 1 mg qHS. " PRN    BPH  - stable, continue finasteride 5 mg daily     HLD  - continue atorvastatin 40 mg qHS    Debility   - Continue with PT/OT for gait training and strengthening and restoration of ADL's   - Encourage mobility, OOB in chair, and early ambulation as appropriate  - Fall precautions   - Monitor for bowel and bladder dysfunction  - Monitor for and prevent skin breakdown and pressure ulcers  - Continue DVT prophylaxis with ASA 81 mg BID    Anticipate disposition:  Home with home health      Follow-up needed during SNF stay-f/u with [facility] on 8/16 1030AM , [place] location- will not show up in EPIC    Follow-up needed after discharge from SNF: PCP, [facility]    No future appointments.        Jenifer Jesus NP  Department of Hospital Medicine   [facility]     DOS: 8/7/2023       Patient note was created using MModal Dictation.  Any errors in syntax or even information may not have been identified and edited on initial review prior to signing this note.

## 2023-08-07 NOTE — PLAN OF CARE
Interdisciplinary team, Beatris Brown, RN Unit Director, Yesi Duvall, Activities, Josselin Post, PT and Tiki Dean, Dietician, spoke to patient for care plan conference, weekly status update, and therapy progress update. Tentative discharge date set for 8/10/23.

## 2023-08-08 LAB
BILIRUB UR QL STRIP: NEGATIVE
CLARITY UR REFRACT.AUTO: CLEAR
COLOR UR AUTO: YELLOW
COMPLEXED PSA SERPL-MCNC: 0.91 NG/ML (ref 0–4)
GLUCOSE UR QL STRIP: NEGATIVE
HGB UR QL STRIP: NEGATIVE
KETONES UR QL STRIP: NEGATIVE
LEUKOCYTE ESTERASE UR QL STRIP: NEGATIVE
NITRITE UR QL STRIP: NEGATIVE
PH UR STRIP: 6 [PH] (ref 5–8)
PROT UR QL STRIP: NEGATIVE
SP GR UR STRIP: 1.01 (ref 1–1.03)
URN SPEC COLLECT METH UR: NORMAL

## 2023-08-08 PROCEDURE — 97110 THERAPEUTIC EXERCISES: CPT

## 2023-08-08 PROCEDURE — 97110 THERAPEUTIC EXERCISES: CPT | Mod: CO

## 2023-08-08 PROCEDURE — 97530 THERAPEUTIC ACTIVITIES: CPT | Mod: CO

## 2023-08-08 PROCEDURE — 36415 COLL VENOUS BLD VENIPUNCTURE: CPT | Performed by: NURSE PRACTITIONER

## 2023-08-08 PROCEDURE — 25000003 PHARM REV CODE 250: Performed by: NURSE PRACTITIONER

## 2023-08-08 PROCEDURE — 25000003 PHARM REV CODE 250: Performed by: HOSPITALIST

## 2023-08-08 PROCEDURE — 11000004 HC SNF PRIVATE

## 2023-08-08 PROCEDURE — 84153 ASSAY OF PSA TOTAL: CPT | Performed by: NURSE PRACTITIONER

## 2023-08-08 PROCEDURE — 81003 URINALYSIS AUTO W/O SCOPE: CPT | Performed by: NURSE PRACTITIONER

## 2023-08-08 PROCEDURE — 97112 NEUROMUSCULAR REEDUCATION: CPT

## 2023-08-08 PROCEDURE — 97116 GAIT TRAINING THERAPY: CPT

## 2023-08-08 RX ORDER — TAMSULOSIN HYDROCHLORIDE 0.4 MG/1
0.4 CAPSULE ORAL NIGHTLY
Status: DISCONTINUED | OUTPATIENT
Start: 2023-08-08 | End: 2023-08-11 | Stop reason: HOSPADM

## 2023-08-08 RX ORDER — NAPROXEN SODIUM 220 MG/1
81 TABLET, FILM COATED ORAL 2 TIMES DAILY
Qty: 32 TABLET | Refills: 0 | COMMUNITY
Start: 2023-08-10 | End: 2024-01-16

## 2023-08-08 RX ORDER — AMOXICILLIN 250 MG
2 CAPSULE ORAL 2 TIMES DAILY
Qty: 60 TABLET | Refills: 1 | Status: SHIPPED | OUTPATIENT
Start: 2023-08-08 | End: 2023-09-26

## 2023-08-08 RX ORDER — ACETAMINOPHEN 325 MG/1
650 TABLET ORAL EVERY 6 HOURS PRN
Refills: 0 | COMMUNITY
Start: 2023-08-08

## 2023-08-08 RX ORDER — POLYETHYLENE GLYCOL 3350 17 G/17G
17 POWDER, FOR SOLUTION ORAL DAILY
Refills: 0 | COMMUNITY
Start: 2023-08-09 | End: 2023-09-26

## 2023-08-08 RX ORDER — AMOXICILLIN 250 MG
2 CAPSULE ORAL 2 TIMES DAILY PRN
Status: DISCONTINUED | OUTPATIENT
Start: 2023-08-08 | End: 2023-08-11 | Stop reason: HOSPADM

## 2023-08-08 RX ORDER — POLYETHYLENE GLYCOL 3350 17 G/17G
17 POWDER, FOR SOLUTION ORAL 2 TIMES DAILY PRN
Status: DISCONTINUED | OUTPATIENT
Start: 2023-08-08 | End: 2023-08-11 | Stop reason: HOSPADM

## 2023-08-08 RX ORDER — OXYCODONE AND ACETAMINOPHEN 10; 325 MG/1; MG/1
1 TABLET ORAL EVERY 4 HOURS PRN
Qty: 30 TABLET | Refills: 0 | Status: SHIPPED | OUTPATIENT
Start: 2023-08-08 | End: 2023-08-11 | Stop reason: HOSPADM

## 2023-08-08 RX ADMIN — TAMSULOSIN HYDROCHLORIDE 0.4 MG: 0.4 CAPSULE ORAL at 08:08

## 2023-08-08 RX ADMIN — OXYCODONE HYDROCHLORIDE AND ACETAMINOPHEN 1 TABLET: 10; 325 TABLET ORAL at 06:08

## 2023-08-08 RX ADMIN — SUCRALFATE 1 G: 1 SUSPENSION ORAL at 08:08

## 2023-08-08 RX ADMIN — MICONAZOLE NITRATE 2 % TOPICAL POWDER: at 08:08

## 2023-08-08 RX ADMIN — FINASTERIDE 5 MG: 5 TABLET, FILM COATED ORAL at 08:08

## 2023-08-08 RX ADMIN — OXYCODONE HYDROCHLORIDE AND ACETAMINOPHEN 1 TABLET: 10; 325 TABLET ORAL at 02:08

## 2023-08-08 RX ADMIN — SUCRALFATE 1 G: 1 SUSPENSION ORAL at 06:08

## 2023-08-08 RX ADMIN — OXYCODONE HYDROCHLORIDE AND ACETAMINOPHEN 1 TABLET: 10; 325 TABLET ORAL at 10:08

## 2023-08-08 RX ADMIN — ALUMINUM HYDROXIDE, MAGNESIUM HYDROXIDE, AND DIMETHICONE 30 ML: 400; 400; 40 SUSPENSION ORAL at 09:08

## 2023-08-08 RX ADMIN — ASPIRIN 81 MG 81 MG: 81 TABLET ORAL at 08:08

## 2023-08-08 RX ADMIN — SUCRALFATE 1 G: 1 SUSPENSION ORAL at 12:08

## 2023-08-08 NOTE — PT/OT/SLP PROGRESS
"Physical Therapy Treatment    Patient Name:  Mao Pearce   MRN:  [id number]  Admit Date: 7/27/2023  Admitting Diagnosis: Bilateral primary osteoarthritis of knee  Recent Surgeries: ARTHROPLASTY, KNEE, TOTAL REPLACEMENT (Bilateral)    General Precautions: Standard, fall  Orthopedic Precautions: LLE weight bearing as tolerated, RLE weight bearing as tolerated  Braces: N/A    Recommendations:     Discharge Recommendations: home health PT  Level of Assistance Recommended at Discharge: Intermittent assistance   Discharge Equipment Recommendations: bedside commode, bath bench, hip kit, walker, rolling  Barriers to discharge: Decreased caregiver support    Assessment:     Mao Pearce is a 77 y.o. male admitted with a medical diagnosis of Bilateral primary osteoarthritis of knee .     Pt agreeable to therapy, concerned about potential pain due to having his staples taken out today.  Pt SUP for bed mobility, SBA for STS and transfer to W/C with RW via step transfer. Pt ambulates with RW and SBA, balance training at [employer] [place] with SBA to [facility], therex performed at cross-. Continue to maximize gait with focus on restoration of normal gait patterns to facilitate return to PLOF.    Performance deficits affecting function: weakness, impaired balance, impaired endurance, impaired self care skills, decreased safety awareness, impaired functional mobility, gait instability, decreased lower extremity function, decreased ROM.    Rehab Potential is good    Activity Tolerance: Excellent    Plan:     Patient to be seen 5 x/week to address the above listed problems via gait training, therapeutic activities, therapeutic exercises, neuromuscular re-education    Plan of Care Expires: 08/17/23  Plan of Care Reviewed with: patient    Subjective     "They literally just took my stitches out."     Pain/Comfort:  Pain Rating 1:  ("pretty bad")  Location - Side 1: Bilateral  Location - Orientation 1: generalized  Location 1: " knee  Pain Addressed 1: Reposition, Distraction, Pre-medicate for activity    Patient's cultural, spiritual, Restorationist conflicts given the current situation:  no    Objective:     Communicated with RN prior to session.  Patient found HOB elevated with  (no active lines) upon PT entry to room.     Therapeutic Activities and Exercises:   10 min at putting green hitting putts for dynamic balance training  Hit 5 in a row at one point  SBA to CGA for safety  10 min at cross- at level 4    Functional Mobility:  Bed Mobility:     Supine to Sit: supervision  Transfers:     Sit to Stand:  stand by assistance with rolling walker  Bed to Chair: stand by assistance with  rolling walker  using  Step Transfer  Chair to/from cross-: stand by assistance with  rolling walker  using  Step Transfer  Gait: 330ft w/ RW and SBA  Balance: SBA to CGA throughout    AM-PAC 6 CLICK MOBILITY  20    Patient left ambulatory in gym with  OT .    GOALS:   Multidisciplinary Problems       Physical Therapy Goals          Problem: Physical Therapy    Goal Priority Disciplines Outcome Goal Variances Interventions   Physical Therapy Goal     PT, PT/OT Ongoing, Progressing     Description: Goals to be met by: 23     Patient will increase functional independence with mobility by performin. Supine to sit with [place].  2. Sit to supine with [place]er.  3. Rolling to Left and Right with [place]er.  4. Sit to stand transfer with Modified [place].  5. Bed to chair transfer with Modified [place] using Rolling Walker.  6. Gait x 150 feet with Modified [place] using Rolling Walker.  7. Wheelchair propulsion x 150 feet with Modified [place] using bilateral upper extremities.                         Time Tracking:     PT Received On: 23  PT Start Time: 1313  PT Stop Time: 1351  PT Total Time (min): 38 min    Billable Minutes: Gait Training 15 min, Therapeutic Exercise 12 min, and Neuromuscular  Re-education 11 min    Treatment Type: Treatment  PT/PTA: PT     Number of PTA visits since last PT visit: 0     08/08/2023

## 2023-08-08 NOTE — NURSING
38 staples removed for left knee, minimal bleeding, patient tolerated well, steri-strips applied to site. 38 staples removed from Right knee, minimal bleeding, patient tolerated well, steri-trips applied to site.

## 2023-08-08 NOTE — PROGRESS NOTES
[facility]                                  Skilled Nursing Facility                   Progress Note     Admit Date: 7/27/2023  FARHAT 8/10/2023  Principal Problem:  Bilateral primary osteoarthritis of knee   HPI obtained from patient interview and chart review     Chief Complaint:Re-evaluation of medical treatment and therapy status:  Re- eval perineal discomfort    HPI:   Mr. Pearce is a 74-year-old male with PMHx of HLD, BPH, osteoarthritis who presents to SNF following hospitalization for bilateral primary osteoarthritis s/p bilateral total knee arthroplasty on 07/25 with Dr. Cox.  For secondary weakness and debility.    Interval history:   24 hr vital sign ranges listed below.  No further episodes of confusion.  Patient states he is still having perineal/dysuria symptoms.  Patient has a very difficult time explaining his symptoms.  He states that when he is urinating he is having hesitancy symptoms which leads to pain and discomfort that extends through the penile shaft through his testicles..  We will obtain a urine sample in ensure no UTI once more.  Will also place patient on Flomax in case he is having prostate issues.  Will obtain appointment with Urology if necessary.  Due to the above symptoms, patient is requesting to stay at SNF until his original discharge date of 08/17.  Surgical staples removed today with some difficulty.  Patient denies shortness of breath, abdominal discomfort, nausea, or vomiting.  Patient reports an adequate appetite.  Patient denies dysuria.  Patient reports having regular bowel movements.  Patient progressing with PT/OT-Gait: 242 ft with RW and CGA to SBA, with B LE WBAT and cues to correct fwd/flexed posture. Continuing to follow and treat all acute and chronic conditions.    Past Medical History: Patient has a past medical history of BPH (benign prostatic hyperplasia), Hair loss, Hyperlipidemia, and  Osteoarthritis.    Past Surgical History: Patient has a past surgical history that includes Knee arthroscopy; Tonsillectomy; Hip replacement arthroplasty (Left, 5/7/2020); and Total knee arthroplasty (Bilateral, 7/25/2023).    Social History: Patient reports that he quit smoking about 37 years ago. His smoking use included cigarettes. He has never used smokeless tobacco. He reports current alcohol use. He reports that he does not currently use drugs.    Family History: family history includes No Known Problems in his father and mother. He was adopted.    Allergies: Patient is allergic to ciprofloxacin and metronidazole.    ROS  Constitutional: Negative for fever   Eyes: Negative for blurred vision, double vision   Respiratory: Negative for cough, shortness of breath   Cardiovascular: Negative for chest pain, palpitations, and leg swelling.   Gastrointestinal: Negative for abdominal pain, diarrhea, nausea, vomiting, constipation, indigestion  Genitourinary: Negative for dysuria, frequency.   Musculoskeletal:  + generalized weakness. Negative for back pain and myalgias.   Skin: Negative for itching and rash.   Neurological: Negative for dizziness, headaches.   Psychiatric/Behavioral: Negative for depression. The patient is not nervous/anxious.      24 hour Vital Sign Range   Temp:  [97.7 °F (36.5 °C)-97.9 °F (36.6 °C)]   Pulse:  [75-81]   Resp:  [16-18]   BP: (112-114)/(71-82)   SpO2:  [96 %-97 %]     Current BMI: Body mass index is 28.88 kg/m².    PEx  Constitutional: Patient appears debilitated.  No distress noted  HENT:   Head: Normocephalic and atraumatic.   Eyes: Pupils are equal, round  Neck: Normal range of motion. Neck supple.   Cardiovascular: Normal rate, regular rhythm and normal heart sounds.    Pulmonary/Chest: Effort normal and breath sounds are clear  Abdominal: Soft. Bowel sounds are normal.   Musculoskeletal: Normal range of motion.   Neurological: Alert and oriented to person, place, and time.  "  Psychiatric: Normal mood and affect. Behavior is normal.   Skin: Skin is warm and dry.  Bilateral surgical incisions to knees, staples removed today, incisions in approximated Steri-Strips placed    No results for input(s): "GLUCOSE", "NA", "K", "CL", "CO2", "BUN", "CREATININE", "CALCIUM", "MG" in the last 24 hours.          No results for input(s): "WBC", "RBC", "HGB", "HCT", "PLT", "MCV", "MCH", "MCHC" in the last 24 hours.          No results for input(s): "POCTGLUCOSE" in the last 168 hours.     Assessment and Plan:      Perineal area discomfort  - persisting, obtaining UA with reflex for repeat studies, initiating Flomax 0.4 mg qHS., continue miconazole 2% BID    Bilateral knee osteoarthritis  S/p bilateral TKA on 07/26  - PT/OT, WBAT  - DVT PPX with ASA 81 mg BID  - dressing to be changed on postop day 3 and every 3 days afterwards or PRN for saturation  - f/u with [facility] on 8/16 1030AM , [place] location    Acute postoperative pain  - improved, continue Percocet 10/325 1000 mg q.8 hours. DC'ed methocarbamol 500 mg QID    Constipation  - 8/1 ordered brown bottom enema today.  received multiple enemas, lactulose q.6 hours PRN , suppository on 7/31, continue senna docusate and daily MiraLax.   - successful bowel movement on 8/1  - resolved, 2 BMs on 8/5 and 8/6    GERD  - improving, continue sucralfate 100 mg AC/HS along with simethicone 90 mg TID PC x3 days, DC Protonix 40 mg daily, continue PRN tums and mylamta    Generalized anxiety  - stable, continue alprazolam 1 mg qHS. PRN    BPH  - stable, continue finasteride 5 mg daily     HLD  - continue atorvastatin 40 mg qHS    Debility   - Continue with PT/OT for gait training and strengthening and restoration of ADL's   - Encourage mobility, OOB in chair, and early ambulation as appropriate  - Fall precautions   - Monitor for bowel and bladder dysfunction  - Monitor for and prevent skin breakdown and pressure ulcers  - Continue DVT prophylaxis " with ASA 81 mg BID    Anticipate disposition:  Home with home health      Follow-up needed during SNF stay-f/u with [facility] on 8/16 1030AM , [place] location- will not show up in EPIC    Follow-up needed after discharge from SNF: PCP, [facility]    No future appointments.        I spent 47 minutes reviewing patient records, examining, and counseling the patient/ patient's family with greater than 50% of the time spent in direct patient care and coordination.      Jenifer Jesus NP  Department of Hospital Medicine   [facility]     DOS: 8/8/2023       Patient note was created using MModal Dictation.  Any errors in syntax or even information may not have been identified and edited on initial review prior to signing this note.

## 2023-08-08 NOTE — PLAN OF CARE
Family Training     Patient Name:  Mao Pearce   MRN:  [id number]  Admit Date: 7/27/2023      [facility] unable to schedule family training due to no family/caregiver availability.     8/8/2023

## 2023-08-08 NOTE — PT/OT/SLP PROGRESS
Occupational Therapy   Treatment    Name: Mao Pearce  MRN: [id number]  Admit Date: 7/27/2023  Admitting Diagnosis:  Bilateral primary osteoarthritis of knee    General Precautions: Standard, fall   Orthopedic Precautions: RLE weight bearing as tolerated, LLE weight bearing as tolerated   Braces: N/A    Recommendations:     Discharge Recommendations:  home health OT  Level of Assistance Recommended at Discharge: Intermittent supervision  Discharge Equipment Recommendations: bedside commode, bath bench, walker, rolling, hip kit  Barriers to discharge:  Decreased caregiver support    Assessment:     Mao Pearce is a 77 y.o. male with a medical diagnosis of Bilateral primary osteoarthritis of knee.  He presents with limitations in performance of self-care, functional mobility, and ADLs. Performance deficits affecting function are weakness, impaired endurance, impaired self care skills, impaired functional mobility, gait instability, impaired balance, decreased lower extremity function, decreased safety awareness, pain, decreased ROM, orthopedic precautions, impaired skin, decreased coordination. Pt tolerated Tx without incident and is making progress but continues to require assist to perform self care tasks, functional mobility and functional transfers. Pt would continue to benefit from OT intervention to further functional (I)ce and safety.     Rehab Potential is good    Activity tolerance:  Good    Plan:     Patient to be seen 5 x/week to address the above listed problems via self-care/home management, community/work re-entry, therapeutic activities, therapeutic exercises, neuromuscular re-education    Plan of Care Expires: 08/26/23  Plan of Care Reviewed with: patient    Subjective     Communicated with: Nursing prior to session.     Pain/Comfort:  Pain Rating 1:  (did not rate)  Location - Side 1: Bilateral  Location - Orientation 1: generalized  Location 1: knee  Pain Addressed 1: Reposition,  Distraction, Pre-medicate for activity, Nurse notified  Pain Rating Post-Intervention 1: 6/10    Patient's cultural, spiritual, Orthodox conflicts given the current situation:  no    Objective:     Patient found up in chair with  (no active lines) upon OT entry to room.    Bed Mobility:    Pt seated in chair at onset of treatment session.     Functional Mobility/Transfers:  Patient completed Sit <> Stand Transfer with supervision  with  rolling walker   Patient completed Chair <> Bed Step Transfer technique with supervision with rolling walker  Functional Mobility: Pt propelled W/C from room to therapy gym with sup for a total of 170 ft using BUE to propel chair.     Activities of Daily Living:  Lower Body Dressing: stand by assistance to doff/bridgett socks seated EOB    AMPAC 6 Click ADL: 22    OT Exercises: Pt performed bilateral UE exercise with 6 lb dumbbells through functional ROM with rep of 15 x2 for each motion. Pt performed UBE exercise in standing for 5.5 minutes with Mod resistance.  UE exercises performed to increase functional endurance and strength in order increase independence when performing self care tasks, functional ambulation, W/C propulsion, and functional standing activities .      Treatment & Education:  Pt participated in gross motor standing activity with CGA and RW with large exercise ball. Pt performed standing activity with RW and SBA at raised counter reaching overhead in cabinets to get/put groceries. Activities with focus on standing tolerance, functional reaching, dynamic standing bal, crossing midline, and to promote independence with homemaking and self care tasks.      Pt educated on:  - role of OT  - level of assistance  - energy conservation and task modification to maximized independence with ADL's and mobility   -  safety while performing functional transfers and self care tasks  - progress towards OT goals      Patient left sitting edge of bed with call button in  reach    GOALS:   Multidisciplinary Problems       Occupational Therapy Goals          Problem: Occupational Therapy    Goal Priority Disciplines Outcome Interventions   Occupational Therapy Goal     OT, PT/OT Ongoing, Progressing    Description: Goals to be met by: 8/26/2023     Patient will increase functional independence with ADLs by performing:    LE Dressing with Modified [place] and AE as needed- Partially met- Set-up  Toileting from toilet with Modified [place] for hygiene and clothing management and AE as needed.   Bathing from  shower chair/bench with Modified [place] and AE as needed.   Supine to sit with Modified [place] and AE as needed - Met  Step transfer with Modified [place] and LRAD as needed  Toilet transfer to toilet with Modified [place] and LRAD as needed.   Tolerate standing for ~10 minutes /c LRAD- Met  Perform functional tasks in standing for ~10 min /c LRAD- Met                         Time Tracking:     OT Date of Treatment: 08/08/23  OT Start Time: 1352    OT Stop Time: 1420  OT Total Time (min): 28 min    Billable Minutes:Therapeutic Activity 13  Therapeutic Exercise 15    8/8/2023

## 2023-08-08 NOTE — PLAN OF CARE
[facility] - Skilled Nursing      HOME HEALTH ORDERS  FACE TO FACE ENCOUNTER    Patient Name: Mao Pearce  YOB: 1945    PCP: No, Primary Doctor   PCP Address: None  PCP Phone Number: None  PCP Fax: None    Encounter Date: 7/27/23    Admit to Home Health    Diagnoses:  Active Hospital Problems    Diagnosis  POA    *Bilateral primary osteoarthritis of knee [M17.0]  Yes    Status post total bilateral knee replacement [Z96.653]  Not Applicable    BPH (benign prostatic hyperplasia) [N40.0]  Yes    Debility [R53.81]  Yes    Arteriosclerosis of coronary artery [I25.10]  Yes    Hyperlipidemia [E78.5]  Yes    Anxiety [F41.9]  Yes      Resolved Hospital Problems   No resolved problems to display.       Follow Up Appointments:  No future appointments.    Allergies:  Review of patient's allergies indicates:   Allergen Reactions    Ciprofloxacin Rash    Metronidazole Rash       Medications: Review discharge medications with patient and family and provide education.      I have seen and examined this patient within the last 30 days. My clinical findings that support the need for the home health skilled services and home bound status are the following:no   Weakness/numbness causing balance and gait disturbance due to Surgery making it taxing to leave home.       Referrals/ Consults  Physical Therapy to evaluate and treat. Evaluate for home safety and equipment needs; Establish/upgrade home exercise program. Perform / instruct on therapeutic exercises, gait training, transfer training, and Range of Motion.  Occupational Therapy to evaluate and treat. Evaluate home environment for safety and equipment needs. Perform/Instruct on transfers, ADL training, ROM, and therapeutic exercises.  Aide to provide assistance with personal care, ADLs, and vital signs.    Activities:   activity as tolerated    Nursing:   Agency to admit patient within 24 hours of hospital discharge unless specified on physician order or at  patient request    SN to complete comprehensive assessment including routine vital signs. Instruct on disease process and s/s of complications to report to MD. Review/verify medication list sent home with the patient at time of discharge  and instruct patient/caregiver as needed. Frequency may be adjusted depending on start of care date.     Skilled nurse to perform up to 3 visits PRN for symptoms related to diagnosis    Notify MD if SBP > 160 or < 90; DBP > 90 or < 50; HR > 120 or < 50; Temp > 101; O2 < 88%    Ok to schedule additional visits based on staff availability and patient request on consecutive days within the home health episode.    Home Health Aide:  Nursing Three times weekly, Physical Therapy Three times weekly, Occupational Therapy Three times weekly, and Home Health Aide Three times weekly    Wound Care Orders    Keep incision clean and dry.  Cleanse with soap and water daily, pat dry.  Do not submerge under water until cleared by Ortho.       I certify that this patient is confined to his home and needs intermittent skilled nursing care, physical therapy, and occupational therapy.

## 2023-08-09 PROCEDURE — 97116 GAIT TRAINING THERAPY: CPT | Mod: CQ

## 2023-08-09 PROCEDURE — 11000004 HC SNF PRIVATE

## 2023-08-09 PROCEDURE — 97535 SELF CARE MNGMENT TRAINING: CPT | Mod: CO

## 2023-08-09 PROCEDURE — 25000003 PHARM REV CODE 250: Performed by: HOSPITALIST

## 2023-08-09 PROCEDURE — 25000003 PHARM REV CODE 250: Performed by: NURSE PRACTITIONER

## 2023-08-09 PROCEDURE — 97110 THERAPEUTIC EXERCISES: CPT | Mod: CQ

## 2023-08-09 RX ORDER — LOPERAMIDE HYDROCHLORIDE 2 MG/1
2 CAPSULE ORAL ONCE
Status: COMPLETED | OUTPATIENT
Start: 2023-08-09 | End: 2023-08-09

## 2023-08-09 RX ADMIN — OXYCODONE HYDROCHLORIDE AND ACETAMINOPHEN 1 TABLET: 10; 325 TABLET ORAL at 08:08

## 2023-08-09 RX ADMIN — FINASTERIDE 5 MG: 5 TABLET, FILM COATED ORAL at 08:08

## 2023-08-09 RX ADMIN — ASPIRIN 81 MG 81 MG: 81 TABLET ORAL at 08:08

## 2023-08-09 RX ADMIN — ACETAMINOPHEN 650 MG: 325 TABLET ORAL at 08:08

## 2023-08-09 RX ADMIN — LOPERAMIDE HYDROCHLORIDE 2 MG: 2 CAPSULE ORAL at 11:08

## 2023-08-09 RX ADMIN — MICONAZOLE NITRATE 2 % TOPICAL POWDER: at 08:08

## 2023-08-09 RX ADMIN — TAMSULOSIN HYDROCHLORIDE 0.4 MG: 0.4 CAPSULE ORAL at 08:08

## 2023-08-09 RX ADMIN — ATORVASTATIN CALCIUM 40 MG: 40 TABLET, FILM COATED ORAL at 08:08

## 2023-08-09 NOTE — PT/OT/SLP PROGRESS
"Physical Therapy Treatment    Patient Name:  Mao Pearce   MRN:  [id number]  Admit Date: 7/27/2023  Admitting Diagnosis: Bilateral primary osteoarthritis of knee  Recent Surgeries:     General Precautions: Standard, fall  Orthopedic Precautions: LLE weight bearing as tolerated, RLE weight bearing as tolerated  Braces: N/A    Recommendations:     Discharge Recommendations: home health PT  Level of Assistance Recommended at Discharge: Intermittent assistance   Discharge Equipment Recommendations: bedside commode, bath bench, hip kit, walker, rolling  Barriers to discharge: Decreased caregiver support    Assessment:     Mao Pearce is a 77 y.o. male admitted with a medical diagnosis of Bilateral primary osteoarthritis of knee . Patient progressing well towards his goasl, as noted by improved walking range on each visit.      Performance deficits affecting function: weakness, impaired endurance, impaired self care skills, impaired functional mobility, gait instability, impaired balance, decreased coordination, decreased safety awareness, decreased ROM, pain, impaired skin, orthopedic precautions.    Rehab Potential is excellent and good    Activity Tolerance: Excellent and Good    Plan:     Patient to be seen 5 x/week to address the above listed problems via gait training, therapeutic activities, therapeutic exercises, neuromuscular re-education, wheelchair management/training    Plan of Care Expires: 08/17/23  Plan of Care Reviewed with: patient    Subjective     Patient states " I've been going to the bathroom a lot".     Pain/Comfort:  Pain Rating 1: 0/10  Location - Side 1: Bilateral  Location - Orientation 1: generalized  Location 1: knee  Pain Addressed 1: Reposition, Distraction  Pain Rating Post-Intervention 1: 2/10    Patient's cultural, spiritual, Buddhism conflicts given the current situation:  no    Objective:     Communicated with NSG prior to session.  Patient found sitting edge of bed with   " upon PT entry to room.     Therapeutic Activities and Exercises: Patient transferred from bed<>w/c with RW and SBA. LE Ergometer x 15 minutes.    Functional Mobility:  Transfers:     Sit to Stand:  stand by assistance with rolling walker  Bed to Chair: stand by assistance with  rolling walker  using  Stand Pivot  Gait: 480 ft with RW and SBA, with B LE WBAT.  Wheelchair Propulsion:  Pt propelled Standard wheelchair x 180 ft x 2 feet on Level tile with  Right upper extremity and Left upper extremity with Supervision or Set-up Assistance.     AM-PAC 6 CLICK MOBILITY  21    Patient left up in chair with call button in reach.    GOALS:   Multidisciplinary Problems       Physical Therapy Goals          Problem: Physical Therapy    Goal Priority Disciplines Outcome Goal Variances Interventions   Physical Therapy Goal     PT, PT/OT Ongoing, Progressing     Description: Goals to be met by: 23     Patient will increase functional independence with mobility by performin. Supine to sit with [place].  2. Sit to supine with [place].  3. Rolling to Left and Right with [place].  4. Sit to stand transfer with Modified [place].  5. Bed to chair transfer with Modified [place] using Rolling Walker.  6. Gait x 150 feet with Modified [place] using Rolling Walker.  7. Wheelchair propulsion x 150 feet with Modified [place] using bilateral upper extremities.                         Time Tracking:     PT Received On: 23  PT Start Time: 1134  PT Stop Time: 1212  PT Total Time (min): 38 min    Billable Minutes: Gait Training 13 and Therapeutic Exercise 25    Treatment Type: Treatment  PT/PTA: PTA     Number of PTA visits since last PT visit: 2023

## 2023-08-09 NOTE — PT/OT/SLP PROGRESS
Occupational Therapy   Treatment    Name: Mao Pearce  MRN: [id number]  Admit Date: 7/27/2023  Admitting Diagnosis:  Bilateral primary osteoarthritis of knee    General Precautions: Standard, fall   Orthopedic Precautions: LLE weight bearing as tolerated, RLE weight bearing as tolerated   Braces: N/A    Recommendations:     Discharge Recommendations:  home health OT  Level of Assistance Recommended at Discharge: Intermittent supervision  Discharge Equipment Recommendations: bedside commode, bath bench, walker, rolling, hip kit  Barriers to discharge:  Decreased caregiver support    Assessment:     Mao Pearce is a 77 y.o. male with a medical diagnosis of Bilateral primary osteoarthritis of knee.  He presents with limitations in performance of self-care, functional mobility, and ADLs. Performance deficits affecting function are weakness, impaired endurance, impaired self care skills, impaired functional mobility, gait instability, impaired balance, decreased lower extremity function, decreased safety awareness, pain, decreased ROM, orthopedic precautions, impaired skin, decreased coordination. Pt with v/c for safety during Tx; with pt appearing impulsive with movements. Pt tolerated Tx without incident and is making progress but continues to require assist to perform self care tasks, functional mobility and functional transfers. Pt would continue to benefit from OT intervention to further functional (I)ce and safety.     Rehab Potential is good    Activity tolerance:  Good    Plan:     Patient to be seen 5 x/week to address the above listed problems via self-care/home management, community/work re-entry, therapeutic activities, therapeutic exercises, neuromuscular re-education    Plan of Care Expires: 08/26/23  Plan of Care Reviewed with: patient    Subjective     Communicated with: Nursing prior to session.     Pain/Comfort:  Pain Rating 1: 4/10  Location - Side 1: Bilateral  Location - Orientation 1:  generalized  Location 1: knee  Pain Addressed 1: Reposition, Distraction, Pre-medicate for activity  Pain Rating Post-Intervention 1: 4/10    Patient's cultural, spiritual, Moravian conflicts given the current situation:  no    Objective:     Patient found sitting edge of bed with  (no active lines) upon OT entry to room.    Bed Mobility:    Pt seated EOB at onset of treatment session.     Functional Mobility/Transfers:  Patient completed Sit <> Stand Transfer with supervision  with  no assistive device, rolling walker, and grab bars(s)   Patient completed Chair <> Bed Step Transfer technique with stand by assistance with rolling walker  Patient completed  Shower Transfer Step Transfer technique with stand by assistance with rolling walker and shower bench  Functional Mobility: Pt ambulated functional distances in room/bathroom with RW and SBA with v/c for safety with pt appearing to be ambulating at increased/impulsive speed.     Activities of Daily Living:  Grooming: supervision standing sinkside with set up and no AD  Bathing: supervision seated in shower with grab bars in standing to perform brandin care  Upper Body Dressing: modified independence to doff/bridgett pull over shirt  Lower Body Dressing: supervision to bridgett shorts and doff/bridgett B socks    [facility] 6 Click ADL: 22      Treatment & Education:  Pt educated on:  - role of OT  - level of assistance  - energy conservation and task modification to maximized independence with ADL's and mobility   -  safety while performing functional transfers and self care tasks  - progress towards OT goals      Patient left sitting edge of bed with call button in reach and dietary present    GOALS:   Multidisciplinary Problems       Occupational Therapy Goals          Problem: Occupational Therapy    Goal Priority Disciplines Outcome Interventions   Occupational Therapy Goal     OT, PT/OT Ongoing, Progressing    Description: Goals to be met by: 8/26/2023     Patient will increase  functional independence with ADLs by performing:    LE Dressing with Modified [place] and AE as needed- Partially met- Set-up  Toileting from toilet with Modified [place] for hygiene and clothing management and AE as needed.   Bathing from  shower chair/bench with Modified [place] and AE as needed.   Supine to sit with Modified [place] and AE as needed - Met  Step transfer with Modified [place] and LRAD as needed  Toilet transfer to toilet with Modified [place] and LRAD as needed.   Tolerate standing for ~10 minutes /c LRAD- Met  Perform functional tasks in standing for ~10 min /c LRAD- Met                         Time Tracking:     OT Date of Treatment: 08/09/23  OT Start Time: 0930    OT Stop Time: 1009  OT Total Time (min): 39 min    Billable Minutes:Self Care/Home Management 39    8/9/2023

## 2023-08-09 NOTE — PROGRESS NOTES
[facility]                                  Skilled Nursing Facility                   Progress Note     Admit Date: 7/27/2023  FARHAT 8/17/2023  Principal Problem:  Bilateral primary osteoarthritis of knee   HPI obtained from patient interview and chart review     Chief Complaint:Re-evaluation of medical treatment and therapy status:  Re- eval perineal discomfort    HPI:   Mr. Pearce is a 74-year-old male with PMHx of HLD, BPH, osteoarthritis who presents to SNF following hospitalization for bilateral primary osteoarthritis s/p bilateral total knee arthroplasty on 07/25 with Dr. Cox.  For secondary weakness and debility.    Interval history:   24 hr vital sign ranges listed below.  UA with reflex was collected yesterday is negative for infection.  PSA is within normal limits.  Patient is still states he is having painful urination/perineal discomfort.  I am unsure how true his complaints are.  Issue until overnight on Sunday when a PCT told him that his urine was dark which could mean infection.  Pain medication possibly causing urinary hesitancy that is leading to pain.  Patient states that he is bearing down to begin his urine stream which is also eliciting bowel movements which is upsetting him.  Patient was previously with profound constipation now having loose bowels likely from all of the laxatives he had been receiving.  Patient denies shortness of breath, abdominal discomfort, nausea, or vomiting.  Patient reports an adequate appetite. Patient progressing with PT/OT- Gait: 330ft w/ RW and SBA. Continuing to follow and treat all acute and chronic conditions.      Past Medical History: Patient has a past medical history of BPH (benign prostatic hyperplasia), Hair loss, Hyperlipidemia, and Osteoarthritis.    Past Surgical History: Patient has a past surgical history that includes Knee arthroscopy; Tonsillectomy; Hip replacement  arthroplasty (Left, 5/7/2020); and Total knee arthroplasty (Bilateral, 7/25/2023).    Social History: Patient reports that he quit smoking about 37 years ago. His smoking use included cigarettes. He has never used smokeless tobacco. He reports current alcohol use. He reports that he does not currently use drugs.    Family History: family history includes No Known Problems in his father and mother. He was adopted.    Allergies: Patient is allergic to ciprofloxacin and metronidazole.    ROS  Constitutional: Negative for fever   Eyes: Negative for blurred vision, double vision   Respiratory: Negative for cough, shortness of breath   Cardiovascular: Negative for chest pain, palpitations, and leg swelling.   Gastrointestinal: Negative for abdominal pain, diarrhea, nausea, vomiting, constipation, indigestion  Genitourinary: + for hesitancy, dysuria, frequency.   Musculoskeletal:  + generalized weakness. Negative for back pain and myalgias.   Skin: Negative for itching and rash.   Neurological: Negative for dizziness, headaches.   Psychiatric/Behavioral: Negative for depression. The patient is nervous/anxious.      24 hour Vital Sign Range   Temp:  [97.3 °F (36.3 °C)-97.7 °F (36.5 °C)]   Pulse:  [80-81]   Resp:  [18-19]   BP: (139)/(73-78)   SpO2:  [95 %-100 %]     Current BMI: Body mass index is 28.88 kg/m².    PEx  Constitutional: Patient appears debilitated.  No distress noted  HENT:   Head: Normocephalic and atraumatic.   Eyes: Pupils are equal, round  Neck: Normal range of motion. Neck supple.   Cardiovascular: Normal rate, regular rhythm and normal heart sounds.    Pulmonary/Chest: Effort normal and breath sounds are clear  Abdominal: Soft. Bowel sounds are normal.   Musculoskeletal: Normal range of motion.   Neurological: Alert and oriented to person, place, and time.   Psychiatric:  Anxious mood and affect. Behavior is normal.   Skin: Skin is warm and dry.  Bilateral surgical incisions to knees, staples removed  "today, incisions in approximated Steri-Strips placed    No results for input(s): "GLUCOSE", "NA", "K", "CL", "CO2", "BUN", "CREATININE", "CALCIUM", "MG" in the last 24 hours.          No results for input(s): "WBC", "RBC", "HGB", "HCT", "PLT", "MCV", "MCH", "MCHC" in the last 24 hours.          No results for input(s): "POCTGLUCOSE" in the last 168 hours.     Assessment and Plan:      Perineal area discomfort  - persisting, UA from 08/06 and 8/8- for infection, PSA normal, continue Flomax 0.4 mg qHS., continue miconazole 2% BID.  Unsure if this is psychosomatic or hesitancy from pain medication.  I offered to send patient to a urology appointment today or this week, he declines at this time    Bilateral knee osteoarthritis  S/p bilateral TKA on 07/26  - PT/OT, WBAT  - DVT PPX with ASA 81 mg BID  - dressing to be changed on postop day 3 and every 3 days afterwards or PRN for saturation  - f/u with [facility] on 8/16 1030AM , [place] location    Acute postoperative pain  - improved, continue Percocet 10/325 1000 mg q.8 hours. DC'ed methocarbamol 500 mg QID    Constipation  - 8/1 ordered brown bottom enema today.  received multiple enemas, lactulose q.6 hours PRN , suppository on 7/31, continue senna docusate and daily MiraLax.   - successful bowel movement on 8/1  - resolved, 2 BMs on 8/5 and 8/6    GERD  - improving, continue sucralfate 100 mg AC/HS along with simethicone 90 mg TID PC x3 days, DC Protonix 40 mg daily, continue PRN tums and mylamta    Generalized anxiety  - stable, continue alprazolam 1 mg qHS. PRN    BPH  - stable, continue finasteride 5 mg daily     HLD  - continue atorvastatin 40 mg qHS    Debility   - Continue with PT/OT for gait training and strengthening and restoration of ADL's   - Encourage mobility, OOB in chair, and early ambulation as appropriate  - Fall precautions   - Monitor for bowel and bladder dysfunction  - Monitor for and prevent skin breakdown and pressure ulcers  - " Continue DVT prophylaxis with ASA 81 mg BID    Anticipate disposition:  Home with home health      Follow-up needed during SNF stay-f/u with [facility] on 8/16 1030AM , [place] location- will not show up in EPIC    Follow-up needed after discharge from SNF: PCP, [facility]    No future appointments.      Jenifer Jesus NP  Department of Hospital Medicine   [facility]     DOS: 8/9/2023       Patient note was created using MModal Dictation.  Any errors in syntax or even information may not have been identified and edited on initial review prior to signing this note.

## 2023-08-10 PROCEDURE — 25000003 PHARM REV CODE 250: Performed by: NURSE PRACTITIONER

## 2023-08-10 PROCEDURE — 97110 THERAPEUTIC EXERCISES: CPT | Mod: CO

## 2023-08-10 PROCEDURE — 97530 THERAPEUTIC ACTIVITIES: CPT | Mod: CO

## 2023-08-10 PROCEDURE — 25000003 PHARM REV CODE 250: Performed by: HOSPITALIST

## 2023-08-10 PROCEDURE — 11000004 HC SNF PRIVATE

## 2023-08-10 PROCEDURE — 97530 THERAPEUTIC ACTIVITIES: CPT

## 2023-08-10 PROCEDURE — 97116 GAIT TRAINING THERAPY: CPT

## 2023-08-10 RX ORDER — PANTOPRAZOLE SODIUM 40 MG/1
40 TABLET, DELAYED RELEASE ORAL DAILY
Status: DISCONTINUED | OUTPATIENT
Start: 2023-08-11 | End: 2023-08-11 | Stop reason: HOSPADM

## 2023-08-10 RX ORDER — LOPERAMIDE HYDROCHLORIDE 2 MG/1
2 CAPSULE ORAL 4 TIMES DAILY PRN
Status: DISCONTINUED | OUTPATIENT
Start: 2023-08-10 | End: 2023-08-11 | Stop reason: HOSPADM

## 2023-08-10 RX ORDER — HYDROCODONE BITARTRATE AND ACETAMINOPHEN 5; 325 MG/1; MG/1
1 TABLET ORAL EVERY 6 HOURS PRN
Status: DISCONTINUED | OUTPATIENT
Start: 2023-08-10 | End: 2023-08-11 | Stop reason: HOSPADM

## 2023-08-10 RX ADMIN — LOPERAMIDE HYDROCHLORIDE 2 MG: 2 CAPSULE ORAL at 02:08

## 2023-08-10 RX ADMIN — FINASTERIDE 5 MG: 5 TABLET, FILM COATED ORAL at 08:08

## 2023-08-10 RX ADMIN — TAMSULOSIN HYDROCHLORIDE 0.4 MG: 0.4 CAPSULE ORAL at 09:08

## 2023-08-10 RX ADMIN — Medication 1 CAPSULE: at 02:08

## 2023-08-10 NOTE — PROGRESS NOTES
[facility]                                  Skilled Nursing Facility                   Progress Note     Admit Date: 7/27/2023  FARHAT 8/14/2023  Principal Problem:  Bilateral primary osteoarthritis of knee   HPI obtained from patient interview and chart review     Chief Complaint:Re-evaluation of medical treatment and therapy status:  Re- eval perineal discomfort    HPI:   Mr. Pearce is a 74-year-old male with PMHx of HLD, BPH, osteoarthritis who presents to SNF following hospitalization for bilateral primary osteoarthritis s/p bilateral total knee arthroplasty on 07/25 with Dr. Cox.  For secondary weakness and debility.    Interval history:    24 hr vital sign ranges listed below. Pt states he longer has penile discomfort. His hesitancy has greatly improved.  He continues to have incontinence of his bowels, likely from all the laxatives that he had received from his profound constipation.  He received 1 dose of Imodium yesterday, will order PRN today.  Give carefully as patient is very prone to constipation.  Decreasing pain regimen, patient is in agreement.  Patient denies shortness of breath, abdominal discomfort, nausea, or vomiting.  Patient reports an adequate appetite.  Patient denies dysuria.  Patient reports having regular bowel movements.  Patient progressing with PT/OT. Continuing to follow and treat all acute and chronic conditions.    Past Medical History: Patient has a past medical history of BPH (benign prostatic hyperplasia), Hair loss, Hyperlipidemia, and Osteoarthritis.    Past Surgical History: Patient has a past surgical history that includes Knee arthroscopy; Tonsillectomy; Hip replacement arthroplasty (Left, 5/7/2020); and Total knee arthroplasty (Bilateral, 7/25/2023).    Social History: Patient reports that he quit smoking about 37 years ago. His smoking use included cigarettes. He has never used smokeless tobacco. He  "reports current alcohol use. He reports that he does not currently use drugs.    Family History: family history includes No Known Problems in his father and mother. He was adopted.    Allergies: Patient is allergic to ciprofloxacin and metronidazole.    ROS  Constitutional: Negative for fever   Eyes: Negative for blurred vision, double vision   Respiratory: Negative for cough, shortness of breath   Cardiovascular: Negative for chest pain, palpitations, and leg swelling.   Gastrointestinal: Negative for abdominal pain, , nausea, vomiting, constipation, indigestion.  +diarrhea  Genitourinary:  Negative for hesitancy, dysuria, frequency.   Musculoskeletal:  + generalized weakness. Negative for back pain and myalgias.   Skin: Negative for itching and rash.   Neurological: Negative for dizziness, headaches.   Psychiatric/Behavioral: Negative for depression. The patient is nervous/anxious.      24 hour Vital Sign Range   Temp:  [97.6 °F (36.4 °C)-98.6 °F (37 °C)]   Pulse:  [75-88]   Resp:  [17-18]   BP: (103-115)/(67-70)   SpO2:  [99 %]     Current BMI: Body mass index is 27.99 kg/m².    PEx  Constitutional: Patient appears debilitated.  No distress noted  HENT:   Head: Normocephalic and atraumatic.   Eyes: Pupils are equal, round  Neck: Normal range of motion. Neck supple.   Cardiovascular: Normal rate, regular rhythm and normal heart sounds.    Pulmonary/Chest: Effort normal and breath sounds are clear  Abdominal: Soft. Bowel sounds are normal.   Musculoskeletal: Normal range of motion.   Neurological: Alert and oriented to person, place, and time.   Psychiatric:  Anxious mood and affect. Behavior is normal.   Skin: Skin is warm and dry.  Bilateral surgical incisions to knees, staples removed today, incisions in approximated Steri-Strips placed    No results for input(s): "GLUCOSE", "NA", "K", "CL", "CO2", "BUN", "CREATININE", "CALCIUM", "MG" in the last 24 hours.          No results for input(s): "WBC", "RBC", "HGB", " ""HCT", "PLT", "MCV", "MCH", "MCHC" in the last 24 hours.          No results for input(s): "POCTGLUCOSE" in the last 168 hours.     Assessment and Plan:      Perineal area discomfort  - persisting, UA from 08/06 and 8/8- for infection, PSA normal, continue Flomax 0.4 mg qHS., continue miconazole 2% BID.  Unsure if this is psychosomatic or hesitancy from pain medication.  I offered to send patient to a urology appointment today or this week, he declines at this time  - 8/10 resolved    Bilateral knee osteoarthritis  S/p bilateral TKA on 07/26  - PT/OT, WBAT  - DVT PPX with ASA 81 mg BID  - dressing to be changed on postop day 3 and every 3 days afterwards or PRN for saturation  - f/u with [facility] on 8/16 1030AM , [place] location    Acute postoperative pain  - stable, pain regimen reduced 2 Norco 5/325 q.6 hours PRN for moderate to severe pain, acetaminophen 650 mg q.6 hours PRN for mild pain.  Percocet discontinued    Diarrhea  - patient with incontinence of bowels likely from his profound constipation requiring many different types of laxatives  - initiated PRN Imodium, probiotic    Constipation  - 8/1 ordered brown bottom enema today.  received multiple enemas, lactulose q.6 hours PRN , suppository on 7/31, continue senna docusate and daily MiraLax.   - successful bowel movement on 8/1  - resolved, patient now having loose stool    GERD  - improving, continue sucralfate 100 mg AC/HS along with simethicone 90 mg TID PC x3 days, DC Protonix 40 mg daily, continue PRN tums and mylamta    Generalized anxiety  - stable, continue alprazolam 1 mg qHS. PRN    BPH  - stable, continue finasteride 5 mg daily     HLD  - continue atorvastatin 40 mg qHS    Debility   - Continue with PT/OT for gait training and strengthening and restoration of ADL's   - Encourage mobility, OOB in chair, and early ambulation as appropriate  - Fall precautions   - Monitor for bowel and bladder dysfunction  - Monitor for and prevent " skin breakdown and pressure ulcers  - Continue DVT prophylaxis with ASA 81 mg BID    Anticipate disposition:  Home with home health      Follow-up needed during SNF stay-f/u with [facility] on 8/16 1030AM , [place] location- will not show up in EPIC    Follow-up needed after discharge from SNF: PCP, [facility]    No future appointments.      Jenifer Jesus NP  Department of Hospital Medicine   [facility]     DOS: 8/10/2023       Patient note was created using MModal Dictation.  Any errors in syntax or even information may not have been identified and edited on initial review prior to signing this note.

## 2023-08-10 NOTE — PLAN OF CARE
CHW served per [facility] to patient. Patient signed, a copy was given and faxed to [facility].

## 2023-08-10 NOTE — PLAN OF CARE
Problem: Adult Inpatient Plan of Care  Goal: Plan of Care Review  8/9/2023 2208 by Evangelina Hagan LPN  Outcome: Ongoing, Progressing     Problem: Adult Inpatient Plan of Care  Goal: Patient-Specific Goal (Individualized)  8/9/2023 2208 by Evangelina Hagan LPN  Outcome: Ongoing, Progressing  8/9/2023 2138 by Evangelina Hagan LPN  Outcome: Ongoing, Progressing     Problem: Adult Inpatient Plan of Care  Goal: Absence of Hospital-Acquired Illness or Injury  8/9/2023 2208 by Evangelina Hagan LPN  Outcome: Ongoing, Progressing  8/9/2023 2138 by Evangelina Hagan LPN  Outcome: Ongoing, Progressing     Problem: Adult Inpatient Plan of Care  Goal: Readiness for Transition of Care  8/9/2023 2208 by Evangelina Hagan LPN  Outcome: Ongoing, Progressing  8/9/2023 2138 by Evangelina Hagan LPN  Outcome: Ongoing, Progressing     Problem: Fall Injury Risk  Goal: Absence of Fall and Fall-Related Injury  8/9/2023 2138 by Evangelina Hagan LPN  Outcome: Ongoing, Progressing     Problem: Fall Injury Risk  Goal: Absence of Fall and Fall-Related Injury  8/9/2023 2208 by Evagnelina Hagan LPN  Outcome: Ongoing, Progressing  8/9/2023 2138 by Evangelina Hagan LPN  Outcome: Ongoing, Progressing

## 2023-08-10 NOTE — PT/OT/SLP PROGRESS
"Physical Therapy Treatment    Patient Name:  Mao Pearce   MRN:  [id number]  Admit Date: 7/27/2023  Admitting Diagnosis: Bilateral primary osteoarthritis of knee    General Precautions: Standard, fall  Orthopedic Precautions: RLE weight bearing as tolerated, LLE weight bearing as tolerated  Braces: N/A    Recommendations:     Discharge Recommendations: home health PT  Level of Assistance Recommended at Discharge: Intermittent supervision  Discharge Equipment Recommendations: bedside commode, bath bench, hip kit, walker, rolling  Barriers to discharge: Decreased caregiver support    Assessment:     Mao Pearce is a 77 y.o. male admitted with a medical diagnosis of Bilateral primary osteoarthritis of knee . Patient tolerate treatment well today.  He was able to practice bed mobility, standing, transfers, gait training, and stair negotiation. He reported that his knees felt tight today. Focus of today's session was improving overall mobility and strength. He is progressing very well, but has some medical concerns.    Performance deficits affecting function: weakness, impaired endurance, impaired self care skills, gait instability, impaired functional mobility, impaired balance, decreased lower extremity function, decreased ROM, orthopedic precautions.    Rehab Potential is good    Activity Tolerance: Good    Plan:     Patient to be seen 5 x/week to address the above listed problems via gait training, therapeutic activities, therapeutic exercises, neuromuscular re-education, wheelchair management/training    Plan of Care Expires: 08/17/23  Plan of Care Reviewed with: patient    Subjective     "My knees feel tight."     Pain/Comfort:  Pain Rating 1: 0/10  Pain Rating Post-Intervention 1: 0/10    Patient's cultural, spiritual, Judaism conflicts given the current situation:  no    Objective:     Communicated with rn prior to session.  Patient found HOB elevated upon PT entry to room.     Therapeutic Activities " and Exercises: Patient was able to practice bed mobility, standing, transfers, and gait training. He also performed the NuStep machine for 10 minutes on level 4 with a focus on improving knee range of motion and strength. Patient educated on and demonstrated lower extremity strengthening exercises to perform throughout the day.    Functional Mobility:  Bed Mobility:     Rolling Left:  supervision  Rolling Right: supervision  Supine to Sit: supervision  Transfers:     Sit to Stand:  stand by assistance with rolling walker  Bed to Chair: stand by assistance with  rolling walker  using  Step Transfer  Chair to mat: stand by assistance with  rolling walker  using  Step Transfer  Gait:   Patient ambulated from his room to the gym, around the gym, and then from the gym back to his room using a RW with SBA. Demonstrated decreased speed, decreased step length, and limited terminal knee extension in single leg stance.  Stairs:  Pt ascended/descended  4 stair(s) with No Assistive Device with bilateral handrails with Contact Guard Assistance.   Patient ascended stairs reciprocally, and descending stairs one at a time.    AM-PAC 6 CLICK MOBILITY  23    Patient left sitting edge of bed with call button in reach.    GOALS:   Multidisciplinary Problems       Physical Therapy Goals          Problem: Physical Therapy    Goal Priority Disciplines Outcome Goal Variances Interventions   Physical Therapy Goal     PT, PT/OT Ongoing, Progressing     Description: Goals to be met by: 23     Patient will increase functional independence with mobility by performin. Supine to sit with [place].  2. Sit to supine with [place].  3. Rolling to Left and Right with [place].  4. Sit to stand transfer with Modified [place].  5. Bed to chair transfer with Modified [place] using Rolling Walker.  6. Gait x 150 feet with Modified [place] using Rolling Walker.  7. Wheelchair propulsion x 150 feet with Modified  [place] using bilateral upper extremities.                         Time Tracking:     PT Received On: 08/10/23  PT Start Time: 0656  PT Stop Time: 0735  PT Total Time (min): 39 min    Billable Minutes: Gait Training 29 and Therapeutic Activity 10    Treatment Type: Treatment  PT/PTA: PT     Number of PTA visits since last PT visit: 0     08/10/2023

## 2023-08-10 NOTE — PT/OT/SLP PROGRESS
Occupational Therapy   Treatment    Name: Mao Pearce  MRN: [id number]  Admit Date: 7/27/2023  Admitting Diagnosis:  Bilateral primary osteoarthritis of knee    General Precautions: Standard, fall   Orthopedic Precautions: LLE weight bearing as tolerated, RLE weight bearing as tolerated   Braces: N/A    Recommendations:     Discharge Recommendations:  home health OT  Level of Assistance Recommended at Discharge: Intermittent supervision  Discharge Equipment Recommendations: bedside commode, bath bench, walker, rolling, hip kit  Barriers to discharge:  Decreased caregiver support    Assessment:     Mao Pearce is a 77 y.o. male with a medical diagnosis of Bilateral primary osteoarthritis of knee.  He presents with limitations in performance of self-care, functional mobility, and ADLs. Performance deficits affecting function are weakness, impaired endurance, impaired self care skills, impaired functional mobility, gait instability, impaired balance, decreased lower extremity function, decreased safety awareness, pain, decreased ROM, orthopedic precautions, impaired skin, decreased coordination. Pt tolerated Tx without incident and is making progress but continues to require assist to perform self care tasks, functional mobility and functional transfers. Pt would continue to benefit from OT intervention to further functional (I)ce and safety.     Rehab Potential is good    Activity tolerance:  Good    Plan:     Patient to be seen 5 x/week to address the above listed problems via self-care/home management, community/work re-entry, therapeutic activities, therapeutic exercises, neuromuscular re-education    Plan of Care Expires: 08/26/23  Plan of Care Reviewed with: patient    Subjective     Communicated with: Nursing prior to session.     Pain/Comfort:  Pain Rating 1: 3/10  Location - Side 1: Bilateral  Location - Orientation 1: generalized  Location 1: knee  Pain Addressed 1: Reposition, Distraction  Pain  Rating Post-Intervention 1: 3/10    Patient's cultural, spiritual, Anabaptist conflicts given the current situation:  no    Objective:     Patient found supine with  (no active lines) upon OT entry to room.    Bed Mobility:    Patient completed Scooting/Bridging with modified independence  Patient completed Supine to Sit with modified independence  Patient completed Sit to Supine with modified independence     Functional Mobility/Transfers:  Patient completed Sit <> Stand Transfer with supervision  with  rolling walker   Patient completed Chair <> Mat Step Transfer technique with supervision with rolling walker  Patient completed Toilet Transfer Step Transfer technique with stand by assistance with  rolling walker  front of toilet to void  Functional Mobility: Pt ambulated functional distance in room/bathroom with SBA and RW with v/c for safety awareness with pt ambulating with increased speed    Activities of Daily Living:  Toileting: supervision to void standing with RW     [facility] 6 Click ADL: 22    OT Exercises:  Pt performed bilateral UE exercise with 6 lb dumbbells through functional ROM with rep of 15 x2 for each motion. Pt performed UBE exercise in standing for 10 minutes with Mod resistance.  UE exercises performed to increase functional endurance and strength in order increase independence when performing self care tasks, functional ambulation, W/C propulsion, and functional standing activities .    Treatment & Education:  Pt educated on:  - role of OT  - level of assistance  - energy conservation and task modification to maximized independence with ADL's and mobility   -  safety while performing functional transfers and self care tasks  - progress towards OT goals      Patient left supine with call button in reach    GOALS:   Multidisciplinary Problems       Occupational Therapy Goals          Problem: Occupational Therapy    Goal Priority Disciplines Outcome Interventions   Occupational Therapy Goal      OT, PT/OT Ongoing, Progressing    Description: Goals to be met by: 8/26/2023     Patient will increase functional independence with ADLs by performing:    LE Dressing with Modified [place] and AE as needed- Partially met- Set-up  Toileting from toilet with Modified [place] for hygiene and clothing management and AE as needed.   Bathing from  shower chair/bench with Modified [place] and AE as needed.   Supine to sit with Modified [place] and AE as needed - Met  Step transfer with Modified [place] and LRAD as needed  Toilet transfer to toilet with Modified [place] and LRAD as needed.   Tolerate standing for ~10 minutes /c LRAD- Met  Perform functional tasks in standing for ~10 min /c LRAD- Met                         Time Tracking:     OT Date of Treatment: 08/10/23  OT Start Time: 1105    OT Stop Time: 1135  OT Total Time (min): 30 min    Billable Minutes:Therapeutic Activity 10  Therapeutic Exercise 20    8/10/2023

## 2023-08-11 VITALS
DIASTOLIC BLOOD PRESSURE: 65 MMHG | HEIGHT: 72 IN | WEIGHT: 206.38 LBS | HEART RATE: 72 BPM | OXYGEN SATURATION: 98 % | RESPIRATION RATE: 17 BRPM | SYSTOLIC BLOOD PRESSURE: 106 MMHG | TEMPERATURE: 98 F | BODY MASS INDEX: 27.95 KG/M2

## 2023-08-11 PROCEDURE — 25000003 PHARM REV CODE 250: Performed by: NURSE PRACTITIONER

## 2023-08-11 PROCEDURE — 25000003 PHARM REV CODE 250: Performed by: HOSPITALIST

## 2023-08-11 RX ORDER — HYDROCODONE BITARTRATE AND ACETAMINOPHEN 5; 325 MG/1; MG/1
1 TABLET ORAL EVERY 6 HOURS PRN
Qty: 15 TABLET | Refills: 0 | Status: SHIPPED | OUTPATIENT
Start: 2023-08-11

## 2023-08-11 RX ORDER — TAMSULOSIN HYDROCHLORIDE 0.4 MG/1
0.4 CAPSULE ORAL NIGHTLY
Qty: 30 CAPSULE | Refills: 0 | Status: SHIPPED | OUTPATIENT
Start: 2023-08-11 | End: 2024-08-10

## 2023-08-11 RX ORDER — POLYETHYLENE GLYCOL 3350 17 G/17G
17 POWDER, FOR SOLUTION ORAL 2 TIMES DAILY PRN
Refills: 0 | COMMUNITY
Start: 2023-08-11 | End: 2023-09-26

## 2023-08-11 RX ORDER — LOPERAMIDE HYDROCHLORIDE 2 MG/1
2 CAPSULE ORAL 4 TIMES DAILY PRN
Refills: 0 | COMMUNITY
Start: 2023-08-11 | End: 2023-08-21

## 2023-08-11 RX ADMIN — ACETAMINOPHEN 650 MG: 325 TABLET ORAL at 03:08

## 2023-08-11 RX ADMIN — Medication 1 CAPSULE: at 09:08

## 2023-08-11 RX ADMIN — ASPIRIN 81 MG 81 MG: 81 TABLET ORAL at 09:08

## 2023-08-11 RX ADMIN — FINASTERIDE 5 MG: 5 TABLET, FILM COATED ORAL at 09:08

## 2023-08-11 NOTE — HOSPITAL COURSE
Patient progressed well with PT and OT- last PT note states that patient ambulated***. Patient had no significant events during their stay at SNF. Home health was set up. DME was ordered if needed. Follow up appointment to be made by patient within one week. All prescriptions and discharge instructions were ordered to be given to the patient prior to discharge.     PEx  Constitutional: Patient appears debilitated.  No distress noted  HENT:   Head: Normocephalic and atraumatic.   Eyes: Pupils are equal, round  Neck: Normal range of motion. Neck supple.   Cardiovascular: Normal rate, regular rhythm and normal heart sounds.    Pulmonary/Chest: Effort normal and breath sounds are clear  Abdominal: Soft. Bowel sounds are normal.   Musculoskeletal: Normal range of motion.   Neurological: Alert and oriented to person, place, and time.   Psychiatric:  Anxious mood and affect. Behavior is normal.   Skin: Skin is warm and dry.  Bilateral surgical incisions to knees, incisions in approximated, without drainage or signs of infection, Steri-Strips in place

## 2023-08-11 NOTE — DISCHARGE SUMMARY
"[facility] Medicine  Discharge Summary      Patient Name: Mao Pearce  MRN: [id number]  JAIMEE: [id number]  Patient Class: - SNF  Admission Date: 7/27/2023  Hospital Length of Stay: 15 days  Discharge Date and Time:  08/11/2023 2:20 PM  Attending Physician: Tomi Gomez MD   Discharging Provider: Jenifer Jesus NP  Primary Care Provider: Chantal, Primary Doctor    Primary Care Team: LTAC 8 JENIFER JESUS     HPI:   Mr. Pearce is a 74-year-old male with PMHx of HLD, BPH, osteoarthritis who presents to SNF following hospitalization for bilateral primary osteoarthritis s/p bilateral total knee arthroplasty on 07/25 with Dr. Cox.  For secondary weakness and debility.     Patient originally evaluated in orthopedic clinic.  Ortho notes, Progressive pain deformity both knees.  This has been going on for years.  Intolerable at this point.  Had a hip replacement in the last year to his done well.  Hopefully same success on the knees.  Long discussion regarding bilateral versus unilateral with his age activity level and disposition elected for bilateral knee replacements." Patient was taken to the OR on 07/25 for bilateral total knee replacement.  No intraop complications noted, minimal EBL, skin closed with staples.  Patient admitted to PT/OT for continue strengthening.     Today, patient states his postoperative pain is not well controlled at this time.  He is currently wearing his polar ice device while sitting in a wheelchair.  He also states that his last bowel movement was on 07/24, the day prior to surgery.     Patient will be treated at [facility] with PT and OT to improve functional status and ability to perform ADLs.     Hospital Course:   Patient progressed well with PT and OT- last PT note states that patient ambulated 480 ft with RW and SBA, with B LE WBAT..  Patient admitted to SNF without having a bowel movement since the day prior to surgery.  Extensive bowel regimen was " increased each day, patient ultimately went 7 days without a bowel movement that was eventually relieved with brown bomb enema.  Patient then experienced intermittent constipation with diarrhea afterwards.  At day of discharge, patient states he was finally having formed stool.  Patient also developed penile discomfort, hesitancy, likely related to pain medication.  His PSA was normal, postvoid residuals were 0 mL.  UA with reflex was completed on 08/06 and 8/8 and was negative for infection.  He was given Flomax and had resolution of his symptoms.  Pain medication was greatly reduced at time of discharge, patient was given a plan for pain medication for home.  Patient has extreme anxiety and impulsiveness- extensive safety discussions were held with patient to take his time with things once home.  Home health was set up. DME was ordered if needed. Follow up appointment to be made by patient within one week. All prescriptions and discharge instructions were ordered to be given to the patient prior to discharge.     PEx  Constitutional: Patient appears debilitated.  No distress noted  HENT:   Head: Normocephalic and atraumatic.   Eyes: Pupils are equal, round  Neck: Normal range of motion. Neck supple.   Cardiovascular: Normal rate, regular rhythm and normal heart sounds.    Pulmonary/Chest: Effort normal and breath sounds are clear  Abdominal: Soft. Bowel sounds are normal.   Musculoskeletal: Normal range of motion.   Neurological: Alert and oriented to person, place, and time.   Psychiatric:  Anxious mood and affect. Behavior is normal.   Skin: Skin is warm and dry.  Bilateral surgical incisions to knees, incisions in approximated, without drainage or signs of infection, Steri-Strips in place       Goals of Care Treatment Preferences:  Code Status: Full Code      Consults:   Consults (From admission, onward)        Status Ordering Provider     Inpatient consult to Registered Dietitian/Nutritionist  Once       "  Provider:  (Not yet assigned)    Completed TC CASTELLANOS          No new Assessment & Plan notes have been filed under this hospital service since the last note was generated.  Service: Hospital Medicine    Final Active Diagnoses:    Diagnosis Date Noted POA    PRINCIPAL PROBLEM:  Bilateral primary osteoarthritis of knee [M17.0] 07/25/2023 Yes    Status post total bilateral knee replacement [Z96.653] 07/29/2023 Not Applicable    BPH (benign prostatic hyperplasia) [N40.0] 07/28/2023 Yes    Debility [R53.81] 07/28/2023 Yes    Arteriosclerosis of coronary artery [I25.10] 07/28/2023 Yes    Hyperlipidemia [E78.5] 03/08/2023 Yes    Anxiety [F41.9] 03/08/2023 Yes      Problems Resolved During this Admission:       Discharged Condition: good    Disposition: Home-Health Care [facility]    Follow Up:    Patient Instructions:      WALKER FOR HOME USE     Order Specific Question Answer Comments   Type of Walker: Adult (5'4"-6'6")    With wheels? Yes    Height: 6' (1.829 m)    Weight: 96.6 kg (212 lb 15.4 oz)    Length of need (1-99 months): 99    Does patient have medical equipment at home? none    Please check all that apply: Patient's condition impairs ambulation.    Please check all that apply: Patient is unable to safely ambulate without equipment.      HIP KIT FOR HOME USE     Order Specific Question Answer Comments   Height: 6' (1.829 m)    Weight: 96.6 kg (212 lb 15.4 oz)    Does patient have medical equipment at home? none    Length of need (1-99 months): 99    Type: [place] Hip Kit      3 IN 1 COMMODE FOR HOME USE     Order Specific Question Answer Comments   Type: Standard    Height: 6' (1.829 m)    Weight: 96.6 kg (212 lb 15.4 oz)    Does patient have medical equipment at home? none    Length of need (1-99 months): 99      TRANSFER TUB BENCH FOR HOME USE     Order Specific Question Answer Comments   Type of Transfer Tub Bench: Unpadded    Height: 6' (1.829 m)    Weight: 96.6 kg (212 lb 15.4 oz)    Does " patient have medical equipment at home? none    Length of need (1-99 months): 99    Patient notified - Not covered by insurance considered a convenience item No    Discussed financial responsibility with responsible party No      No driving until:   Order Comments: Cleared by PCP     Notify your health care provider if you experience any of the following:  temperature >100.4     Notify your health care provider if you experience any of the following:  persistent nausea and vomiting or diarrhea     Notify your health care provider if you experience any of the following:  severe uncontrolled pain     Notify your health care provider if you experience any of the following:  redness, tenderness, or signs of infection (pain, swelling, redness, odor or green/yellow discharge around incision site)     Notify your health care provider if you experience any of the following:  difficulty breathing or increased cough     Notify your health care provider if you experience any of the following:  severe persistent headache     Notify your health care provider if you experience any of the following:  worsening rash     Notify your health care provider if you experience any of the following:  persistent dizziness, light-headedness, or visual disturbances     Notify your health care provider if you experience any of the following:  increased confusion or weakness     Activity as tolerated       Significant Diagnostic Studies: N/A    Pending Diagnostic Studies:     None         Medications:  Reconciled Home Medications:      Medication List      START taking these medications    acetaminophen 325 MG tablet  Commonly known as: TYLENOL  Take 2 tablets (650 mg total) by mouth every 6 (six) hours as needed for Pain.     aspirin 81 MG Chew  Take 1 tablet (81 mg total) by mouth 2 (two) times daily. for 16 days     HYDROcodone-acetaminophen 5-325 mg per tablet  Commonly known as: NORCO  Take 1 tablet by mouth every 6 (six) hours as needed  for Pain.     loperamide 2 mg capsule  Commonly known as: IMODIUM  Take 1 capsule (2 mg total) by mouth 4 (four) times daily as needed for Diarrhea.     * polyethylene glycol 17 gram Pwpk  Commonly known as: GLYCOLAX  Take 17 g by mouth once daily.     * polyethylene glycol 17 gram Pwpk  Commonly known as: GLYCOLAX  Take 17 g by mouth 2 (two) times daily as needed (constipation).     senna-docusate 8.6-50 mg 8.6-50 mg per tablet  Commonly known as: PERICOLACE  Take 2 tablets by mouth 2 (two) times daily.     tamsulosin 0.4 mg Cap  Commonly known as: FLOMAX  Take 1 capsule (0.4 mg total) by mouth every evening.         * This list has 2 medication(s) that are the same as other medications prescribed for you. Read the directions carefully, and ask your doctor or other care provider to review them with you.            CONTINUE taking these medications    ALPRAZolam 1 MG tablet  Commonly known as: XANAX  TK 1 T PO QHS PRN     atorvastatin 40 MG tablet  Commonly known as: LIPITOR  Take 40 mg by mouth every evening.     finasteride 5 mg tablet  Commonly known as: PROSCAR  TK 1/2 T PO QD     pantoprazole 40 MG tablet  Commonly known as: PROTONIX        STOP taking these medications    oxyCODONE-acetaminophen  mg per tablet  Commonly known as: PERCOCET            Indwelling Lines/Drains at time of discharge:   Lines/Drains/Airways     None                 Time spent on the discharge of patient: 39 minutes         Jenifer Jesus NP  Department of [facility] Medicine  [facility]

## 2023-08-11 NOTE — PT/OT/SLP DISCHARGE
Occupational Therapy Discharge Summary    Mao Pearce  MRN: [id number]   Principal Problem: Bilateral primary osteoarthritis of knee      Patient Discharged from acute Occupational Therapy on 8/11/23.  Please refer to prior OT note dated 8/10/23 for functional status.    Assessment:      Patient was discharged unexpectedly.  Information required to complete an accurate discharge summary is unknown.  Refer to therapy initial evaluation and last progress note for initial and most recent functional status and goal achievement.  Recommendations made may be found in medical record.    Objective:     GOALS:   Multidisciplinary Problems       Occupational Therapy Goals          Problem: Occupational Therapy    Goal Priority Disciplines Outcome Interventions   Occupational Therapy Goal     OT, PT/OT Ongoing, Progressing    Description: Goals to be met by: 8/26/2023     Patient will increase functional independence with ADLs by performing:    LE Dressing with Modified [place] and AE as needed- Partially met- Set-up  Toileting from toilet with Modified [place] for hygiene and clothing management and AE as needed. - Not Met  Bathing from  shower chair/bench with Modified [place] and AE as needed. -Met  Supine to sit with Modified [place] and AE as needed - Met  Step transfer with Modified [place] and LRAD as needed -Not Met  Toilet transfer to toilet with Modified [place] and LRAD as needed. -Not Met  Tolerate standing for ~10 minutes /c LRAD- Met  Perform functional tasks in standing for ~10 min /c LRAD- Met                         Reasons for Discontinuation of Therapy Services  Transfer to alternate level of care.      Plan:     Patient Discharged to: Home with Home Health Service    8/11/2023

## 2023-08-11 NOTE — PT/OT/SLP PROGRESS
Physical Therapy      Patient Name:  Mao Pearce   MRN:  [id number]    Patient not seen today secondary to pt discharged today

## 2023-08-11 NOTE — NURSING
Pt left facility stable via w/c and transported home in personal vehicle. Received and reviewed discharge paperwork. Pt verbalize understanding. No new skin issues noted. Steri strips remain intact. All personal belongings taken by pt. No concerns voiced upon departure.

## 2023-08-11 NOTE — PLAN OF CARE
Patient scheduled;ed discharge was for Monday,wishes to leave today. Friend Bisi will  at 1 PM. Referral sent to [facility], at this writing no confirmation- relayed to patient. DME is walker and BSC, delivered to room.     [facility] to admit tomorrow as PT admit.

## 2023-08-11 NOTE — PT/OT/SLP PROGRESS
Occupational Therapy    Not Seen    Mao Pearce  MRN: [id number]  Room/Bed: JEJG462/OCJC442 A    Pt not seen by OT today secondary to pt refusal due to pt discharging later in day. Will complete discharge summary.     ENMA Watts  8/11/2023

## 2023-08-12 NOTE — PLAN OF CARE
Patient discharged on 23 prior to original scheduled date.     Problem: Physical Therapy  Goal: Physical Therapy Goal  Description: Goals to be met by: 23     Patient will increase functional independence with mobility by performin. Supine to sit with [place] - not met, Supervision  2. Sit to supine with [place] - not met, Supervision  3. Rolling to Left and Right with [place] - not met, Supervision  4. Sit to stand transfer with Modified [place] - not met, SBA for safety  5. Bed to chair transfer with Modified [place] using Rolling Walker - not met, SBA for safety  6. Gait x 150 feet with Modified [place] using Rolling Walker - not met, SBA for safety  7. Wheelchair propulsion x 150 feet with Modified [place] using bilateral upper extremities. - not met, Supervision    Outcome: Adequate for Care Transition   2023

## 2023-08-12 NOTE — PT/OT/SLP DISCHARGE
Physical Therapy Discharge Summary    Name: Mao Pearce  MRN: [id number]   Principal Problem: Bilateral primary osteoarthritis of knee     Patient Discharged from SNF on 23.  Please refer to prior PT note dated on 8/10/23 for current functional status.     Assessment:     Patient requested discharge home on 23 prior to scheduled discharge on 23. Patient functioning at a SBA/Supervision level of mobility. Recommend Home Health PT/OT to continue with progression of mobility toward PLOF.     Objective:     GOALS:   Multidisciplinary Problems       Physical Therapy Goals          Problem: Physical Therapy    Goal Priority Disciplines Outcome Goal Variances Interventions   Physical Therapy Goal     PT, PT/OT Adequate for Care Transition     Description: Goals to be met by: 23     Patient will increase functional independence with mobility by performin. Supine to sit with [place] - not met, Supervision  2. Sit to supine with [place] - not met, Supervision  3. Rolling to Left and Right with [place] - not met, Supervision  4. Sit to stand transfer with Modified [place] - not met, SBA for safety  5. Bed to chair transfer with Modified [place] using Rolling Walker - not met, SBA for safety  6. Gait x 150 feet with Modified [place] using Rolling Walker - not met, SBA for safety  7. Wheelchair propulsion x 150 feet with Modified [place] using bilateral upper extremities. - not met, Supervision                         Reasons for Discontinuation of Therapy Services  Patient requesting early D/C home.        Plan:     Patient Discharged to: Home with Home Health Service.      2023

## 2023-08-14 ENCOUNTER — PATIENT OUTREACH (OUTPATIENT)
Dept: ADMINISTRATIVE | Facility: CLINIC | Age: 78
End: 2023-08-14
Payer: MEDICARE

## 2023-08-14 PROCEDURE — G0180 MD CERTIFICATION HHA PATIENT: HCPCS | Mod: ,,, | Performed by: HOSPITALIST

## 2023-08-14 PROCEDURE — G0180 PR HOME HEALTH MD CERTIFICATION: ICD-10-PCS | Mod: ,,, | Performed by: HOSPITALIST

## 2023-08-14 NOTE — PROGRESS NOTES
C3 nurse spoke with Mao Pearce   for a TCC post hospital discharge follow up call. The patient does not have a scheduled HOSFU appointment with No, Primary Doctor  States he does not have a pcp does not know and has decided if he will get one  he is scheduled to see orthopedist and that is the only doctor he plans on seeing he is not going to see anyone else at this has not decided if he wants a PCP  he is home waiting on HH and PT at this time.

## 2023-09-21 ENCOUNTER — EXTERNAL HOME HEALTH (OUTPATIENT)
Dept: HOME HEALTH SERVICES | Facility: HOSPITAL | Age: 78
End: 2023-09-21
Payer: MEDICARE

## 2023-09-26 ENCOUNTER — OFFICE VISIT (OUTPATIENT)
Dept: URGENT CARE | Facility: CLINIC | Age: 78
End: 2023-09-26
Payer: MEDICARE

## 2023-09-26 VITALS
OXYGEN SATURATION: 96 % | RESPIRATION RATE: 16 BRPM | WEIGHT: 206 LBS | SYSTOLIC BLOOD PRESSURE: 124 MMHG | HEIGHT: 72 IN | TEMPERATURE: 98 F | BODY MASS INDEX: 27.9 KG/M2 | DIASTOLIC BLOOD PRESSURE: 86 MMHG | HEART RATE: 76 BPM

## 2023-09-26 DIAGNOSIS — J06.9 ACUTE URI: ICD-10-CM

## 2023-09-26 DIAGNOSIS — Z11.59 ENCOUNTER FOR SCREENING FOR OTHER VIRAL DISEASES: Primary | ICD-10-CM

## 2023-09-26 LAB
CTP QC/QA: YES
SARS-COV-2 AG RESP QL IA.RAPID: NEGATIVE

## 2023-09-26 PROCEDURE — 99213 PR OFFICE/OUTPT VISIT, EST, LEVL III, 20-29 MIN: ICD-10-PCS | Mod: S$GLB,,, | Performed by: FAMILY MEDICINE

## 2023-09-26 PROCEDURE — 87811 SARS CORONAVIRUS 2 ANTIGEN POCT, MANUAL READ: ICD-10-PCS | Mod: QW,S$GLB,, | Performed by: FAMILY MEDICINE

## 2023-09-26 PROCEDURE — 99213 OFFICE O/P EST LOW 20 MIN: CPT | Mod: S$GLB,,, | Performed by: FAMILY MEDICINE

## 2023-09-26 PROCEDURE — 87811 SARS-COV-2 COVID19 W/OPTIC: CPT | Mod: QW,S$GLB,, | Performed by: FAMILY MEDICINE

## 2023-09-26 NOTE — PROGRESS NOTES
Subjective:      Patient ID: Mao Pearce is a 78 y.o. male.    Vitals:  height is 6' (1.829 m) and weight is 93.4 kg (206 lb). His oral temperature is 98.3 °F (36.8 °C). His blood pressure is 124/86 and his pulse is 76. His respiration is 16 and oxygen saturation is 96%.     Chief Complaint: Sinus Problem    Patient reports coming in here 4 -5 times  wanting to be checked for covid. Patient report that all his friends are testing positive for covid. Patient reports mild cough and congestion.  Denies fever, chills, chest pain, SOB, diarrhea, vomiting, headache, body aches.    Sinus Problem  This is a recurrent problem. The current episode started in the past 7 days. The problem is unchanged. There has been no fever. Associated symptoms include sinus pressure. Pertinent negatives include no headaches. Treatments tried: flonase. The treatment provided mild relief.       HENT:  Positive for sinus pressure.    Neurological:  Negative for headaches.      Objective:     Physical Exam   Constitutional: He is oriented to person, place, and time. He appears well-developed. He is cooperative.  Non-toxic appearance. He does not appear ill. No distress.   HENT:   Head: Normocephalic and atraumatic.   Ears:   Right Ear: Hearing, tympanic membrane, external ear and ear canal normal. impacted cerumen  Left Ear: Hearing, tympanic membrane, external ear and ear canal normal. impacted cerumen  Nose: Congestion present. No mucosal edema, rhinorrhea or nasal deformity. No epistaxis. Right sinus exhibits no maxillary sinus tenderness and no frontal sinus tenderness. Left sinus exhibits no maxillary sinus tenderness and no frontal sinus tenderness.   Mouth/Throat: Uvula is midline, oropharynx is clear and moist and mucous membranes are normal. No trismus in the jaw. Normal dentition. No uvula swelling. No oropharyngeal exudate, posterior oropharyngeal edema or posterior oropharyngeal erythema.   Eyes: Conjunctivae and lids are  normal. No scleral icterus.   Neck: Trachea normal and phonation normal. Neck supple. No edema present. No erythema present. No neck rigidity present.   Cardiovascular: Normal rate, regular rhythm, normal heart sounds and normal pulses.   No murmur heard.  Pulmonary/Chest: Effort normal and breath sounds normal. No stridor. No respiratory distress. He has no decreased breath sounds. He has no wheezes. He has no rhonchi. He has no rales.   Abdominal: Normal appearance. He exhibits no distension. There is no abdominal tenderness.   Musculoskeletal: Normal range of motion.         General: No deformity. Normal range of motion.      Cervical back: He exhibits no tenderness.   Lymphadenopathy:     He has no cervical adenopathy.   Neurological: He is alert and oriented to person, place, and time. He exhibits normal muscle tone. Coordination normal.   Skin: Skin is warm, dry, intact, not diaphoretic and not pale.   Psychiatric: His speech is normal and behavior is normal. Judgment and thought content normal.   Nursing note and vitals reviewed.      Assessment:     1. Encounter for screening for other viral diseases    2. Acute URI        Plan:       Encounter for screening for other viral diseases  -     SARS Coronavirus 2 Antigen, POCT Manual Read    Acute URI

## 2023-11-07 ENCOUNTER — OFFICE VISIT (OUTPATIENT)
Dept: URGENT CARE | Facility: CLINIC | Age: 78
End: 2023-11-07
Payer: MEDICARE

## 2023-11-07 VITALS
TEMPERATURE: 98 F | HEIGHT: 72 IN | SYSTOLIC BLOOD PRESSURE: 144 MMHG | BODY MASS INDEX: 27.9 KG/M2 | OXYGEN SATURATION: 96 % | DIASTOLIC BLOOD PRESSURE: 83 MMHG | RESPIRATION RATE: 18 BRPM | HEART RATE: 71 BPM | WEIGHT: 206 LBS

## 2023-11-07 DIAGNOSIS — J04.0 ACUTE VIRAL LARYNGITIS: ICD-10-CM

## 2023-11-07 DIAGNOSIS — J02.9 SORE THROAT: Primary | ICD-10-CM

## 2023-11-07 LAB
CTP QC/QA: YES
MOLECULAR STREP A: NEGATIVE

## 2023-11-07 PROCEDURE — 87651 STREP A DNA AMP PROBE: CPT | Mod: QW,S$GLB,, | Performed by: FAMILY MEDICINE

## 2023-11-07 PROCEDURE — 87651 POCT STREP A MOLECULAR: ICD-10-PCS | Mod: QW,S$GLB,, | Performed by: FAMILY MEDICINE

## 2023-11-07 PROCEDURE — 99214 PR OFFICE/OUTPT VISIT, EST, LEVL IV, 30-39 MIN: ICD-10-PCS | Mod: 25,S$GLB,, | Performed by: FAMILY MEDICINE

## 2023-11-07 PROCEDURE — 99214 OFFICE O/P EST MOD 30 MIN: CPT | Mod: 25,S$GLB,, | Performed by: FAMILY MEDICINE

## 2023-11-07 PROCEDURE — 96372 THER/PROPH/DIAG INJ SC/IM: CPT | Mod: S$GLB,,, | Performed by: FAMILY MEDICINE

## 2023-11-07 PROCEDURE — 96372 PR INJECTION,THERAP/PROPH/DIAG2ST, IM OR SUBCUT: ICD-10-PCS | Mod: S$GLB,,, | Performed by: FAMILY MEDICINE

## 2023-11-07 RX ORDER — PREDNISONE 20 MG/1
20 TABLET ORAL DAILY
Qty: 3 TABLET | Refills: 0 | Status: SHIPPED | OUTPATIENT
Start: 2023-11-07 | End: 2023-11-10

## 2023-11-07 RX ORDER — DEXAMETHASONE SODIUM PHOSPHATE 100 MG/10ML
10 INJECTION INTRAMUSCULAR; INTRAVENOUS
Status: COMPLETED | OUTPATIENT
Start: 2023-11-07 | End: 2023-11-07

## 2023-11-07 RX ADMIN — DEXAMETHASONE SODIUM PHOSPHATE 10 MG: 100 INJECTION INTRAMUSCULAR; INTRAVENOUS at 08:11

## 2023-11-07 NOTE — PATIENT INSTRUCTIONS
Tylenol or ibuprofen for pain or fever  Warm salt water gargles  Warm tea with honey and lemon  Vocal rest  Start prednisone tomorrow morning  Follow-up with ENT if no improvement      You must understand that you have received treatment at an Urgent Care facility only, and that you may be  released before all of your medical problems are known or treated. Urgent Care facilities are not equipped to  handle life threatening emergencies. It is recommended that you seek care at an Emergency Department for  further evaluation of worsening or concerning symptoms, or possibly life threatening conditions as  discussed.      If you develop chest pain, shortness of breath, throat swelling, tongue swelling, lightheadedness or any other causes for concern, proceed to ER.

## 2023-11-07 NOTE — PROGRESS NOTES
Subjective:      Patient ID: Mao Pearce is a 78 y.o. male.    Vitals:  height is 6' (1.829 m) and weight is 93.4 kg (206 lb). His oral temperature is 98 °F (36.7 °C). His blood pressure is 144/83 (abnormal) and his pulse is 71. His respiration is 18 and oxygen saturation is 96%.     Chief Complaint: Sore Throat    Pt presents c.o. of sorer throat. Symps include sinus pressure. Symps began yesterday.    Pt is a 77 yo M who presents with sore throat and hoarseness that started yesterday.  He also reports a little sinus pressure and post nasal drip.  Denies fever chills and body aches. Patient rested his voice yesterday and today it is improved but no completely better. Patient states that he really needs to use his voice today due to planned social activities.    Sore Throat   The current episode started yesterday. The problem has been gradually worsening. Neither side of throat is experiencing more pain than the other. The pain is at a severity of 7/10. The pain is mild. Pertinent negatives include no abdominal pain, congestion, coughing, diarrhea, drooling, ear discharge, ear pain, headaches, hoarse voice, plugged ear sensation, neck pain or trouble swallowing. Exposure to: unknown. Treatments tried: Vitamin C. The treatment provided no relief.       HENT:  Positive for sore throat. Negative for ear pain, ear discharge, drooling, congestion and trouble swallowing.    Neck: Negative for neck pain.   Respiratory:  Negative for cough.    Gastrointestinal:  Negative for abdominal pain and diarrhea.   Neurological:  Negative for headaches.      Objective:     Physical Exam   Constitutional: He is oriented to person, place, and time. He appears well-developed. He is cooperative.  Non-toxic appearance. He does not appear ill. No distress.   HENT:   Head: Normocephalic and atraumatic.   Ears:   Right Ear: Hearing, tympanic membrane, external ear and ear canal normal.   Left Ear: Hearing, tympanic membrane, external  ear and ear canal normal.   Nose: Nose normal. No mucosal edema, rhinorrhea or nasal deformity. No epistaxis. Right sinus exhibits no maxillary sinus tenderness and no frontal sinus tenderness. Left sinus exhibits no maxillary sinus tenderness and no frontal sinus tenderness.   Mouth/Throat: Uvula is midline and mucous membranes are normal. No trismus in the jaw. Normal dentition. No uvula swelling. Oropharyngeal exudate and posterior oropharyngeal erythema present. No posterior oropharyngeal edema.   Eyes: Conjunctivae and lids are normal. No scleral icterus.   Neck: Trachea normal. Neck supple.      Comments: Vocal hoarseness No edema present. No erythema present. No neck rigidity present.   Cardiovascular: Normal rate, regular rhythm, normal heart sounds and normal pulses.   Pulmonary/Chest: Effort normal and breath sounds normal. No respiratory distress. He has no decreased breath sounds. He has no rhonchi.   Abdominal: Normal appearance.   Musculoskeletal: Normal range of motion.         General: No deformity. Normal range of motion.   Neurological: He is alert and oriented to person, place, and time. He exhibits normal muscle tone. Coordination normal.   Skin: Skin is warm, dry, intact, not diaphoretic and not pale.   Psychiatric: His speech is normal and behavior is normal. Judgment and thought content normal.   Nursing note and vitals reviewed.    Results for orders placed or performed in visit on 11/07/23   POCT Strep A, Molecular   Result Value Ref Range    Molecular Strep A, POC Negative Negative     Acceptable Yes        Assessment:     1. Sore throat    2. Acute viral laryngitis        Plan:       Sore throat  -     POCT Strep A, Molecular    Acute viral laryngitis  -     dexAMETHasone injection 10 mg  -     predniSONE (DELTASONE) 20 MG tablet; Take 1 tablet (20 mg total) by mouth once daily. for 3 days  Dispense: 3 tablet; Refill: 0             Patient Instructions   Tylenol or ibuprofen  for pain or fever  Warm salt water gargles  Warm tea with honey and lemon  Vocal rest  Start prednisone tomorrow morning  Follow-up with ENT if no improvement      You must understand that you have received treatment at an Urgent Care facility only, and that you may be  released before all of your medical problems are known or treated. Urgent Care facilities are not equipped to  handle life threatening emergencies. It is recommended that you seek care at an Emergency Department for  further evaluation of worsening or concerning symptoms, or possibly life threatening conditions as  discussed.      If you develop chest pain, shortness of breath, throat swelling, tongue swelling, lightheadedness or any other causes for concern, proceed to ER.

## 2024-01-16 ENCOUNTER — OFFICE VISIT (OUTPATIENT)
Dept: URGENT CARE | Facility: CLINIC | Age: 79
End: 2024-01-16
Payer: MEDICARE

## 2024-01-16 VITALS
OXYGEN SATURATION: 95 % | HEIGHT: 72 IN | TEMPERATURE: 98 F | HEART RATE: 73 BPM | DIASTOLIC BLOOD PRESSURE: 72 MMHG | BODY MASS INDEX: 27.9 KG/M2 | SYSTOLIC BLOOD PRESSURE: 114 MMHG | WEIGHT: 206 LBS | RESPIRATION RATE: 16 BRPM

## 2024-01-16 DIAGNOSIS — R49.0 HOARSENESS OF VOICE: ICD-10-CM

## 2024-01-16 DIAGNOSIS — J06.9 UPPER RESPIRATORY TRACT INFECTION, UNSPECIFIED TYPE: Primary | ICD-10-CM

## 2024-01-16 PROCEDURE — 99214 OFFICE O/P EST MOD 30 MIN: CPT | Mod: 25,S$GLB,, | Performed by: FAMILY MEDICINE

## 2024-01-16 PROCEDURE — 96372 THER/PROPH/DIAG INJ SC/IM: CPT | Mod: S$GLB,,, | Performed by: FAMILY MEDICINE

## 2024-01-16 RX ORDER — DEXAMETHASONE SODIUM PHOSPHATE 100 MG/10ML
10 INJECTION INTRAMUSCULAR; INTRAVENOUS
Status: COMPLETED | OUTPATIENT
Start: 2024-01-16 | End: 2024-01-16

## 2024-01-16 RX ADMIN — DEXAMETHASONE SODIUM PHOSPHATE 10 MG: 100 INJECTION INTRAMUSCULAR; INTRAVENOUS at 11:01

## 2024-01-16 NOTE — PATIENT INSTRUCTIONS
Rest  Fluids  Warm tea with honey and lemon  Warm salt water gargles  Warm sinus compresses  mucinex (guaifenesin)  Nasal saline  Tylenol or advil for pain or fever  IF no improvement or worsening, return for re-evaluation     You must understand that you have received treatment at an Urgent Care facility only, and that you may be  released before all of your medical problems are known or treated. Urgent Care facilities are not equipped to  handle life threatening emergencies. It is recommended that you seek care at an Emergency Department for  further evaluation of worsening or concerning symptoms, or possibly life threatening conditions as  discussed.      If you develop chest pain, shortness of breath, throat swelling, tongue swelling, lightheadedness or any other causes for concern, proceed to ER.

## 2024-01-16 NOTE — PROGRESS NOTES
Subjective:      Patient ID: Mao Pearce is a 78 y.o. male.    Vitals:  height is 6' (1.829 m) and weight is 93.4 kg (206 lb). His oral temperature is 97.9 °F (36.6 °C). His blood pressure is 114/72 and his pulse is 73. His respiration is 16 and oxygen saturation is 95%.     Chief Complaint: Sinus Problem    Patient reports sinus symptoms started days ago.Patient reports he took Ny Quil for his symptoms.Patient reports he has been tested 10 times for COVID and never had it.    Pt is a 79 yo M who presents with sinus pressure and pain, nasal congestion, sore throat, cough and vocal hoarseness.  Denies fever chills and body aches.  Last time he had this was in novemeber.  Steroid shot the vocal hoarseness.  Currently taking nyquil with some relief    Sinus Problem  This is a new problem. The current episode started in the past 7 days (3 days). The problem has been gradually worsening since onset. There has been no fever. His pain is at a severity of 4/10. Associated symptoms include coughing (productive), a hoarse voice, sinus pressure and a sore throat. Pertinent negatives include no congestion or headaches. Treatments tried: Ny Quil. The treatment provided mild relief.       HENT:  Positive for postnasal drip, sinus pain, sinus pressure and sore throat. Negative for congestion.    Respiratory:  Positive for cough (productive).    Neurological:  Negative for headaches.      Objective:     Physical Exam   Constitutional: He is oriented to person, place, and time. He appears well-developed. He is cooperative.  Non-toxic appearance. He does not appear ill. No distress.   HENT:   Head: Normocephalic and atraumatic.   Ears:   Right Ear: Hearing, tympanic membrane, external ear and ear canal normal.   Left Ear: Hearing, tympanic membrane, external ear and ear canal normal.   Nose: Nose normal. No mucosal edema, rhinorrhea or nasal deformity. No epistaxis. Right sinus exhibits no maxillary sinus tenderness and no  frontal sinus tenderness. Left sinus exhibits no maxillary sinus tenderness and no frontal sinus tenderness.   Mouth/Throat: Uvula is midline and mucous membranes are normal. No trismus in the jaw. Normal dentition. No uvula swelling. Posterior oropharyngeal erythema present. No oropharyngeal exudate or posterior oropharyngeal edema.   Eyes: Conjunctivae and lids are normal. No scleral icterus.   Neck: Trachea normal and phonation normal. Neck supple. No edema present. No erythema present. No neck rigidity present.   Cardiovascular: Normal rate, regular rhythm, normal heart sounds and normal pulses.   Pulmonary/Chest: Effort normal and breath sounds normal. No respiratory distress. He has no decreased breath sounds. He has no rhonchi.   Abdominal: Normal appearance.   Musculoskeletal: Normal range of motion.         General: No deformity. Normal range of motion.   Neurological: He is alert and oriented to person, place, and time. He exhibits normal muscle tone. Coordination normal.   Skin: Skin is warm, dry, intact, not diaphoretic and not pale.   Psychiatric: His speech is normal and behavior is normal. Judgment and thought content normal.   Nursing note and vitals reviewed.      Assessment:     1. Upper respiratory tract infection, unspecified type    2. Hoarseness of voice        Plan:       Upper respiratory tract infection, unspecified type    Hoarseness of voice  -     dexAMETHasone injection 10 mg             Patient Instructions   Rest  Fluids  Warm tea with honey and lemon  Warm salt water gargles  Warm sinus compresses  mucinex (guaifenesin)  Nasal saline  Tylenol or advil for pain or fever  IF no improvement or worsening, return for re-evaluation     You must understand that you have received treatment at an Urgent Care facility only, and that you may be  released before all of your medical problems are known or treated. Urgent Care facilities are not equipped to  handle life threatening emergencies. It  is recommended that you seek care at an Emergency Department for  further evaluation of worsening or concerning symptoms, or possibly life threatening conditions as  discussed.      If you develop chest pain, shortness of breath, throat swelling, tongue swelling, lightheadedness or any other causes for concern, proceed to ER.

## 2024-01-19 ENCOUNTER — OFFICE VISIT (OUTPATIENT)
Dept: URGENT CARE | Facility: CLINIC | Age: 79
End: 2024-01-19
Payer: MEDICARE

## 2024-01-19 VITALS
SYSTOLIC BLOOD PRESSURE: 91 MMHG | RESPIRATION RATE: 18 BRPM | TEMPERATURE: 98 F | WEIGHT: 206 LBS | OXYGEN SATURATION: 96 % | HEIGHT: 72 IN | DIASTOLIC BLOOD PRESSURE: 60 MMHG | BODY MASS INDEX: 27.9 KG/M2 | HEART RATE: 77 BPM

## 2024-01-19 DIAGNOSIS — J32.9 RHINOSINUSITIS: Primary | ICD-10-CM

## 2024-01-19 PROCEDURE — 99213 OFFICE O/P EST LOW 20 MIN: CPT | Mod: S$GLB,,, | Performed by: NURSE PRACTITIONER

## 2024-01-19 RX ORDER — MONTELUKAST SODIUM 10 MG/1
10 TABLET ORAL NIGHTLY
Qty: 30 TABLET | Refills: 0 | Status: SHIPPED | OUTPATIENT
Start: 2024-01-19 | End: 2024-02-18

## 2024-01-19 RX ORDER — LEVOCETIRIZINE DIHYDROCHLORIDE 5 MG/1
5 TABLET, FILM COATED ORAL NIGHTLY
Qty: 30 TABLET | Refills: 0 | Status: SHIPPED | OUTPATIENT
Start: 2024-01-19 | End: 2025-01-18

## 2024-01-19 NOTE — PROGRESS NOTES
Subjective:      Patient ID: Mao Pearce is a 78 y.o. male.    Vitals:  height is 6' (1.829 m) and weight is 93.4 kg (206 lb). His oral temperature is 98.3 °F (36.8 °C). His blood pressure is 91/60 and his pulse is 77. His respiration is 18 and oxygen saturation is 96%.     Chief Complaint: Nasal Congestion    Pt. Presents c.o. nasal congestion.Symps include a cough.Symps began Tuesday. Patient is returning from an previous visit, Patient claims that the steroid shot was not effective. Patient believes he needs antibiotics.    Sinus Problem  The current episode started in the past 7 days. The problem is unchanged. There has been no fever. His pain is at a severity of 0/10. He is experiencing no pain. Associated symptoms include congestion and sinus pressure. Pertinent negatives include no coughing, diaphoresis, ear pain, headaches, hoarse voice, neck pain, shortness of breath, sneezing, sore throat or swollen glands. Past treatments include nothing. The treatment provided no relief.       Constitution: Negative for sweating.   HENT:  Positive for congestion and sinus pressure. Negative for ear pain and sore throat.    Neck: Negative for neck pain.   Respiratory:  Negative for cough and shortness of breath.    Allergic/Immunologic: Negative for sneezing.   Neurological:  Negative for headaches.      Objective:     Physical Exam   HENT:   Head: Normocephalic.   Ears:   Right Ear: Tympanic membrane, external ear and ear canal normal.   Left Ear: Tympanic membrane, external ear and ear canal normal.   Nose: Nose normal. No rhinorrhea. Right sinus exhibits no maxillary sinus tenderness and no frontal sinus tenderness. Left sinus exhibits no maxillary sinus tenderness and no frontal sinus tenderness.   Mouth/Throat: Mucous membranes are moist. Oropharynx is clear.   Neck: Neck supple.   Pulmonary/Chest: Effort normal and breath sounds normal.   Abdominal: Normal appearance.   Neurological: He is alert.   Skin: Skin  is warm and dry.       Assessment:     1. Rhinosinusitis        Plan:       Rhinosinusitis  -     levocetirizine (XYZAL) 5 MG tablet; Take 1 tablet (5 mg total) by mouth every evening.  Dispense: 30 tablet; Refill: 0  -     montelukast (SINGULAIR) 10 mg tablet; Take 1 tablet (10 mg total) by mouth every evening.  Dispense: 30 tablet; Refill: 0      Patient Instructions   There are many treatment options for sinusitis, depending on your symptoms and how long youve had them. You can treat a sinus infection at home with:    Decongestants.  Over-the-counter (OTC) cold and allergy medications.  Nasal saline rinses.  Drinking plenty of fluids.  If symptoms of sinusitis dont improve after 10 days, a provider may prescribe:    Antibiotics.  Oral or topical decongestants.  Prescription intranasal steroid sprays. (Dont use nonprescription sprays or drops for longer than three to five days -- they may actually increase congestion.)  Providers treat chronic sinusitis by focusing on the underlying condition. Treatments can include:    Intranasal steroid sprays.  Topical antihistamine sprays or oral pills.  Leukotriene antagonists, like montelukast.  Surgery to treat structural issues, polyps or fungal infections

## 2024-07-05 ENCOUNTER — OFFICE VISIT (OUTPATIENT)
Dept: URGENT CARE | Facility: CLINIC | Age: 79
End: 2024-07-05
Payer: MEDICARE

## 2024-07-05 VITALS
BODY MASS INDEX: 27.9 KG/M2 | TEMPERATURE: 98 F | RESPIRATION RATE: 16 BRPM | WEIGHT: 206 LBS | HEIGHT: 72 IN | HEART RATE: 60 BPM | SYSTOLIC BLOOD PRESSURE: 131 MMHG | DIASTOLIC BLOOD PRESSURE: 86 MMHG | OXYGEN SATURATION: 98 %

## 2024-07-05 DIAGNOSIS — H61.23 BILATERAL IMPACTED CERUMEN: Primary | ICD-10-CM

## 2024-07-05 NOTE — PROCEDURES
"Ear Cerumen Removal    Date/Time: 7/5/2024 4:45 PM    Performed by: Dave Manuel NP  Authorized by: Dave Manuel NP    Time out: Immediately prior to procedure a "time out" was called to verify the correct patient, procedure, equipment, support staff and site/side marked as required.    Consent Done?:  Yes (Verbal)    Local anesthetic:  None  Medication Used:  Cerumenex  Location details:  Both ears  Procedure type: irrigation    Cerumen  Removal Results:  Cerumen completely removed  Patient tolerance:  Patient tolerated the procedure well with no immediate complications    "

## 2024-07-05 NOTE — PROGRESS NOTES
Subjective:      Patient ID: Mao Pearce is a 78 y.o. male.    Vitals:  height is 6' (1.829 m) and weight is 93.4 kg (206 lb). His temperature is 98.1 °F (36.7 °C). His blood pressure is 131/86 and his pulse is 60. His respiration is 16 and oxygen saturation is 98%.     Chief Complaint: Ear Fullness    Patient presents c.o. ear fullness in both ears.Symps include include loss of hearing.Symps began 2 days.    Ear Fullness   There is pain in both ears. The current episode started in the past 7 days. The problem occurs constantly. The problem has been gradually worsening. There has been no fever. The pain is at a severity of 0/10. The patient is experiencing no pain. Associated symptoms include hearing loss. Pertinent negatives include no abdominal pain, coughing, diarrhea, ear discharge, headaches, neck pain, rash, rhinorrhea, sore throat or vomiting. He has tried nothing for the symptoms. The treatment provided no relief. His past medical history is significant for hearing loss. There is no history of a chronic ear infection or a tympanostomy tube.       Constitution: Negative.   HENT:  Positive for hearing loss. Negative for ear discharge and sore throat.    Neck: Negative for neck pain.   Respiratory:  Negative for cough.    Gastrointestinal:  Negative for abdominal pain, vomiting and diarrhea.   Skin:  Negative for rash.   Neurological:  Negative for headaches.      Objective:     Physical Exam   Constitutional: No distress.   HENT:   Head: Normocephalic.   Ears:   Right Ear: impacted cerumen  Left Ear: impacted cerumen  Pulmonary/Chest: Effort normal.   Abdominal: Normal appearance.   Musculoskeletal: Normal range of motion.         General: Normal range of motion.   Neurological: He is alert.   Skin: Skin is warm and dry.       Assessment:     1. Bilateral impacted cerumen        Plan:       Bilateral impacted cerumen      Patient Instructions   Do not use cotton tipped swabs to clean inside your ears.  Sticking anything into the ears can push the ear wax in deeper and cause impactions.  You can dip a cotton ball in mineral oil and place it in your outer ear for 10 to 20 minutes once a week. This will help keep your ear wax soft. It may help prevent the ear wax from building up again. Do not push the cotton into the ear canal.  You may want to take medicine like ibuprofen, naproxen, or acetaminophen to help with pain.

## 2024-08-15 NOTE — PLAN OF CARE
Problem: Adult Inpatient Plan of Care  Goal: Plan of Care Review  Outcome: Ongoing, Progressing     Problem: Adult Inpatient Plan of Care  Goal: Patient-Specific Goal (Individualized)  Outcome: Ongoing, Progressing     Problem: Adult Inpatient Plan of Care  Goal: Absence of Hospital-Acquired Illness or Injury  Outcome: Ongoing, Progressing     Problem: Adult Inpatient Plan of Care  Goal: Readiness for Transition of Care  Outcome: Ongoing, Progressing     Problem: Skin Injury Risk Increased  Goal: Skin Health and Integrity  Outcome: Ongoing, Progressing     Problem: Fall Injury Risk  Goal: Absence of Fall and Fall-Related Injury  Outcome: Ongoing, Progressing      [Consultation] : a consultation [Sleep Evaluation] : sleep evaluation

## 2024-09-12 ENCOUNTER — OFFICE VISIT (OUTPATIENT)
Dept: URGENT CARE | Facility: CLINIC | Age: 79
End: 2024-09-12
Payer: MEDICARE

## 2024-09-12 VITALS
HEART RATE: 77 BPM | OXYGEN SATURATION: 97 % | HEIGHT: 72 IN | RESPIRATION RATE: 20 BRPM | TEMPERATURE: 98 F | WEIGHT: 200.63 LBS | DIASTOLIC BLOOD PRESSURE: 76 MMHG | SYSTOLIC BLOOD PRESSURE: 120 MMHG | BODY MASS INDEX: 27.17 KG/M2

## 2024-09-12 DIAGNOSIS — M79.676: Primary | ICD-10-CM

## 2024-09-12 PROCEDURE — 99213 OFFICE O/P EST LOW 20 MIN: CPT | Mod: S$GLB,,, | Performed by: FAMILY MEDICINE

## 2024-09-12 RX ORDER — IBUPROFEN 800 MG/1
800 TABLET ORAL 3 TIMES DAILY
Qty: 21 TABLET | Refills: 0 | Status: SHIPPED | OUTPATIENT
Start: 2024-09-12 | End: 2024-09-19

## 2024-09-12 NOTE — PROGRESS NOTES
Subjective:      Patient ID: Mao Pearce is a 79 y.o. male.    Vitals:  height is 6' (1.829 m) and weight is 91 kg (200 lb 9.9 oz). His oral temperature is 98.2 °F (36.8 °C). His blood pressure is 120/76 and his pulse is 77. His respiration is 20 and oxygen saturation is 97%.     Chief Complaint: Toe Pain and Foot Swelling    79 year old male c/o left 3rd and 4th toe pain. Pt states that he exercises. Pt states discomfort when walking on the toe with shoes.     Toe Pain   The incident occurred more than 1 week ago. There was no injury mechanism. The pain is present in the left foot (L middle toe). The quality of the pain is described as aching. The pain is at a severity of 8/10. The pain is moderate. The pain has been Fluctuating since onset. Pertinent negatives include no inability to bear weight, numbness or tingling. He reports no foreign bodies present. The symptoms are aggravated by weight bearing and movement. He has tried nothing for the symptoms. The treatment provided mild relief.     Musculoskeletal:  Positive for pain. Negative for trauma.   Neurological:  Negative for numbness.      Objective:     Vitals:    09/12/24 1548   BP: 120/76   BP Location: Left arm   Patient Position: Sitting   BP Method: Large (Automatic)   Pulse: 77   Resp: 20   Temp: 98.2 °F (36.8 °C)   TempSrc: Oral   SpO2: 97%   Weight: 91 kg (200 lb 9.9 oz)   Height: 6' (1.829 m)      Physical Exam   Musculoskeletal: Normal range of motion.         General: Swelling (left 3rd toe, no significant swelling of 4th left toe) and tenderness (pronouned left 3rd toe, no sarah tenderness of left 4th toe) present. Normal range of motion.      Comments: Peripheral pulses intact   Neurological: He is alert. No sensory deficit.   Psychiatric: Thought content normal.       Assessment:     1. Pain of soft tissue of multiple toes        Plan:       Pain of soft tissue of multiple toes  -     XR FOOT COMPLETE 3 VIEW LEFT; Future; Expected date:  09/12/2024  -     ibuprofen (ADVIL,MOTRIN) 800 MG tablet; Take 1 tablet (800 mg total) by mouth 3 (three) times daily. Take with meals. Stop if any GI bleeding. for 7 days  Dispense: 21 tablet; Refill: 0  -     Ambulatory referral/consult to Podiatry    Patient Instructions   If no improvement with ibuprofen see podiatry  Call to schedule an appointment: [phone]

## 2024-12-14 ENCOUNTER — OFFICE VISIT (OUTPATIENT)
Dept: URGENT CARE | Facility: CLINIC | Age: 79
End: 2024-12-14
Payer: MEDICARE

## 2024-12-14 VITALS
HEART RATE: 68 BPM | HEIGHT: 72 IN | DIASTOLIC BLOOD PRESSURE: 84 MMHG | WEIGHT: 200 LBS | OXYGEN SATURATION: 97 % | BODY MASS INDEX: 27.09 KG/M2 | RESPIRATION RATE: 16 BRPM | TEMPERATURE: 98 F | SYSTOLIC BLOOD PRESSURE: 124 MMHG

## 2024-12-14 DIAGNOSIS — S39.012A STRAIN OF LUMBAR PARASPINOUS MUSCLE, INITIAL ENCOUNTER: Primary | ICD-10-CM

## 2024-12-14 DIAGNOSIS — M62.830 SPASM OF MUSCLE OF LOWER BACK: ICD-10-CM

## 2024-12-14 PROCEDURE — 99213 OFFICE O/P EST LOW 20 MIN: CPT | Mod: S$GLB,,, | Performed by: NURSE PRACTITIONER

## 2024-12-14 RX ORDER — METHOCARBAMOL 500 MG/1
500 TABLET, FILM COATED ORAL 2 TIMES DAILY PRN
Qty: 15 TABLET | Refills: 0 | Status: SHIPPED | OUTPATIENT
Start: 2024-12-14

## 2024-12-14 NOTE — PATIENT INSTRUCTIONS
PLEASE READ YOUR DISCHARGE INSTRUCTIONS ENTIRELY AS IT CONTAINS IMPORTANT INFORMATION.      Please drink plenty of fluids.  Please get plenty of rest.  Ice to the area.   You may do gently stretching if tolerable.    Please return here or go to the Emergency Department for any concerns or worsening of condition.    If you were prescribed a narcotic medication or muscle relaxer (Robaxin), do not drive or operate heavy equipment or machinery while taking these medications. Take a half first to see how it affects you      If you were not prescribed an anti-inflammatory medication, and if you do not have any history of stomach/intestinal ulcers, or kidney disease, or are not taking a blood thinner such as Coumadin, Plavix, Pradaxa, Eloquis, or Xaralta for example, it is OK to take over the counter Ibuprofen or Advil or Motrin or Aleve as directed.  Do not take these medications on an empty stomach.    If you lose control of your bowel and/or bladder, please go to the nearest Emergency Department immediately.    If you lose sensation in between your legs by your genitalia and/or rectum, please go to the nearest Emergency Department immediately.    If you lose control or sensation of any extremity, please go to the nearest Emergency Department immediately.    Do not stay in one position to long.  When sleeping on your back place a pillow under knees to reduce tension on back.  If sleeping on your side, place pillow between knees to keep spine in better alinement.  Wear supportive shoes such as tennis shoes for support of the lower back.  Take any medication as directed.    Please follow up with your primary care doctor or specialist as needed.    If you  smoke, please stop smoking.      Please arrange follow up with your primary medical clinic as soon as possible. You must understand that you've received an Urgent Care treatment only and that you may be released before all of your medical problems are known or treated.  You, the patient, will arrange for follow up as instructed. If your symptoms worsen or fail to improve you should go to the Emergency Room.

## 2024-12-14 NOTE — PROGRESS NOTES
Subjective:      Patient ID: Mao Pearce is a 79 y.o. male.    Vitals:  height is 6' (1.829 m) and weight is 90.7 kg (200 lb). His tympanic temperature is 97.7 °F (36.5 °C). His blood pressure is 124/84 and his pulse is 68. His respiration is 16 and oxygen saturation is 97%.     Chief Complaint: Back Pain    Patient c/o back pain on left side. Patient states that he thinks that he may have a pulled muscled. Patient states that he been in the gym lately and he thinks it from lifting started yesterday, denies any trauma fall or injury, denies numbness or tingling, denies any radiating pain, denies any urinary symptoms, denies fever, body aches or chills, denies cough, wheezing or shortness of breath, denies nausea, vomiting, diarrhea or abdominal pain, denies chest pain or dizziness positional lightheadedness, denies sore throat or trouble swallowing, denies loss of taste or smell, or any other symptoms       Back Pain  This is a new problem. The current episode started in the past 7 days. The problem occurs constantly. The problem has been gradually worsening since onset. The quality of the pain is described as aching. The pain is at a severity of 7/10. The pain is moderate. The pain is The same all the time. The symptoms are aggravated by bending and twisting. Pertinent negatives include no bladder incontinence, bowel incontinence, chest pain, dysuria, fever, perianal numbness, tingling or weakness. He has tried nothing for the symptoms. The treatment provided no relief.       Constitution: Negative for fever.   Cardiovascular:  Negative for chest pain.   Gastrointestinal:  Negative for bowel incontinence.   Genitourinary:  Negative for dysuria and bladder incontinence.   Musculoskeletal:  Positive for back pain.      Objective:     Physical Exam   Constitutional: He is oriented to person, place, and time. He appears well-developed. He is cooperative. No distress.   HENT:   Head: Normocephalic and atraumatic.    Nose: Nose normal.   Mouth/Throat: Uvula is midline, oropharynx is clear and moist and mucous membranes are normal.   Eyes: Conjunctivae and lids are normal. Extraocular movement intact vision grossly intact gaze aligned appropriately   Neck: Trachea normal and phonation normal. Neck supple.   Cardiovascular: Normal rate, regular rhythm, normal heart sounds and normal pulses.   Pulmonary/Chest: Effort normal and breath sounds normal.   Abdominal: Normal appearance and bowel sounds are normal. He exhibits no mass. Soft.   Musculoskeletal:         General: No deformity.      Lumbar back: He exhibits tenderness and spasm. He exhibits normal range of motion.      Comments: No midline spine tenderness.  Full range of motion bilaterally with 5/5 strength  Able to ambulate with smooth rhythmic gait        Neurological: He is alert and oriented to person, place, and time. He has normal strength and normal reflexes. No sensory deficit.   Skin: Skin is warm, dry, intact and not diaphoretic.   Psychiatric: His speech is normal and behavior is normal. Judgment and thought content normal.   Nursing note and vitals reviewed.        Patient in no acute distress.  Vitals reassuring.  Discussed results/diagnosis/plan in depth with patient in clinic. Strict precautions given to patient to monitor for worsening signs and symptoms. Advised to follow up with primary.All questions answered. Strict ER precautions given. If your symptoms worsens or fail to improve you should go to the Emergency Room. Discharge and follow-up instructions given verbally/printed. Discharge and follow-up instructions discussed with the patient who expressed understanding and willingness to comply with my recommendations.Patient voiced understanding and in agreement with current treatment plan.     Please be advised this text was dictated with Free [employer] software and may contain errors due to translation.   Assessment:     1. Strain of lumbar paraspinous muscle,  initial encounter    2. Spasm of muscle of lower back        Plan:       Strain of lumbar paraspinous muscle, initial encounter  -     methocarbamoL (ROBAXIN) 500 MG Tab; Take 1 tablet (500 mg total) by mouth 2 (two) times daily as needed (muscle spasm).  Dispense: 15 tablet; Refill: 0    Spasm of muscle of lower back  -     methocarbamoL (ROBAXIN) 500 MG Tab; Take 1 tablet (500 mg total) by mouth 2 (two) times daily as needed (muscle spasm).  Dispense: 15 tablet; Refill: 0          Medical Decision Making:   Urgent Care Management:  Patient in no acute distress.  Vitals reassuring.  On exam, patient is nontoxic appearing and afebrile.  Lungs CTA.  Detailed education provided about muscle relaxer side effects and recommendations Medication prescribed and over-the-counter medication discussed with patient at length.  Proper hydration advised.  I reiterated the importance of further evaluation if no improvement symptoms and follow-up with primary. Patient voiced understanding and in agreement with current treatment plan.             Patient Instructions   PLEASE READ YOUR DISCHARGE INSTRUCTIONS ENTIRELY AS IT CONTAINS IMPORTANT INFORMATION.      Please drink plenty of fluids.  Please get plenty of rest.  Ice to the area.   You may do gently stretching if tolerable.    Please return here or go to the Emergency Department for any concerns or worsening of condition.    If you were prescribed a narcotic medication or muscle relaxer (Robaxin), do not drive or operate heavy equipment or machinery while taking these medications. Take a half first to see how it affects you      If you were not prescribed an anti-inflammatory medication, and if you do not have any history of stomach/intestinal ulcers, or kidney disease, or are not taking a blood thinner such as Coumadin, Plavix, Pradaxa, Eloquis, or Xaralta for example, it is OK to take over the counter Ibuprofen or Advil or Motrin or Aleve as directed.  Do not take these  medications on an empty stomach.    If you lose control of your bowel and/or bladder, please go to the nearest Emergency Department immediately.    If you lose sensation in between your legs by your genitalia and/or rectum, please go to the nearest Emergency Department immediately.    If you lose control or sensation of any extremity, please go to the nearest Emergency Department immediately.    Do not stay in one position to long.  When sleeping on your back place a pillow under knees to reduce tension on back.  If sleeping on your side, place pillow between knees to keep spine in better alinement.  Wear supportive shoes such as tennis shoes for support of the lower back.  Take any medication as directed.    Please follow up with your primary care doctor or specialist as needed.    If you  smoke, please stop smoking.      Please arrange follow up with your primary medical clinic as soon as possible. You must understand that you've received an Urgent Care treatment only and that you may be released before all of your medical problems are known or treated. You, the patient, will arrange for follow up as instructed. If your symptoms worsen or fail to improve you should go to the Emergency Room.

## 2025-01-08 ENCOUNTER — OFFICE VISIT (OUTPATIENT)
Dept: URGENT CARE | Facility: CLINIC | Age: 80
End: 2025-01-08
Payer: MEDICARE

## 2025-01-08 VITALS
DIASTOLIC BLOOD PRESSURE: 75 MMHG | SYSTOLIC BLOOD PRESSURE: 127 MMHG | TEMPERATURE: 98 F | WEIGHT: 199.94 LBS | HEIGHT: 72 IN | BODY MASS INDEX: 27.08 KG/M2 | RESPIRATION RATE: 19 BRPM | HEART RATE: 67 BPM | OXYGEN SATURATION: 97 %

## 2025-01-08 DIAGNOSIS — H61.23 EXCESSIVE CERUMEN IN BOTH EAR CANALS: ICD-10-CM

## 2025-01-08 DIAGNOSIS — R05.8 COUGH WITH CONGESTION OF PARANASAL SINUS: ICD-10-CM

## 2025-01-08 DIAGNOSIS — J06.9 VIRAL URI WITH COUGH: Primary | ICD-10-CM

## 2025-01-08 DIAGNOSIS — R09.81 COUGH WITH CONGESTION OF PARANASAL SINUS: ICD-10-CM

## 2025-01-08 PROCEDURE — 99213 OFFICE O/P EST LOW 20 MIN: CPT | Mod: S$GLB,,, | Performed by: PHYSICIAN ASSISTANT

## 2025-01-08 RX ORDER — AZELASTINE 1 MG/ML
1 SPRAY, METERED NASAL 2 TIMES DAILY PRN
Qty: 30 ML | Refills: 0 | Status: SHIPPED | OUTPATIENT
Start: 2025-01-08

## 2025-01-08 RX ORDER — BENZONATATE 100 MG/1
200 CAPSULE ORAL 3 TIMES DAILY PRN
Qty: 30 CAPSULE | Refills: 0 | Status: SHIPPED | OUTPATIENT
Start: 2025-01-08

## 2025-01-08 RX ORDER — CETIRIZINE HYDROCHLORIDE 10 MG/1
10 TABLET ORAL DAILY PRN
Qty: 30 TABLET | Refills: 0 | Status: SHIPPED | OUTPATIENT
Start: 2025-01-08 | End: 2026-01-08

## 2025-01-08 NOTE — PROGRESS NOTES
Subjective:      Patient ID: Mao Pearce is a 79 y.o. male.    Vitals:  height is 6' (1.829 m) and weight is 90.7 kg (199 lb 15.3 oz). His oral temperature is 97.7 °F (36.5 °C). His blood pressure is 127/75 and his pulse is 67. His respiration is 19 and oxygen saturation is 97%.     Chief Complaint: Sinus Problem    79-year-old male presents to urgent care clinic for evaluation.  Symptoms started yesterday with nasal/sinus congestion, mild productive cough, runny nose, postnasal drip, and intermittent hoarse voice.  Patient reports with taking vitamin C, drinking orange juice,  using Flonase nasal spray and NyQuil  with mild relief. Declined all testing since he gets this every year.     Sinus Problem  This is a new problem. The current episode started yesterday. The problem has been gradually worsening since onset. There has been no fever. His pain is at a severity of 0/10. He is experiencing no pain. Associated symptoms include congestion, coughing, a hoarse voice, sinus pressure, sneezing and a sore throat. Pertinent negatives include no chills, diaphoresis, ear pain, headaches, neck pain, shortness of breath or swollen glands.       Constitution: Positive for fatigue. Negative for activity change, chills, sweating, fever and generalized weakness.   HENT:  Positive for congestion, postnasal drip, sinus pain, sinus pressure, sore throat and voice change. Negative for ear pain, hearing loss, facial swelling and trouble swallowing.    Neck: Negative for neck pain, neck stiffness and painful lymph nodes.   Cardiovascular:  Negative for chest pain, leg swelling, palpitations, sob on exertion and passing out.   Eyes:  Negative for eye discharge, eye pain, eye redness, photophobia, vision loss, double vision, blurred vision and eyelid swelling.   Respiratory:  Positive for cough and sputum production. Negative for chest tightness, COPD, shortness of breath, wheezing and asthma.    Gastrointestinal:  Negative for  abdominal pain, nausea, vomiting, diarrhea, bright red blood in stool, dark colored stools, rectal bleeding, heartburn and bowel incontinence.   Genitourinary:  Negative for dysuria, frequency, urgency, urine decreased, flank pain, bladder incontinence, hematuria and history of kidney stones.   Musculoskeletal:  Negative for trauma, joint pain, joint swelling, abnormal ROM of joint, muscle cramps and muscle ache.   Skin:  Negative for color change, pale, rash and wound.   Allergic/Immunologic: Positive for sneezing. Negative for seasonal allergies, asthma and immunocompromised state.   Neurological:  Negative for dizziness, history of vertigo, light-headedness, passing out, facial drooping, speech difficulty, coordination disturbances, loss of balance, headaches, disorientation, altered mental status, loss of consciousness, numbness, tingling and seizures.   Hematologic/Lymphatic: Negative for swollen lymph nodes, easy bruising/bleeding and trouble clotting. Does not bruise/bleed easily.   Psychiatric/Behavioral:  Negative for altered mental status and disorientation.       Objective:     Physical Exam   Constitutional: He is oriented to person, place, and time. He appears well-developed. He does not appear ill. No distress.   HENT:   Head: Normocephalic.   Ears:   Right Ear: Hearing, tympanic membrane, external ear and ear canal normal. No no drainage, swelling or tenderness. No mastoid tenderness.   Left Ear: Hearing, tympanic membrane, external ear and ear canal normal. No no drainage, swelling or tenderness. No mastoid tenderness.      Comments: Excessive generalized in bilateral external canal but no impaction  Nose: Congestion present. No rhinorrhea. Right sinus exhibits no maxillary sinus tenderness and no frontal sinus tenderness. Left sinus exhibits no maxillary sinus tenderness and no frontal sinus tenderness.   Mouth/Throat: Oropharynx is clear and moist and mucous membranes are normal. Mucous membranes  are moist. No oral lesions. No oropharyngeal exudate, posterior oropharyngeal edema, posterior oropharyngeal erythema or tonsillar abscesses. No tonsillar exudate. Oropharynx is clear.   Eyes: Conjunctivae, EOM and lids are normal. Right eye exhibits no discharge. Left eye exhibits no discharge. Right conjunctiva is not injected. Right conjunctiva has no hemorrhage. Left conjunctiva is not injected. Left conjunctiva has no hemorrhage. vision grossly intact gaze aligned appropriately   Neck: Phonation normal. Neck supple.   Cardiovascular: Normal rate, regular rhythm, normal heart sounds and normal pulses.   Pulmonary/Chest: Effort normal and breath sounds normal. No accessory muscle usage. No respiratory distress. He has no wheezes.   Abdominal: Normal appearance. He exhibits no distension. Soft. There is no abdominal tenderness. There is no rebound and no guarding.   Musculoskeletal: Normal range of motion.         General: Normal range of motion.      Comments: Moves all extremities with normal tone, strength, and ROM.  Gait normal.   Lymphadenopathy:     He has no cervical adenopathy.   Neurological: no focal deficit. He is alert, oriented to person, place, and time and at baseline. He has normal motor skills and normal sensation. He displays facial symmetry and no dysarthria. Gait and coordination normal. GCS eye subscore is 4. GCS verbal subscore is 5. GCS motor subscore is 6.   Skin: Skin is warm, dry and no rash. Capillary refill takes less than 2 seconds.   Psychiatric: He experiences Normal attention. His speech is normal and behavior is normal. Thought content normal.   Nursing note and vitals reviewed.      Assessment:     1. Viral URI with cough    2. Cough with congestion of paranasal sinus    3. Excessive cerumen in both ear canals      Note dictated with voice recognition software, please excuse any grammatical errors.    Patient presents with clinical exam findings and history consistent with above.   We discussed the differential diagnosis.    On exam, patient is nontoxic appearing and vitals are stable.  Patient is essentially neurovascularly intact on exam.      Diagnostic testing results were independently reviewed and interpreted, which were discussed in depth with patient.   Test ordered in clinic:  Offered COVID and flu testing clinic but patient declined  Additionally, previous progress notes/admissions/lab were reviewed and interpreted.  CMP 01/24/2024 and 08/07/2023 with Good kidney function and EGFR.    We had shared medical decision making for patient's treatment and care.      Plan:     Emphasized importance of prescribed and OTC symptomatic treatment to prevent worsening of condition.  Offered cerumen cleaning in clinic but patient declined.    We had long discussion regarding steroid use.  It is not indicated at this time.  We also discussed the risk/alternatives/benefits/side effects of steroid use.  Patient disagreed and walked out of clinic.    Viral URI with cough  -     cetirizine (ZYRTEC) 10 MG tablet; Take 1 tablet (10 mg total) by mouth daily as needed for Allergies or Rhinitis.  Dispense: 30 tablet; Refill: 0  -     azelastine (ASTELIN) 137 mcg (0.1 %) nasal spray; 1 spray (137 mcg total) by Nasal route 2 (two) times daily as needed for Rhinitis.  Dispense: 30 mL; Refill: 0  -     benzonatate (TESSALON PERLES) 100 MG capsule; Take 2 capsules (200 mg total) by mouth 3 (three) times daily as needed for Cough.  Dispense: 30 capsule; Refill: 0    Cough with congestion of paranasal sinus  -     cetirizine (ZYRTEC) 10 MG tablet; Take 1 tablet (10 mg total) by mouth daily as needed for Allergies or Rhinitis.  Dispense: 30 tablet; Refill: 0  -     azelastine (ASTELIN) 137 mcg (0.1 %) nasal spray; 1 spray (137 mcg total) by Nasal route 2 (two) times daily as needed for Rhinitis.  Dispense: 30 mL; Refill: 0  -     benzonatate (TESSALON PERLES) 100 MG capsule; Take 2 capsules (200 mg total) by mouth  3 (three) times daily as needed for Cough.  Dispense: 30 capsule; Refill: 0    Excessive cerumen in both ear canals             Additional MDM:     Heart Failure Score:   COPD = No

## 2025-02-24 ENCOUNTER — TELEPHONE (OUTPATIENT)
Dept: HEMATOLOGY/ONCOLOGY | Facility: CLINIC | Age: 80
End: 2025-02-24
Payer: MEDICARE

## 2025-02-24 NOTE — TELEPHONE ENCOUNTER
Called and spoke to pt.  Let him know we have not received a referral fro Dr Wheat.  I sent a fax to Dr Wheat and staff and gave them my fax number.  I asked them to fax over pts referral.

## 2025-02-24 NOTE — TELEPHONE ENCOUNTER
----- Message from XIOMARA Clarke sent at 2/21/2025 10:11 AM CST -----  Regarding: FW: Consult/Advisory  Contact: [phone]    ----- Message -----  From: Vince Palacio RN  Sent: 2/20/2025   5:09 PM CST  To: Vince Palacio RN  Subject: FW: Consult/Advisory                               ----- Message -----  From: Murali Dent RN  Sent: 2/20/2025   4:30 PM CST  To: [facility] Navigation  Subject: FW: Consult/Advisory                               ----- Message -----  From: Anup Mejia  Sent: 2/20/2025   4:14 PM CST  To: [facility]UNE[facility] Staff  Subject: Consult/Advisory                                 Consult/Advisory Name Of Caller: Mao Pearce   Contact Preference:  [phone] Nature of call: Pt is calling to see if his referral was received.

## 2025-02-26 ENCOUNTER — TELEPHONE (OUTPATIENT)
Dept: HEMATOLOGY/ONCOLOGY | Facility: CLINIC | Age: 80
End: 2025-02-26
Payer: MEDICARE

## 2025-02-26 NOTE — TELEPHONE ENCOUNTER
Called and spoke to Patient .  Appointment rescheduled to 3/12.  All questions and concerns addressed.   Provided my name and direct number and instructed Patient  to call with any questions and/or concerns.       SOLA EstradaN, RN-BC  BMT Nurse Navigator  [facility] [address], [place], [place] [address]  _________________________  o [phone]

## 2025-02-26 NOTE — TELEPHONE ENCOUNTER
----- Message from XIOMARA Clarke sent at 2/21/2025 10:11 AM CST -----  Regarding: FW: Consult/Advisory  Contact: [phone]    ----- Message -----  From: Vince Palacio RN  Sent: 2/20/2025   5:09 PM CST  To: Vince Palacio RN  Subject: FW: Consult/Advisory                               ----- Message -----  From: Murali Dent RN  Sent: 2/20/2025   4:30 PM CST  To: [facility] Navigation  Subject: FW: Consult/Advisory                               ----- Message -----  From: Anup Mejia  Sent: 2/20/2025   4:14 PM CST  To: [facility] Staff  Subject: Consult/Advisory                                 Consult/Advisory Name Of Caller: Mao Pearce   Contact Preference:  [phone] Nature of call: Pt is calling to see if his referral was received.

## 2025-03-10 ENCOUNTER — OFFICE VISIT (OUTPATIENT)
Dept: HEMATOLOGY/ONCOLOGY | Facility: CLINIC | Age: 80
End: 2025-03-10
Payer: MEDICARE

## 2025-03-10 ENCOUNTER — TELEPHONE (OUTPATIENT)
Dept: HEMATOLOGY/ONCOLOGY | Facility: CLINIC | Age: 80
End: 2025-03-10
Payer: MEDICARE

## 2025-03-10 ENCOUNTER — LAB VISIT (OUTPATIENT)
Dept: LAB | Facility: HOSPITAL | Age: 80
End: 2025-03-10
Payer: MEDICARE

## 2025-03-10 VITALS
BODY MASS INDEX: 28.17 KG/M2 | SYSTOLIC BLOOD PRESSURE: 122 MMHG | TEMPERATURE: 98 F | OXYGEN SATURATION: 97 % | WEIGHT: 208 LBS | DIASTOLIC BLOOD PRESSURE: 66 MMHG | HEIGHT: 72 IN | HEART RATE: 63 BPM

## 2025-03-10 DIAGNOSIS — D75.81 MYELOFIBROSIS: ICD-10-CM

## 2025-03-10 DIAGNOSIS — D69.6 THROMBOCYTOPENIA: ICD-10-CM

## 2025-03-10 DIAGNOSIS — D69.6 THROMBOCYTOPENIA: Primary | ICD-10-CM

## 2025-03-10 PROBLEM — R53.81 DEBILITY: Status: RESOLVED | Noted: 2023-07-28 | Resolved: 2025-03-10

## 2025-03-10 LAB
ALBUMIN SERPL BCP-MCNC: 3.8 G/DL (ref 3.5–5.2)
ALP SERPL-CCNC: 68 U/L (ref 40–150)
ALT SERPL W/O P-5'-P-CCNC: 17 U/L (ref 10–44)
ANION GAP SERPL CALC-SCNC: 8 MMOL/L (ref 8–16)
AST SERPL-CCNC: 26 U/L (ref 10–40)
BASOPHILS # BLD AUTO: 0.01 K/UL (ref 0–0.2)
BASOPHILS NFR BLD: 0.2 % (ref 0–1.9)
BILIRUB SERPL-MCNC: 0.9 MG/DL (ref 0.1–1)
BUN SERPL-MCNC: 14 MG/DL (ref 8–23)
CALCIUM SERPL-MCNC: 9.2 MG/DL (ref 8.7–10.5)
CHLORIDE SERPL-SCNC: 103 MMOL/L (ref 95–110)
CO2 SERPL-SCNC: 28 MMOL/L (ref 23–29)
CREAT SERPL-MCNC: 0.7 MG/DL (ref 0.5–1.4)
DAT IGG-SP REAG RBC-IMP: NORMAL
DIFFERENTIAL METHOD BLD: ABNORMAL
EOSINOPHIL # BLD AUTO: 0.1 K/UL (ref 0–0.5)
EOSINOPHIL NFR BLD: 2.3 % (ref 0–8)
ERYTHROCYTE [DISTWIDTH] IN BLOOD BY AUTOMATED COUNT: 12.2 % (ref 11.5–14.5)
EST. GFR  (NO RACE VARIABLE): >60 ML/MIN/1.73 M^2
FERRITIN SERPL-MCNC: 238 NG/ML (ref 20–300)
FOLATE SERPL-MCNC: 8.2 NG/ML (ref 4–24)
GLUCOSE SERPL-MCNC: 97 MG/DL (ref 70–110)
HAPTOGLOB SERPL-MCNC: 106 MG/DL (ref 30–250)
HCT VFR BLD AUTO: 44.3 % (ref 40–54)
HGB BLD-MCNC: 15.2 G/DL (ref 14–18)
IMM GRANULOCYTES # BLD AUTO: 0.04 K/UL (ref 0–0.04)
IMM GRANULOCYTES NFR BLD AUTO: 0.9 % (ref 0–0.5)
IRON SERPL-MCNC: 135 UG/DL (ref 45–160)
LDH SERPL L TO P-CCNC: 175 U/L (ref 110–260)
LYMPHOCYTES # BLD AUTO: 1.4 K/UL (ref 1–4.8)
LYMPHOCYTES NFR BLD: 28.7 % (ref 18–48)
MCH RBC QN AUTO: 31.7 PG (ref 27–31)
MCHC RBC AUTO-ENTMCNC: 34.3 G/DL (ref 32–36)
MCV RBC AUTO: 93 FL (ref 82–98)
MONOCYTES # BLD AUTO: 0.4 K/UL (ref 0.3–1)
MONOCYTES NFR BLD: 9.1 % (ref 4–15)
NEUTROPHILS # BLD AUTO: 2.8 K/UL (ref 1.8–7.7)
NEUTROPHILS NFR BLD: 58.8 % (ref 38–73)
NRBC BLD-RTO: 0 /100 WBC
PLATELET # BLD AUTO: 82 K/UL (ref 150–450)
PMV BLD AUTO: 11.5 FL (ref 9.2–12.9)
POTASSIUM SERPL-SCNC: 4.2 MMOL/L (ref 3.5–5.1)
PROT SERPL-MCNC: 6.7 G/DL (ref 6–8.4)
RBC # BLD AUTO: 4.79 M/UL (ref 4.6–6.2)
RETICS/RBC NFR AUTO: 1.2 % (ref 0.4–2)
SATURATED IRON: 36 % (ref 20–50)
SODIUM SERPL-SCNC: 139 MMOL/L (ref 136–145)
TOTAL IRON BINDING CAPACITY: 373 UG/DL (ref 250–450)
TRANSFERRIN SERPL-MCNC: 252 MG/DL (ref 200–375)
VIT B12 SERPL-MCNC: <148 PG/ML (ref 210–950)
WBC # BLD AUTO: 4.7 K/UL (ref 3.9–12.7)

## 2025-03-10 PROCEDURE — 83615 LACTATE (LD) (LDH) ENZYME: CPT | Performed by: INTERNAL MEDICINE

## 2025-03-10 PROCEDURE — 1126F AMNT PAIN NOTED NONE PRSNT: CPT | Mod: CPTII,S$GLB,, | Performed by: INTERNAL MEDICINE

## 2025-03-10 PROCEDURE — 3078F DIAST BP <80 MM HG: CPT | Mod: CPTII,S$GLB,, | Performed by: INTERNAL MEDICINE

## 2025-03-10 PROCEDURE — 1159F MED LIST DOCD IN RCRD: CPT | Mod: CPTII,S$GLB,, | Performed by: INTERNAL MEDICINE

## 2025-03-10 PROCEDURE — 86880 COOMBS TEST DIRECT: CPT | Performed by: INTERNAL MEDICINE

## 2025-03-10 PROCEDURE — 99999 PR PBB SHADOW E&M-EST. PATIENT-LVL IV: CPT | Mod: PBBFAC,,, | Performed by: INTERNAL MEDICINE

## 2025-03-10 PROCEDURE — 82607 VITAMIN B-12: CPT | Performed by: INTERNAL MEDICINE

## 2025-03-10 PROCEDURE — 80053 COMPREHEN METABOLIC PANEL: CPT | Performed by: INTERNAL MEDICINE

## 2025-03-10 PROCEDURE — 84466 ASSAY OF TRANSFERRIN: CPT | Performed by: INTERNAL MEDICINE

## 2025-03-10 PROCEDURE — 82728 ASSAY OF FERRITIN: CPT | Performed by: INTERNAL MEDICINE

## 2025-03-10 PROCEDURE — 1160F RVW MEDS BY RX/DR IN RCRD: CPT | Mod: CPTII,S$GLB,, | Performed by: INTERNAL MEDICINE

## 2025-03-10 PROCEDURE — 83921 ORGANIC ACID SINGLE QUANT: CPT | Performed by: INTERNAL MEDICINE

## 2025-03-10 PROCEDURE — 99205 OFFICE O/P NEW HI 60 MIN: CPT | Mod: S$GLB,,, | Performed by: INTERNAL MEDICINE

## 2025-03-10 PROCEDURE — 85045 AUTOMATED RETICULOCYTE COUNT: CPT | Performed by: INTERNAL MEDICINE

## 2025-03-10 PROCEDURE — 82746 ASSAY OF FOLIC ACID SERUM: CPT | Performed by: INTERNAL MEDICINE

## 2025-03-10 PROCEDURE — 3074F SYST BP LT 130 MM HG: CPT | Mod: CPTII,S$GLB,, | Performed by: INTERNAL MEDICINE

## 2025-03-10 PROCEDURE — 85025 COMPLETE CBC W/AUTO DIFF WBC: CPT | Performed by: INTERNAL MEDICINE

## 2025-03-10 PROCEDURE — 83010 ASSAY OF HAPTOGLOBIN QUANT: CPT | Performed by: INTERNAL MEDICINE

## 2025-03-10 PROCEDURE — 82525 ASSAY OF COPPER: CPT | Performed by: INTERNAL MEDICINE

## 2025-03-10 NOTE — TELEPHONE ENCOUNTER
Called and spoke to Patient .  Appointment rescheduled to today 3/10.  All questions and concerns addressed.   Provided my name and direct number and instructed Patient  to call with any questions and/or concerns.       SOLA EstradaN, RN-BC  BMT Nurse Navigator  [facility] [address], [place], [place] [address]  _________________________  o [phone]

## 2025-03-10 NOTE — PROGRESS NOTES
Section of Hematology and Stem Cell Transplantation  New Patient Consult     Referred by:  Aaareferral Self  Date of visit: 3/10/25  Reason for visit: Thrombocytopenia [D69.6]    History of Present Ilness:   Mao Pearce (Jaime) is a pleasant 79 y.o.male who presents for initial consultation regarding thrombocytopenia. He reports he has had thrombocytopenia for the past 10-15 years with a platelet count in the 80s-90s. He has been asymptomatic. He denies recent fevers, chills, night sweats, unexpected weight loss. He has had multiple prior surgeries without significant bleeding. Denies easy bruising. No new medications. Denies recent infections.       PAST MEDICAL HISTORY:   Past Medical History:   Diagnosis Date    BPH (benign prostatic hyperplasia)     Hair loss     Hyperlipidemia     Osteoarthritis        PAST SURGICAL HISTORY:   Past Surgical History:   Procedure Laterality Date    HIP REPLACEMENT ARTHROPLASTY Left 5/7/2020    Procedure: ARTHROPLASTY, HIP REPLACEMENT;  Surgeon: Celestine Cox MD;  Location: [facility];  Service: Orthopedics;  Laterality: Left;    KNEE ARTHROSCOPY      TONSILLECTOMY      TOTAL KNEE ARTHROPLASTY Bilateral 7/25/2023    Procedure: ARTHROPLASTY, KNEE, TOTAL REPLACEMENT;  Surgeon: Celestine Cox MD;  Location: [facility];  Service: Orthopedics;  Laterality: Bilateral;  ADDUCTOR BLOCK       PAST SOCIAL HISTORY:  Social History[1]    FAMILY HISTORY:  Family History   Adopted: Yes   Problem Relation Name Age of Onset    No Known Problems Mother      No Known Problems Father         CURRENT MEDICATIONS:   Current Outpatient Medications   Medication Sig    acetaminophen (TYLENOL) 325 MG tablet Take 2 tablets (650 mg total) by mouth every 6 (six) hours as needed for Pain.    ALPRAZolam (XANAX) 1 MG tablet TK 1 T PO QHS PRN    atorvastatin (LIPITOR) 40 MG tablet Take 40 mg by mouth every evening.    azelastine (ASTELIN) 137 mcg (0.1 %) nasal spray 1 spray (137 mcg total) by Nasal route  2 (two) times daily as needed for Rhinitis.    benzonatate (TESSALON PERLES) 100 MG capsule Take 2 capsules (200 mg total) by mouth 3 (three) times daily as needed for Cough.    cetirizine (ZYRTEC) 10 MG tablet Take 1 tablet (10 mg total) by mouth daily as needed for Allergies or Rhinitis.    finasteride (PROSCAR) 5 mg tablet TK 1/2 T PO QD    HYDROcodone-acetaminophen (NORCO) 5-325 mg per tablet Take 1 tablet by mouth every 6 (six) hours as needed for Pain.    levocetirizine (XYZAL) 5 MG tablet Take 1 tablet (5 mg total) by mouth every evening.    methocarbamoL (ROBAXIN) 500 MG Tab Take 1 tablet (500 mg total) by mouth 2 (two) times daily as needed (muscle spasm).    pantoprazole (PROTONIX) 40 MG tablet     tamsulosin (FLOMAX) 0.4 mg Cap Take 1 capsule (0.4 mg total) by mouth every evening.    aspirin 81 MG Chew Take 1 tablet (81 mg total) by mouth 2 (two) times daily. for 16 days (Patient not taking: Reported on 3/10/2025)     No current facility-administered medications for this visit.       ALLERGIES:   Review of patient's allergies indicates:   Allergen Reactions    Ciprofloxacin Rash    Metronidazole Rash       Review of Systems:     Pertinent positives and negatives included in the HPI. Otherwise a complete review of systems is negative.    Physical Exam:     Vitals:    03/10/25 1234   BP: 122/66   Pulse: 63   Temp: 97.7 °F (36.5 °C)     General: Appears well, NAD  HEENT: MMM, no OP lesions  Neck: Supple, no LAD  Pulmonary: CTAB, no increased work of breathing, no W/R/C  Cardiovascular: S1S2 normal, RRR, no M/R/G  Abdominal: Soft, NT, ND, BS+, no HSM  Extremities: No C/C/E  Neurological: AAOx4, grossly normal, no focal deficits  Dermatologic: No appreciable rashes or lesions    Labs:   Lab Results   Component Value Date    WBC 4.70 03/10/2025    HGB 15.2 03/10/2025    HCT 44.3 03/10/2025    MCV 93 03/10/2025    PLT 82 (L) 03/10/2025       Lab Results   Component Value Date     03/10/2025    K 4.2  "03/10/2025     03/10/2025    CO2 28 03/10/2025    BUN 14 03/10/2025    CREATININE 0.7 03/10/2025    ALBUMIN 3.8 03/10/2025    BILITOT 0.9 03/10/2025    ALKPHOS 68 03/10/2025    AST 26 03/10/2025    ALT 17 03/10/2025       Lab Results   Component Value Date    IRON 135 03/10/2025    TIBC 373 03/10/2025    FERRITIN 238 03/10/2025       No components found for: "RETICULOCYTES"    Lab Results   Component Value Date    HAPTOGLOBIN 106 03/10/2025     03/10/2025       Imaging:   Reviewed       Assessment and Plan:   Mao Pearce (Jaime) is a pleasant 79 y.o.male with thrombocytopenia who presents for initial evaluation.     Thrombocytopenia: He has had chronic thrombocytopenia without any other CBC abnormalities. He is asymptomatic. Labs today seem similar to prior labs. Will follow up nutritional studies. Will obtain US abdomen to assess liver/spleen. Will also send MPN workup. If all negative, will consider bone marrow biopsy. He was in agreement with this plan    Follow up: 1 month    Orders/Follow Up:      Orders Placed This Encounter    US Abdomen Complete    BCR/ABL, RNA-QUAL, DIAGNOSTIC w/REFLEX, BLOOD    MPN (JAK2 V617F)WITH REFLEX TO CALR,MPL    JAK2 Exon 12 And Other Non-V617 Mutation Detection, Bld     Route Chart for Scheduling    BMT Chart Routing      Follow up with physician 1 month.   Follow up with DARBY    Provider visit type    Infusion scheduling note    Injection scheduling note    Labs CBC, CMP, phosphorus and magnesium   Scheduling:  Preferred lab:  Lab interval:  prior to visit   Imaging    Pharmacy appointment    Other referrals                       Total time of this visit was 60 minutes, including time spent face to face with patient and/or via video/audio, and also in preparing for today's visit for MDM and documentation. (Medical Decision Making, including consideration of possible diagnoses, management options, complex medical record review, review of diagnostic tests and " information, consideration and discussion of significant complications based on comorbidities, and discussion with providers involved with the care of the patient). Greater than 50% was spent face to face with the patient counseling and coordinating care.    John Colbert MD  Malignant Hematology, Stem Cell Transplant, and Cellular Therapy  [facility]  [facility]         [1]   Social History  Tobacco Use    Smoking status: Former     Current packs/day: 0.00     Types: Cigarettes     Quit date: 1986     Years since quittin.2     Passive exposure: Past    Smokeless tobacco: Never   Substance Use Topics    Alcohol use: Yes     Comment: none 24 hr prior to surgery    Drug use: Not Currently

## 2025-03-11 ENCOUNTER — HOSPITAL ENCOUNTER (OUTPATIENT)
Dept: RADIOLOGY | Facility: HOSPITAL | Age: 80
Discharge: HOME | End: 2025-03-11
Attending: INTERNAL MEDICINE
Payer: MEDICARE

## 2025-03-11 DIAGNOSIS — D69.6 THROMBOCYTOPENIA: ICD-10-CM

## 2025-03-11 PROCEDURE — 76700 US EXAM ABDOM COMPLETE: CPT | Mod: 26,,, | Performed by: RADIOLOGY

## 2025-03-11 PROCEDURE — 76700 US EXAM ABDOM COMPLETE: CPT | Mod: TC

## 2025-03-12 DIAGNOSIS — D51.9 ANEMIA DUE TO VITAMIN B12 DEFICIENCY, UNSPECIFIED B12 DEFICIENCY TYPE: Primary | ICD-10-CM

## 2025-03-12 RX ORDER — CYANOCOBALAMIN 1000 UG/ML
1000 INJECTION, SOLUTION INTRAMUSCULAR; SUBCUTANEOUS
Qty: 30 ML | Refills: 3 | Status: SHIPPED | OUTPATIENT
Start: 2025-03-12

## 2025-03-15 LAB
COPPER SERPL-MCNC: 892 UG/L (ref 665–1480)
METHYLMALONATE SERPL-SCNC: 0.74 UMOL/L

## 2025-04-14 ENCOUNTER — LAB VISIT (OUTPATIENT)
Dept: LAB | Facility: HOSPITAL | Age: 80
End: 2025-04-14
Attending: INTERNAL MEDICINE
Payer: MEDICARE

## 2025-04-14 DIAGNOSIS — D69.6 THROMBOCYTOPENIA: ICD-10-CM

## 2025-04-14 DIAGNOSIS — D51.9 ANEMIA DUE TO VITAMIN B12 DEFICIENCY, UNSPECIFIED B12 DEFICIENCY TYPE: ICD-10-CM

## 2025-04-14 DIAGNOSIS — D51.9 ANEMIA DUE TO VITAMIN B12 DEFICIENCY, UNSPECIFIED B12 DEFICIENCY TYPE: Primary | ICD-10-CM

## 2025-04-14 LAB
ABSOLUTE EOSINOPHIL (OHS): 0.09 K/UL
ABSOLUTE MONOCYTE (OHS): 0.4 K/UL (ref 0.3–1)
ABSOLUTE NEUTROPHIL COUNT (OHS): 2.59 K/UL (ref 1.8–7.7)
ALBUMIN SERPL BCP-MCNC: 3.7 G/DL (ref 3.5–5.2)
ALP SERPL-CCNC: 70 UNIT/L (ref 40–150)
ALT SERPL W/O P-5'-P-CCNC: 41 UNIT/L (ref 10–44)
ANION GAP (OHS): 7 MMOL/L (ref 8–16)
AST SERPL-CCNC: 29 UNIT/L (ref 11–45)
BASOPHILS # BLD AUTO: 0.01 K/UL
BASOPHILS NFR BLD AUTO: 0.2 %
BILIRUB SERPL-MCNC: 0.9 MG/DL (ref 0.1–1)
BUN SERPL-MCNC: 13 MG/DL (ref 8–23)
CALCIUM SERPL-MCNC: 9.1 MG/DL (ref 8.7–10.5)
CHLORIDE SERPL-SCNC: 106 MMOL/L (ref 95–110)
CO2 SERPL-SCNC: 27 MMOL/L (ref 23–29)
CREAT SERPL-MCNC: 0.8 MG/DL (ref 0.5–1.4)
ERYTHROCYTE [DISTWIDTH] IN BLOOD BY AUTOMATED COUNT: 12.5 % (ref 11.5–14.5)
GFR SERPLBLD CREATININE-BSD FMLA CKD-EPI: >60 ML/MIN/1.73/M2
GLUCOSE SERPL-MCNC: 105 MG/DL (ref 70–110)
HCT VFR BLD AUTO: 44.2 % (ref 40–54)
HGB BLD-MCNC: 14.8 GM/DL (ref 14–18)
IMM GRANULOCYTES # BLD AUTO: 0.01 K/UL (ref 0–0.04)
IMM GRANULOCYTES NFR BLD AUTO: 0.2 % (ref 0–0.5)
LYMPHOCYTES # BLD AUTO: 1.66 K/UL (ref 1–4.8)
MAGNESIUM SERPL-MCNC: 1.9 MG/DL (ref 1.6–2.6)
MCH RBC QN AUTO: 31.3 PG (ref 27–31)
MCHC RBC AUTO-ENTMCNC: 33.5 G/DL (ref 32–36)
MCV RBC AUTO: 93 FL (ref 82–98)
NUCLEATED RBC (/100WBC) (OHS): 0 /100 WBC
PHOSPHATE SERPL-MCNC: 3.2 MG/DL (ref 2.7–4.5)
PLATELET # BLD AUTO: 87 K/UL (ref 150–450)
PMV BLD AUTO: 11.7 FL (ref 9.2–12.9)
POTASSIUM SERPL-SCNC: 4.2 MMOL/L (ref 3.5–5.1)
PROT SERPL-MCNC: 6.7 GM/DL (ref 6–8.4)
RBC # BLD AUTO: 4.73 M/UL (ref 4.6–6.2)
RELATIVE EOSINOPHIL (OHS): 1.9 %
RELATIVE LYMPHOCYTE (OHS): 34.9 % (ref 18–48)
RELATIVE MONOCYTE (OHS): 8.4 % (ref 4–15)
RELATIVE NEUTROPHIL (OHS): 54.4 % (ref 38–73)
SODIUM SERPL-SCNC: 140 MMOL/L (ref 136–145)
WBC # BLD AUTO: 4.76 K/UL (ref 3.9–12.7)

## 2025-04-14 PROCEDURE — 83735 ASSAY OF MAGNESIUM: CPT

## 2025-04-14 PROCEDURE — 84100 ASSAY OF PHOSPHORUS: CPT

## 2025-04-14 PROCEDURE — 80053 COMPREHEN METABOLIC PANEL: CPT

## 2025-04-14 PROCEDURE — 36415 COLL VENOUS BLD VENIPUNCTURE: CPT

## 2025-04-14 PROCEDURE — 85025 COMPLETE CBC W/AUTO DIFF WBC: CPT

## 2025-05-07 NOTE — PLAN OF CARE
Problem: Physical Therapy  Goal: Physical Therapy Goal  Description: Goals to be met by: 23     Patient will increase functional independence with mobility by performin. Supine to sit with Adin.  2. Sit to supine with Adin.  3. Rolling to Left and Right with Adin.  4. Sit to stand transfer with Modified Adin.  5. Bed to chair transfer with Modified Adin using Rolling Walker.  6. Gait x 150 feet with Modified Adin using Rolling Walker.  7. Wheelchair propulsion x 150 feet with Modified Adin using bilateral upper extremities.    Outcome: Ongoing, Progressing      15

## 2025-05-08 ENCOUNTER — TELEPHONE (OUTPATIENT)
Dept: HEMATOLOGY/ONCOLOGY | Facility: CLINIC | Age: 80
End: 2025-05-08
Payer: MEDICARE

## 2025-05-12 ENCOUNTER — TELEPHONE (OUTPATIENT)
Dept: HEMATOLOGY/ONCOLOGY | Facility: CLINIC | Age: 80
End: 2025-05-12
Payer: MEDICARE

## 2025-05-12 ENCOUNTER — LAB VISIT (OUTPATIENT)
Dept: LAB | Facility: HOSPITAL | Age: 80
End: 2025-05-12
Attending: INTERNAL MEDICINE
Payer: MEDICARE

## 2025-05-12 DIAGNOSIS — D51.9 ANEMIA DUE TO VITAMIN B12 DEFICIENCY, UNSPECIFIED B12 DEFICIENCY TYPE: ICD-10-CM

## 2025-05-12 DIAGNOSIS — D69.6 THROMBOCYTOPENIA: ICD-10-CM

## 2025-05-12 LAB
ALBUMIN SERPL BCP-MCNC: 3.8 G/DL (ref 3.5–5.2)
ALP SERPL-CCNC: 76 UNIT/L (ref 40–150)
ALT SERPL W/O P-5'-P-CCNC: 16 UNIT/L (ref 10–44)
ANION GAP (OHS): 9 MMOL/L (ref 8–16)
AST SERPL-CCNC: 21 UNIT/L (ref 11–45)
BILIRUB SERPL-MCNC: 0.9 MG/DL (ref 0.1–1)
BUN SERPL-MCNC: 17 MG/DL (ref 8–23)
CALCIUM SERPL-MCNC: 9.4 MG/DL (ref 8.7–10.5)
CHLORIDE SERPL-SCNC: 105 MMOL/L (ref 95–110)
CO2 SERPL-SCNC: 28 MMOL/L (ref 23–29)
CREAT SERPL-MCNC: 0.7 MG/DL (ref 0.5–1.4)
GFR SERPLBLD CREATININE-BSD FMLA CKD-EPI: >60 ML/MIN/1.73/M2
GLUCOSE SERPL-MCNC: 106 MG/DL (ref 70–110)
POTASSIUM SERPL-SCNC: 3.9 MMOL/L (ref 3.5–5.1)
PROT SERPL-MCNC: 7 GM/DL (ref 6–8.4)
SODIUM SERPL-SCNC: 142 MMOL/L (ref 136–145)
VIT B12 SERPL-MCNC: 556 PG/ML (ref 210–950)

## 2025-05-12 PROCEDURE — 36415 COLL VENOUS BLD VENIPUNCTURE: CPT

## 2025-05-12 PROCEDURE — 82040 ASSAY OF SERUM ALBUMIN: CPT

## 2025-05-12 PROCEDURE — 82607 VITAMIN B-12: CPT

## 2025-06-10 ENCOUNTER — TELEPHONE (OUTPATIENT)
Dept: HEMATOLOGY/ONCOLOGY | Facility: CLINIC | Age: 80
End: 2025-06-10
Payer: MEDICARE

## 2025-08-04 ENCOUNTER — OFFICE VISIT (OUTPATIENT)
Dept: HEMATOLOGY/ONCOLOGY | Facility: CLINIC | Age: 80
End: 2025-08-04
Payer: MEDICARE

## 2025-08-04 ENCOUNTER — LAB VISIT (OUTPATIENT)
Dept: LAB | Facility: HOSPITAL | Age: 80
End: 2025-08-04
Payer: MEDICARE

## 2025-08-04 VITALS
DIASTOLIC BLOOD PRESSURE: 84 MMHG | SYSTOLIC BLOOD PRESSURE: 138 MMHG | HEART RATE: 57 BPM | HEIGHT: 72 IN | OXYGEN SATURATION: 99 % | BODY MASS INDEX: 27.09 KG/M2 | WEIGHT: 200 LBS | TEMPERATURE: 98 F

## 2025-08-04 DIAGNOSIS — D69.6 THROMBOCYTOPENIA: Primary | ICD-10-CM

## 2025-08-04 DIAGNOSIS — D51.9 ANEMIA DUE TO VITAMIN B12 DEFICIENCY, UNSPECIFIED B12 DEFICIENCY TYPE: ICD-10-CM

## 2025-08-04 DIAGNOSIS — D69.6 THROMBOCYTOPENIA: ICD-10-CM

## 2025-08-04 DIAGNOSIS — E53.8 B12 DEFICIENCY: ICD-10-CM

## 2025-08-04 LAB
ABSOLUTE EOSINOPHIL (OHS): 0.15 K/UL
ABSOLUTE MONOCYTE (OHS): 0.37 K/UL (ref 0.3–1)
ABSOLUTE NEUTROPHIL COUNT (OHS): 2.31 K/UL (ref 1.8–7.7)
ALBUMIN SERPL BCP-MCNC: 4 G/DL (ref 3.5–5.2)
ALP SERPL-CCNC: 54 UNIT/L (ref 40–150)
ALT SERPL W/O P-5'-P-CCNC: 25 UNIT/L (ref 0–55)
ANION GAP (OHS): 7 MMOL/L (ref 8–16)
AST SERPL-CCNC: 30 UNIT/L (ref 0–50)
BASOPHILS # BLD AUTO: 0.01 K/UL
BASOPHILS NFR BLD AUTO: 0.2 %
BILIRUB SERPL-MCNC: 0.6 MG/DL (ref 0.1–1)
BUN SERPL-MCNC: 15 MG/DL (ref 8–23)
CALCIUM SERPL-MCNC: 9.2 MG/DL (ref 8.7–10.5)
CHLORIDE SERPL-SCNC: 103 MMOL/L (ref 95–110)
CO2 SERPL-SCNC: 27 MMOL/L (ref 23–29)
CREAT SERPL-MCNC: 0.8 MG/DL (ref 0.5–1.4)
ERYTHROCYTE [DISTWIDTH] IN BLOOD BY AUTOMATED COUNT: 13 % (ref 11.5–14.5)
GFR SERPLBLD CREATININE-BSD FMLA CKD-EPI: >60 ML/MIN/1.73/M2
GLUCOSE SERPL-MCNC: 98 MG/DL (ref 70–110)
HCT VFR BLD AUTO: 43.2 % (ref 40–54)
HGB BLD-MCNC: 14.6 GM/DL (ref 14–18)
IMM GRANULOCYTES # BLD AUTO: 0.01 K/UL (ref 0–0.04)
IMM GRANULOCYTES NFR BLD AUTO: 0.2 % (ref 0–0.5)
LYMPHOCYTES # BLD AUTO: 1.86 K/UL (ref 1–4.8)
MAGNESIUM SERPL-MCNC: 1.9 MG/DL (ref 1.6–2.6)
MCH RBC QN AUTO: 31.5 PG (ref 27–31)
MCHC RBC AUTO-ENTMCNC: 33.8 G/DL (ref 32–36)
MCV RBC AUTO: 93 FL (ref 82–98)
NUCLEATED RBC (/100WBC) (OHS): 0 /100 WBC
PHOSPHATE SERPL-MCNC: 4 MG/DL (ref 2.7–4.5)
PLATELET # BLD AUTO: 104 K/UL (ref 150–450)
PMV BLD AUTO: 11.7 FL (ref 9.2–12.9)
POTASSIUM SERPL-SCNC: 4.3 MMOL/L (ref 3.5–5.1)
PROT SERPL-MCNC: 6.6 GM/DL (ref 6–8.4)
RBC # BLD AUTO: 4.63 M/UL (ref 4.6–6.2)
RELATIVE EOSINOPHIL (OHS): 3.2 %
RELATIVE LYMPHOCYTE (OHS): 39.5 % (ref 18–48)
RELATIVE MONOCYTE (OHS): 7.9 % (ref 4–15)
RELATIVE NEUTROPHIL (OHS): 49 % (ref 38–73)
SODIUM SERPL-SCNC: 137 MMOL/L (ref 136–145)
VIT B12 SERPL-MCNC: 547 PG/ML (ref 210–950)
WBC # BLD AUTO: 4.71 K/UL (ref 3.9–12.7)

## 2025-08-04 PROCEDURE — 1160F RVW MEDS BY RX/DR IN RCRD: CPT | Mod: CPTII,S$GLB,, | Performed by: INTERNAL MEDICINE

## 2025-08-04 PROCEDURE — 99214 OFFICE O/P EST MOD 30 MIN: CPT | Mod: S$GLB,,, | Performed by: INTERNAL MEDICINE

## 2025-08-04 PROCEDURE — 84100 ASSAY OF PHOSPHORUS: CPT

## 2025-08-04 PROCEDURE — 36415 COLL VENOUS BLD VENIPUNCTURE: CPT

## 2025-08-04 PROCEDURE — 1101F PT FALLS ASSESS-DOCD LE1/YR: CPT | Mod: CPTII,S$GLB,, | Performed by: INTERNAL MEDICINE

## 2025-08-04 PROCEDURE — 85025 COMPLETE CBC W/AUTO DIFF WBC: CPT

## 2025-08-04 PROCEDURE — 82607 VITAMIN B-12: CPT

## 2025-08-04 PROCEDURE — 99999 PR PBB SHADOW E&M-EST. PATIENT-LVL III: CPT | Mod: PBBFAC,,, | Performed by: INTERNAL MEDICINE

## 2025-08-04 PROCEDURE — 84460 ALANINE AMINO (ALT) (SGPT): CPT

## 2025-08-04 PROCEDURE — 83735 ASSAY OF MAGNESIUM: CPT

## 2025-08-04 PROCEDURE — 1159F MED LIST DOCD IN RCRD: CPT | Mod: CPTII,S$GLB,, | Performed by: INTERNAL MEDICINE

## 2025-08-04 PROCEDURE — 3288F FALL RISK ASSESSMENT DOCD: CPT | Mod: CPTII,S$GLB,, | Performed by: INTERNAL MEDICINE

## 2025-08-04 PROCEDURE — 3075F SYST BP GE 130 - 139MM HG: CPT | Mod: CPTII,S$GLB,, | Performed by: INTERNAL MEDICINE

## 2025-08-04 PROCEDURE — 1126F AMNT PAIN NOTED NONE PRSNT: CPT | Mod: CPTII,S$GLB,, | Performed by: INTERNAL MEDICINE

## 2025-08-04 PROCEDURE — 3079F DIAST BP 80-89 MM HG: CPT | Mod: CPTII,S$GLB,, | Performed by: INTERNAL MEDICINE

## 2025-08-04 RX ORDER — CYANOCOBALAMIN 1000 UG/ML
1000 INJECTION, SOLUTION INTRAMUSCULAR; SUBCUTANEOUS
Qty: 30 ML | Refills: 3 | Status: SHIPPED | OUTPATIENT
Start: 2025-08-04 | End: 2025-08-04

## 2025-08-04 RX ORDER — CYANOCOBALAMIN 1000 UG/ML
1000 INJECTION, SOLUTION INTRAMUSCULAR; SUBCUTANEOUS
Qty: 30 ML | Refills: 3 | Status: SHIPPED | OUTPATIENT
Start: 2025-08-04

## 2025-08-05 ENCOUNTER — TELEPHONE (OUTPATIENT)
Dept: HEMATOLOGY/ONCOLOGY | Facility: CLINIC | Age: 80
End: 2025-08-05
Payer: MEDICARE

## 2025-08-05 NOTE — PROGRESS NOTES
Section of Hematology and Stem Cell Transplantation  Follow Up Visit     Date of visit: 8/4/25  Reason for visit: Thrombocytopenia [D69.6]    History of Present Ilness:   Mao Pearce (Jaime) is a pleasant 79 y.o.male who presents for initial consultation regarding thrombocytopenia. He reports he has had thrombocytopenia for the past 10-15 years with a platelet count in the 80s-90s. He has been asymptomatic. He denies recent fevers, chills, night sweats, unexpected weight loss. He has had multiple prior surgeries without significant bleeding. Denies easy bruising. No new medications. Denies recent infections.     Interval History:   He has tolerated the weekly B12 injections. No new symptoms. His B12 has normalized, and his platelets continue to increase. He remains very active.     PAST MEDICAL HISTORY:   Past Medical History:   Diagnosis Date    BPH (benign prostatic hyperplasia)     Hair loss     Hyperlipidemia     Osteoarthritis        PAST SURGICAL HISTORY:   Past Surgical History:   Procedure Laterality Date    HIP REPLACEMENT ARTHROPLASTY Left 5/7/2020    Procedure: ARTHROPLASTY, HIP REPLACEMENT;  Surgeon: Celestine Cox MD;  Location: [facility];  Service: Orthopedics;  Laterality: Left;    KNEE ARTHROSCOPY      TONSILLECTOMY      TOTAL KNEE ARTHROPLASTY Bilateral 7/25/2023    Procedure: ARTHROPLASTY, KNEE, TOTAL REPLACEMENT;  Surgeon: Celestine Cox MD;  Location: [facility];  Service: Orthopedics;  Laterality: Bilateral;  ADDUCTOR BLOCK       PAST SOCIAL HISTORY:  Social History[1]    FAMILY HISTORY:  Family History   Adopted: Yes   Problem Relation Name Age of Onset    No Known Problems Mother      No Known Problems Father         CURRENT MEDICATIONS:   Current Outpatient Medications   Medication Sig    acetaminophen (TYLENOL) 325 MG tablet Take 2 tablets (650 mg total) by mouth every 6 (six) hours as needed for Pain.    ALPRAZolam (XANAX) 1 MG tablet TK 1 T PO QHS PRN    finasteride (PROSCAR) 5 mg  tablet TK 1/2 T PO QD    methocarbamoL (ROBAXIN) 500 MG Tab Take 1 tablet (500 mg total) by mouth 2 (two) times daily as needed (muscle spasm).    pantoprazole (PROTONIX) 40 MG tablet     aspirin 81 MG Chew Take 1 tablet (81 mg total) by mouth 2 (two) times daily. for 16 days (Patient not taking: Reported on 8/4/2025)    atorvastatin (LIPITOR) 40 MG tablet Take 40 mg by mouth every evening. (Patient not taking: Reported on 8/4/2025)    azelastine (ASTELIN) 137 mcg (0.1 %) nasal spray 1 spray (137 mcg total) by Nasal route 2 (two) times daily as needed for Rhinitis. (Patient not taking: Reported on 8/4/2025)    benzonatate (TESSALON PERLES) 100 MG capsule Take 2 capsules (200 mg total) by mouth 3 (three) times daily as needed for Cough.    cetirizine (ZYRTEC) 10 MG tablet Take 1 tablet (10 mg total) by mouth daily as needed for Allergies or Rhinitis. (Patient not taking: Reported on 8/4/2025)    cyanocobalamin 1,000 mcg/mL injection Inject 1 mL (1,000 mcg total) into the muscle every 30 days.    tamsulosin (FLOMAX) 0.4 mg Cap Take 1 capsule (0.4 mg total) by mouth every evening. (Patient not taking: Reported on 8/4/2025)     No current facility-administered medications for this visit.       ALLERGIES:   Review of patient's allergies indicates:   Allergen Reactions    Ciprofloxacin Rash    Metronidazole Rash       Review of Systems:     Pertinent positives and negatives included in the HPI. Otherwise a complete review of systems is negative.    Physical Exam:     Vitals:    08/04/25 1534   BP: 138/84   Pulse: (!) 57   Temp: 98.1 °F (36.7 °C)     General: Appears well, NAD  HEENT: MMM, no OP lesions  Neck: Supple, no LAD  Pulmonary: CTAB, no increased work of breathing, no W/R/C  Cardiovascular: S1S2 normal, RRR, no M/R/G  Abdominal: Soft, NT, ND, BS+, no HSM  Extremities: No C/C/E  Neurological: AAOx4, grossly normal, no focal deficits  Dermatologic: No appreciable rashes or lesions    Labs:   Lab Results   Component  "Value Date    WBC 4.71 08/04/2025    HGB 14.6 08/04/2025    HCT 43.2 08/04/2025    MCV 93 08/04/2025     (L) 08/04/2025       Lab Results   Component Value Date     08/04/2025    K 4.3 08/04/2025     08/04/2025    CO2 27 08/04/2025    BUN 15 08/04/2025    CREATININE 0.8 08/04/2025    ALBUMIN 4.0 08/04/2025    BILITOT 0.6 08/04/2025    ALKPHOS 54 08/04/2025    AST 30 08/04/2025    ALT 25 08/04/2025       Lab Results   Component Value Date    IRON 135 03/10/2025    TIBC 373 03/10/2025    FERRITIN 238 03/10/2025       No components found for: "RETICULOCYTES"    Lab Results   Component Value Date    HAPTOGLOBIN 106 03/10/2025     03/10/2025       Imaging:   Reviewed       Assessment and Plan:   Mao Pearce (Jaime) is a pleasant 79 y.o.male with thrombocytopenia who presents for follow up.     Thrombocytopenia: He has had chronic thrombocytopenia without any other CBC abnormalities. He is asymptomatic. MPN workup negative. Labs previously notable for significant B12 deficiency, and since correcting B12 his platelet count has improved. Will continue B12 repletion and monitoring of platelet count.     Vitamin B12 deficiency: B12 now normal with parenteral repletion. Will check intrinsic Ab and parietal cell Ab to rule out pernicious anemia given long-standing history of B12 deficiency. Continue B12 monthly.    Follow up: Labs monthly x3 months. Follow up 3 months.    Orders/Follow Up:      Orders Placed This Encounter    Intrinsic Factor Blocking Ab    GASTRIC PARIETAL CELL ANTIBODY, ALY    cyanocobalamin 1,000 mcg/mL injection     Route Chart for Scheduling    BMT Chart Routing      Follow up with physician 3 months.   Follow up with [facility]    Provider visit type    Infusion scheduling note    Injection scheduling note    Labs CBC and CMP   Scheduling:  Preferred lab:  Lab interval: every 4 weeks  labs monthly (CBC, CMP). with next labs only, please include intrinsic antibody and parietal " cell antibody. With labs in 3 months (prior to visit), please include B12 with CBC/CMP   Imaging    Pharmacy appointment    Other referrals                         Total time of this visit was 35 minutes, including time spent face to face with patient and/or via video/audio, and also in preparing for today's visit for MDM and documentation. (Medical Decision Making, including consideration of possible diagnoses, management options, complex medical record review, review of diagnostic tests and information, consideration and discussion of significant complications based on comorbidities, and discussion with providers involved with the care of the patient). Greater than 50% was spent face to face with the patient counseling and coordinating care.    John Colbert MD  Malignant Hematology, Stem Cell Transplant, and Cellular Therapy  [facility]  [facility]           [1]   Social History  Tobacco Use    Smoking status: Former     Current packs/day: 0.00     Types: Cigarettes     Quit date: 1986     Years since quittin.6     Passive exposure: Past    Smokeless tobacco: Never   Substance Use Topics    Alcohol use: Yes     Comment: none 24 hr prior to surgery    Drug use: Not Currently

## 2025-09-02 ENCOUNTER — LAB VISIT (OUTPATIENT)
Dept: LAB | Facility: HOSPITAL | Age: 80
End: 2025-09-02
Attending: INTERNAL MEDICINE
Payer: MEDICARE

## 2025-09-02 DIAGNOSIS — D69.6 THROMBOCYTOPENIA: ICD-10-CM

## 2025-09-02 DIAGNOSIS — E53.8 B12 DEFICIENCY: ICD-10-CM

## 2025-09-02 LAB
ABSOLUTE EOSINOPHIL (OHS): 0.08 K/UL
ABSOLUTE MONOCYTE (OHS): 0.33 K/UL (ref 0.3–1)
ABSOLUTE NEUTROPHIL COUNT (OHS): 1.88 K/UL (ref 1.8–7.7)
ALBUMIN SERPL BCP-MCNC: 3.9 G/DL (ref 3.5–5.2)
ALP SERPL-CCNC: 62 UNIT/L (ref 40–150)
ALT SERPL W/O P-5'-P-CCNC: 17 UNIT/L (ref 0–55)
ANION GAP (OHS): 6 MMOL/L (ref 8–16)
AST SERPL-CCNC: 30 UNIT/L (ref 0–50)
BASOPHILS # BLD AUTO: 0.02 K/UL
BASOPHILS NFR BLD AUTO: 0.5 %
BILIRUB SERPL-MCNC: 0.7 MG/DL (ref 0.1–1)
BUN SERPL-MCNC: 12 MG/DL (ref 8–23)
CALCIUM SERPL-MCNC: 9.1 MG/DL (ref 8.7–10.5)
CHLORIDE SERPL-SCNC: 103 MMOL/L (ref 95–110)
CO2 SERPL-SCNC: 27 MMOL/L (ref 23–29)
CREAT SERPL-MCNC: 0.7 MG/DL (ref 0.5–1.4)
ERYTHROCYTE [DISTWIDTH] IN BLOOD BY AUTOMATED COUNT: 12.8 % (ref 11.5–14.5)
GFR SERPLBLD CREATININE-BSD FMLA CKD-EPI: >60 ML/MIN/1.73/M2
GLUCOSE SERPL-MCNC: 102 MG/DL (ref 70–110)
HCT VFR BLD AUTO: 43 % (ref 40–54)
HGB BLD-MCNC: 14.3 GM/DL (ref 14–18)
IMM GRANULOCYTES # BLD AUTO: 0.01 K/UL (ref 0–0.04)
IMM GRANULOCYTES NFR BLD AUTO: 0.3 % (ref 0–0.5)
LYMPHOCYTES # BLD AUTO: 1.59 K/UL (ref 1–4.8)
MCH RBC QN AUTO: 31.7 PG (ref 27–31)
MCHC RBC AUTO-ENTMCNC: 33.3 G/DL (ref 32–36)
MCV RBC AUTO: 95 FL (ref 82–98)
NUCLEATED RBC (/100WBC) (OHS): 0 /100 WBC
PLATELET # BLD AUTO: 84 K/UL (ref 150–450)
PMV BLD AUTO: 12.3 FL (ref 9.2–12.9)
POTASSIUM SERPL-SCNC: 4.1 MMOL/L (ref 3.5–5.1)
PROT SERPL-MCNC: 6.7 GM/DL (ref 6–8.4)
RBC # BLD AUTO: 4.51 M/UL (ref 4.6–6.2)
RELATIVE EOSINOPHIL (OHS): 2 %
RELATIVE LYMPHOCYTE (OHS): 40.7 % (ref 18–48)
RELATIVE MONOCYTE (OHS): 8.4 % (ref 4–15)
RELATIVE NEUTROPHIL (OHS): 48.1 % (ref 38–73)
SODIUM SERPL-SCNC: 136 MMOL/L (ref 136–145)
WBC # BLD AUTO: 3.91 K/UL (ref 3.9–12.7)

## 2025-09-02 PROCEDURE — 82247 BILIRUBIN TOTAL: CPT

## 2025-09-02 PROCEDURE — 86340 INTRINSIC FACTOR ANTIBODY: CPT

## 2025-09-02 PROCEDURE — 85025 COMPLETE CBC W/AUTO DIFF WBC: CPT

## 2025-09-02 PROCEDURE — 36415 COLL VENOUS BLD VENIPUNCTURE: CPT | Mod: PN

## 2025-09-02 PROCEDURE — 83516 IMMUNOASSAY NONANTIBODY: CPT

## 2025-09-03 LAB
ANNOTATION COMMENT IMP: NORMAL
IF BLOCK AB SER QL: NEGATIVE

## 2025-09-04 LAB — PCA IGG SER-ACNC: 24 UNITS

## (undated) DEVICE — BANDAGE ESMARK ELASTIC ST 6X9

## (undated) DEVICE — GLOVE BIOGEL SKINSENSE PI 8.5

## (undated) DEVICE — STOCKINETTE TUBULAR 2PL 6 X 4

## (undated) DEVICE — SUT VICRYL+ 1 CT1 18IN

## (undated) DEVICE — COVER HD BACK TABLE 6FT

## (undated) DEVICE — SOL IRR SOD CHL .9% POUR

## (undated) DEVICE — SPONGE COTTON TRAY 4X4IN

## (undated) DEVICE — Device

## (undated) DEVICE — TRAY CATH FOL SIL URIMTR 16FR

## (undated) DEVICE — SOL 9P NACL IRR PIC IL

## (undated) DEVICE — DRAPE INCISE IOBAN 2 23X17IN

## (undated) DEVICE — DRAPE STERI-DRAPE 83X125 IOBAN

## (undated) DEVICE — DRAPE SURG W/TWL 17 5/8X23

## (undated) DEVICE — TRAY FOLEY 16FR INFECTION CONT

## (undated) DEVICE — STAPLER SKIN ROTATING HEAD

## (undated) DEVICE — BANDAGE MATRIX HK LOOP 6IN 5YD

## (undated) DEVICE — DRESSING N ADH OIL EMUL 3X8

## (undated) DEVICE — KIT TOTAL HIP OPTIVAC

## (undated) DEVICE — GLOVE BIOGEL SKINSENSE PI 6.5

## (undated) DEVICE — SUT ETHIBOND EXCEL 1 CTX 18

## (undated) DEVICE — MASK FLYTE HOOD PEEL AWAY

## (undated) DEVICE — PAD ABD 8X10 STERILE

## (undated) DEVICE — BLADE SAW SAG 25.40MM X 1.27MM

## (undated) DEVICE — SEE MEDLINE ITEM 152530

## (undated) DEVICE — APPLICATOR CHLORAPREP ORN 26ML

## (undated) DEVICE — GOWN B1 X-LG X-LONG

## (undated) DEVICE — GLOVE BIOGEL SKINSENSE PI 7.0

## (undated) DEVICE — DRAPE C ARM 42 X 120 10/BX

## (undated) DEVICE — CLOSURE SKIN STERI STRIP 1/2X4

## (undated) DEVICE — DRAPE EXTREMITY ORTHOMAX

## (undated) DEVICE — TAPE SILK 3IN

## (undated) DEVICE — SYR 50CC LL

## (undated) DEVICE — ELECTRODE BLADE TEFLON 6

## (undated) DEVICE — NDL HYPO STD REG BVL 18GX1.5IN

## (undated) DEVICE — PAD CAST SPECIALIST STRL 6

## (undated) DEVICE — PILLOW ABDUCTION SM

## (undated) DEVICE — SEE MEDLINE ITEM 153151

## (undated) DEVICE — SUT VICRYL 2-0 8-18 CP-2

## (undated) DEVICE — POSITIONER IV ARMBOARD FOAM

## (undated) DEVICE — PULSAVAC ZIMMER

## (undated) DEVICE — UNDERGLOVE BIOGEL PI SZ 6.5 LF

## (undated) DEVICE — TAPE MEDIPORE 4IN X 2YDS

## (undated) DEVICE — PAD ABDOMINAL STERILE 8X10IN

## (undated) DEVICE — BLANKET MAXI-THERM PED 25X33IN

## (undated) DEVICE — BLADE SURG STAINLESS STEEL #15

## (undated) DEVICE — TAPE MEDIPORE 3 X 10YD

## (undated) DEVICE — GAUZE SPONGE 4X4 12PLY

## (undated) DEVICE — ELECTRODE REM PLYHSV RETURN 9

## (undated) DEVICE — TOURNIQUET SB QC DP 34X4IN

## (undated) DEVICE — COVER BACK TABLE 72X21

## (undated) DEVICE — GOWN ECLIPSE REINF LV4 XLNG XL

## (undated) DEVICE — YANKAUER OPEN TIP W/O VENT

## (undated) DEVICE — BLANKET HYPER ADULT 24X60IN

## (undated) DEVICE — STRIP STERI REIN CLSR 1/2X2IN

## (undated) DEVICE — SUT VICRYL+ 1 CTX 18IN VIOL

## (undated) DEVICE — DRAPE STERI U-SHAPED 47X51IN

## (undated) DEVICE — NDL 18GA X1 1/2 REG BEVEL

## (undated) DEVICE — DRESSING GAUZE OIL EMUL 3X8